# Patient Record
Sex: MALE | Race: WHITE | NOT HISPANIC OR LATINO | Employment: FULL TIME | URBAN - METROPOLITAN AREA
[De-identification: names, ages, dates, MRNs, and addresses within clinical notes are randomized per-mention and may not be internally consistent; named-entity substitution may affect disease eponyms.]

---

## 2017-01-16 ENCOUNTER — TELEPHONE (OUTPATIENT)
Dept: FAMILY MEDICINE | Facility: OTHER | Age: 52
End: 2017-01-16

## 2017-01-16 NOTE — TELEPHONE ENCOUNTER
Informed pharmacy. They will change the Rx. To proair and notify patient when it is ready for .    Jovita Caceres MA

## 2017-01-16 NOTE — TELEPHONE ENCOUNTER
Reason for call:  Medication  Reason for Call:  Medication or medication refill:    Do you use a [facility]?  Name of the pharmacy and phone number for the current request:  [facility] - [phone]    Name of the medication requested: ventolin    Other request: insurance saying they wont cover this and that he needs to change to pro air. Is this ok?     Can we leave a detailed message on this number? YES    Phone number patient can be reached at: [phone]    Best Time: any    Call taken on 1/16/2017 at 9:43 AM by Gisele Brown

## 2017-01-16 NOTE — TELEPHONE ENCOUNTER
Yes, Ok  Proair is another brand of the albuterol . Please call pharmacy , ok to dispense brand covered by [employer]

## 2017-01-30 ENCOUNTER — TELEPHONE (OUTPATIENT)
Dept: FAMILY MEDICINE | Facility: OTHER | Age: 52
End: 2017-01-30

## 2017-01-30 DIAGNOSIS — J01.90 ACUTE SINUSITIS WITH SYMPTOMS > 10 DAYS: Primary | ICD-10-CM

## 2017-01-30 RX ORDER — AMOXICILLIN AND CLAVULANATE POTASSIUM 500; 125 MG/1; MG/1
1 TABLET, FILM COATED ORAL 3 TIMES DAILY
Qty: 21 TABLET | Refills: 0 | Status: ON HOLD | OUTPATIENT
Start: 2017-01-30 | End: 2017-02-07

## 2017-01-30 NOTE — TELEPHONE ENCOUNTER
RN Sinusitis Treatment Protocol: ages 18 and up  Parker Hunt, a 51 year old male, is having symptoms reviewed for possible sinus infection.  Amoxicillin-clavulanate 875mg/125mg orally twice daily or 500mg/125mg three times daily for 5 to 7 days.ASSESSMENT/PLAN:  1.  Antibiotic treatment is indicated as onset of symptoms have been more than 10 days.  Amoxicillin-clavulanate 875mg/125mg orally twice daily or 500mg/125mg three times daily for 5 to 7 days.  2.  Education: ibuprofen or acetaminophen as directed on the bottle, over the counter decongestant, Nasal saline rinse, (Neti-pot or a nasal saline spray is preferred to Afrin nasal spray), Afrin nasal spray no longer than 3 days.  3.  Follow-up: Contact provider's triage RN if symptoms are not improving after 10 days of home treatment or if symptoms are worsening.   4.  Patient verbalized understanding of this plan and is agreeable.    SUBJECTIVE:  Symptoms: Facial pain or pressure over the sinus areas, especially if worse with position change or cough, Nasal discharge/purulent, Nasal congestion, Change in sense of smell or lack of smell and Post nasal drip     In addition notes: None   Shortness of breath: NO  Onset of symptoms was 3 week(s) ago.    Treatment measures tried include Fluids, OTC meds and Rest.   Course of illness is worsening.  Predisposing conditions include: History of asthma    Complicating Factors and Serious Symptoms:   Patient reports: NONE   Patient denies: Fever > 102.5  Orbital pain  Periorbital swelling or erythema  Severe facial swelling or erythema  Severe neck pain  This being the worse headache the patient has ever experienced  Vision changes  COPD (chronic obstructive pulmonary disease)    ALLERGIES:   Allergies   Allergen Reactions     No Known Drug Allergies        OBJECTIVE:    Weight: 0 lbs 0 oz    NURSING PLAN: Nursing advice to patient wash hands well, cover cough, follow up in clinic if not improving    RECOMMENDED  DISPOSITION:  Home care advice -   Will comply with recommendation:  Yes    Encounter handled by: Nurse Triage.     Jimbo Erazo RN

## 2017-02-01 ENCOUNTER — APPOINTMENT (OUTPATIENT)
Dept: GENERAL RADIOLOGY | Facility: CLINIC | Age: 52
DRG: 176 | End: 2017-02-01
Attending: FAMILY MEDICINE
Payer: COMMERCIAL

## 2017-02-01 ENCOUNTER — TELEPHONE (OUTPATIENT)
Dept: FAMILY MEDICINE | Facility: OTHER | Age: 52
End: 2017-02-01

## 2017-02-01 ENCOUNTER — APPOINTMENT (OUTPATIENT)
Dept: ULTRASOUND IMAGING | Facility: CLINIC | Age: 52
DRG: 176 | End: 2017-02-01
Attending: FAMILY MEDICINE
Payer: COMMERCIAL

## 2017-02-01 ENCOUNTER — HOSPITAL ENCOUNTER (INPATIENT)
Facility: CLINIC | Age: 52
LOS: 5 days | Discharge: HOME HEALTH | DRG: 176 | End: 2017-02-07
Attending: FAMILY MEDICINE | Admitting: PEDIATRICS
Payer: COMMERCIAL

## 2017-02-01 ENCOUNTER — APPOINTMENT (OUTPATIENT)
Dept: CT IMAGING | Facility: CLINIC | Age: 52
DRG: 176 | End: 2017-02-01
Attending: FAMILY MEDICINE
Payer: COMMERCIAL

## 2017-02-01 DIAGNOSIS — I10 ESSENTIAL HYPERTENSION: ICD-10-CM

## 2017-02-01 DIAGNOSIS — I26.99 OTHER ACUTE PULMONARY EMBOLISM WITHOUT ACUTE COR PULMONALE (H): ICD-10-CM

## 2017-02-01 DIAGNOSIS — R09.02 HYPOXIA: ICD-10-CM

## 2017-02-01 DIAGNOSIS — J01.90 ACUTE NON-RECURRENT SINUSITIS, UNSPECIFIED LOCATION: ICD-10-CM

## 2017-02-01 DIAGNOSIS — J45.41 MODERATE PERSISTENT ASTHMA WITH ACUTE EXACERBATION: Primary | ICD-10-CM

## 2017-02-01 LAB
ANION GAP SERPL CALCULATED.3IONS-SCNC: 8 MMOL/L (ref 3–14)
BASE EXCESS BLDV CALC-SCNC: 1.1 MMOL/L
BASOPHILS # BLD AUTO: 0 10E9/L (ref 0–0.2)
BASOPHILS NFR BLD AUTO: 0.5 %
BUN SERPL-MCNC: 14 MG/DL (ref 7–30)
CALCIUM SERPL-MCNC: 8.9 MG/DL (ref 8.5–10.1)
CHLORIDE SERPL-SCNC: 107 MMOL/L (ref 94–109)
CO2 SERPL-SCNC: 25 MMOL/L (ref 20–32)
CREAT SERPL-MCNC: 1.06 MG/DL (ref 0.66–1.25)
D DIMER PPP FEU-MCNC: 0.3 UG/ML FEU (ref 0–0.5)
DIFFERENTIAL METHOD BLD: NORMAL
EOSINOPHIL # BLD AUTO: 0.6 10E9/L (ref 0–0.7)
EOSINOPHIL NFR BLD AUTO: 10.7 %
ERYTHROCYTE [DISTWIDTH] IN BLOOD BY AUTOMATED COUNT: 13.1 % (ref 10–15)
FLUAV+FLUBV AG SPEC QL: NEGATIVE
FLUAV+FLUBV AG SPEC QL: NORMAL
GFR SERPL CREATININE-BSD FRML MDRD: 73 ML/MIN/1.7M2
GLUCOSE SERPL-MCNC: 99 MG/DL (ref 70–99)
HCO3 BLDV-SCNC: 25 MMOL/L (ref 21–28)
HCT VFR BLD AUTO: 46.1 % (ref 40–53)
HGB BLD-MCNC: 15.8 G/DL (ref 13.3–17.7)
IMM GRANULOCYTES # BLD: 0.1 10E9/L (ref 0–0.4)
IMM GRANULOCYTES NFR BLD: 0.9 %
INR PPP: 0.99 (ref 0.86–1.14)
LYMPHOCYTES # BLD AUTO: 1.2 10E9/L (ref 0.8–5.3)
LYMPHOCYTES NFR BLD AUTO: 20.5 %
MCH RBC QN AUTO: 30.5 PG (ref 26.5–33)
MCHC RBC AUTO-ENTMCNC: 34.3 G/DL (ref 31.5–36.5)
MCV RBC AUTO: 89 FL (ref 78–100)
MONOCYTES # BLD AUTO: 0.3 10E9/L (ref 0–1.3)
MONOCYTES NFR BLD AUTO: 5.7 %
NEUTROPHILS # BLD AUTO: 3.6 10E9/L (ref 1.6–8.3)
NEUTROPHILS NFR BLD AUTO: 61.7 %
NT-PROBNP SERPL-MCNC: 41 PG/ML (ref 0–900)
O2/TOTAL GAS SETTING VFR VENT: 21 %
PCO2 BLDV: 38 MM HG (ref 40–50)
PH BLDV: 7.43 PH (ref 7.32–7.43)
PLATELET # BLD AUTO: 235 10E9/L (ref 150–450)
PO2 BLDV: 50 MM HG (ref 25–47)
POTASSIUM SERPL-SCNC: 4 MMOL/L (ref 3.4–5.3)
RBC # BLD AUTO: 5.18 10E12/L (ref 4.4–5.9)
SODIUM SERPL-SCNC: 140 MMOL/L (ref 133–144)
SPECIMEN SOURCE: NORMAL
TROPONIN I SERPL-MCNC: NORMAL UG/L (ref 0–0.04)
WBC # BLD AUTO: 5.8 10E9/L (ref 4–11)

## 2017-02-01 PROCEDURE — 96360 HYDRATION IV INFUSION INIT: CPT

## 2017-02-01 PROCEDURE — 71260 CT THORAX DX C+: CPT

## 2017-02-01 PROCEDURE — 84484 ASSAY OF TROPONIN QUANT: CPT | Performed by: FAMILY MEDICINE

## 2017-02-01 PROCEDURE — 25000125 ZZHC RX 250: Performed by: FAMILY MEDICINE

## 2017-02-01 PROCEDURE — 93970 EXTREMITY STUDY: CPT

## 2017-02-01 PROCEDURE — 80048 BASIC METABOLIC PNL TOTAL CA: CPT | Performed by: FAMILY MEDICINE

## 2017-02-01 PROCEDURE — 96361 HYDRATE IV INFUSION ADD-ON: CPT

## 2017-02-01 PROCEDURE — 25000132 ZZH RX MED GY IP 250 OP 250 PS 637: Performed by: FAMILY MEDICINE

## 2017-02-01 PROCEDURE — 82803 BLOOD GASES ANY COMBINATION: CPT | Performed by: FAMILY MEDICINE

## 2017-02-01 PROCEDURE — 71010 XR CHEST PORT 1 VW: CPT | Mod: TC

## 2017-02-01 PROCEDURE — 87804 INFLUENZA ASSAY W/OPTIC: CPT | Performed by: FAMILY MEDICINE

## 2017-02-01 PROCEDURE — 99285 EMERGENCY DEPT VISIT HI MDM: CPT | Mod: 25 | Performed by: FAMILY MEDICINE

## 2017-02-01 PROCEDURE — 83880 ASSAY OF NATRIURETIC PEPTIDE: CPT | Performed by: FAMILY MEDICINE

## 2017-02-01 PROCEDURE — G0378 HOSPITAL OBSERVATION PER HR: HCPCS

## 2017-02-01 PROCEDURE — 85379 FIBRIN DEGRADATION QUANT: CPT | Performed by: FAMILY MEDICINE

## 2017-02-01 PROCEDURE — 96372 THER/PROPH/DIAG INJ SC/IM: CPT

## 2017-02-01 PROCEDURE — 25500064 ZZH RX 255 OP 636: Performed by: FAMILY MEDICINE

## 2017-02-01 PROCEDURE — 99285 EMERGENCY DEPT VISIT HI MDM: CPT | Mod: 25

## 2017-02-01 PROCEDURE — 85610 PROTHROMBIN TIME: CPT | Performed by: FAMILY MEDICINE

## 2017-02-01 PROCEDURE — 25000128 H RX IP 250 OP 636: Performed by: FAMILY MEDICINE

## 2017-02-01 PROCEDURE — 85025 COMPLETE CBC W/AUTO DIFF WBC: CPT | Performed by: FAMILY MEDICINE

## 2017-02-01 PROCEDURE — 93010 ELECTROCARDIOGRAM REPORT: CPT | Performed by: FAMILY MEDICINE

## 2017-02-01 PROCEDURE — 25000132 ZZH RX MED GY IP 250 OP 250 PS 637: Performed by: PEDIATRICS

## 2017-02-01 PROCEDURE — 99220 ZZC INITIAL OBSERVATION CARE,LEVL III: CPT | Performed by: PHYSICIAN ASSISTANT

## 2017-02-01 RX ORDER — LORATADINE 10 MG/1
10 TABLET ORAL DAILY
COMMUNITY
End: 2017-03-27

## 2017-02-01 RX ORDER — GUAIFENESIN 600 MG/1
1200 TABLET, EXTENDED RELEASE ORAL 2 TIMES DAILY
COMMUNITY
End: 2017-03-27

## 2017-02-01 RX ORDER — ALBUTEROL SULFATE 0.83 MG/ML
2.5 SOLUTION RESPIRATORY (INHALATION)
Status: DISCONTINUED | OUTPATIENT
Start: 2017-02-01 | End: 2017-02-01

## 2017-02-01 RX ORDER — ALBUTEROL SULFATE 0.83 MG/ML
2.5 SOLUTION RESPIRATORY (INHALATION) EVERY 4 HOURS PRN
Status: DISCONTINUED | OUTPATIENT
Start: 2017-02-01 | End: 2017-02-02

## 2017-02-01 RX ORDER — IOPAMIDOL 755 MG/ML
100 INJECTION, SOLUTION INTRAVASCULAR ONCE
Status: COMPLETED | OUTPATIENT
Start: 2017-02-01 | End: 2017-02-01

## 2017-02-01 RX ORDER — LIDOCAINE 40 MG/G
CREAM TOPICAL
Status: DISCONTINUED | OUTPATIENT
Start: 2017-02-01 | End: 2017-02-07 | Stop reason: HOSPADM

## 2017-02-01 RX ORDER — LISINOPRIL 2.5 MG/1
5 TABLET ORAL DAILY
Status: DISCONTINUED | OUTPATIENT
Start: 2017-02-01 | End: 2017-02-04

## 2017-02-01 RX ORDER — NALOXONE HYDROCHLORIDE 0.4 MG/ML
.1-.4 INJECTION, SOLUTION INTRAMUSCULAR; INTRAVENOUS; SUBCUTANEOUS
Status: DISCONTINUED | OUTPATIENT
Start: 2017-02-01 | End: 2017-02-01

## 2017-02-01 RX ORDER — AMOXICILLIN AND CLAVULANATE POTASSIUM 500; 125 MG/1; MG/1
1 TABLET, FILM COATED ORAL EVERY 8 HOURS SCHEDULED
Status: DISCONTINUED | OUTPATIENT
Start: 2017-02-01 | End: 2017-02-07 | Stop reason: HOSPADM

## 2017-02-01 RX ORDER — ASPIRIN 81 MG/1
324 TABLET, CHEWABLE ORAL ONCE
Status: COMPLETED | OUTPATIENT
Start: 2017-02-01 | End: 2017-02-01

## 2017-02-01 RX ORDER — IPRATROPIUM BROMIDE AND ALBUTEROL SULFATE 2.5; .5 MG/3ML; MG/3ML
3 SOLUTION RESPIRATORY (INHALATION) ONCE
Status: COMPLETED | OUTPATIENT
Start: 2017-02-01 | End: 2017-02-01

## 2017-02-01 RX ORDER — LIDOCAINE 40 MG/G
CREAM TOPICAL
Status: DISCONTINUED | OUTPATIENT
Start: 2017-02-01 | End: 2017-02-01

## 2017-02-01 RX ORDER — DEXAMETHASONE SODIUM PHOSPHATE 10 MG/ML
10 INJECTION, SOLUTION INTRAMUSCULAR; INTRAVENOUS ONCE
Status: DISCONTINUED | OUTPATIENT
Start: 2017-02-01 | End: 2017-02-01

## 2017-02-01 RX ADMIN — IOPAMIDOL 77 ML: 755 INJECTION, SOLUTION INTRAVENOUS at 11:40

## 2017-02-01 RX ADMIN — LISINOPRIL 5 MG: 2.5 TABLET ORAL at 21:03

## 2017-02-01 RX ADMIN — WARFARIN SODIUM 7.5 MG: 5 TABLET ORAL at 18:55

## 2017-02-01 RX ADMIN — IPRATROPIUM BROMIDE AND ALBUTEROL SULFATE 3 ML: .5; 3 SOLUTION RESPIRATORY (INHALATION) at 10:20

## 2017-02-01 RX ADMIN — SODIUM CHLORIDE 1000 ML: 9 INJECTION, SOLUTION INTRAVENOUS at 10:36

## 2017-02-01 RX ADMIN — ENOXAPARIN SODIUM 120 MG: 120 INJECTION SUBCUTANEOUS at 13:06

## 2017-02-01 RX ADMIN — ASPIRIN 81 MG CHEWABLE TABLET 324 MG: 81 TABLET CHEWABLE at 09:19

## 2017-02-01 RX ADMIN — AMOXICILLIN AND CLAVULANATE POTASSIUM 1 TABLET: 500; 125 TABLET, FILM COATED ORAL at 21:04

## 2017-02-01 RX ADMIN — SODIUM CHLORIDE 71 ML: 9 INJECTION, SOLUTION INTRAVENOUS at 11:40

## 2017-02-01 ASSESSMENT — ENCOUNTER SYMPTOMS
FEVER: 0
SHORTNESS OF BREATH: 1
CHILLS: 0
ACTIVITY CHANGE: 1
DIFFICULTY URINATING: 0
HEADACHES: 0
DIAPHORESIS: 0
CHOKING: 0
ARTHRALGIAS: 0
WHEEZING: 0
SORE THROAT: 0
APNEA: 0
DIZZINESS: 0
DIARRHEA: 0
ABDOMINAL DISTENTION: 0
NAUSEA: 0
VOMITING: 0
ABDOMINAL PAIN: 0
PALPITATIONS: 0
COUGH: 0
MYALGIAS: 0
CHEST TIGHTNESS: 0
TROUBLE SWALLOWING: 0

## 2017-02-01 NOTE — PROGRESS NOTES
S-(situation): Patient registered to Observation. Patient arrived to room 269 via cart from ED.    B-(background): PE    A-(assessment): SOB with activity, sats 95% on 5L.    R-(recommendations): Orders and observation goals reviewed with patient and wife.    Nursing Observation criteria listed below was met:    Skin issues/needs documented:NA  Isolation needs addressed, if appropriate: NA  Fall Prevention: Education given and documented and signage used: Yes  Education Assessment documented:Yes  Education Documented (Pre-existing chronic infection such as, MRSA/VRE need education on admission): Yes  New medication patient education completed and documented (Possible Side Effects of Common Medications handout): Yes  Home medications if not able to send immediately home with family stored here: Yes  Reminder note placed in discharge instructions: Yes  Patient has discharge needs (If yes, please explain): No

## 2017-02-01 NOTE — TELEPHONE ENCOUNTER
Parker Hunt is a 51 year old male who calls with report of difficulty breathing.  Patient is currently on ABX for sinus infection.  Started ABX 2 days ago.  Feels medication is helping symptoms but over the last few days has been getting progressively worse.  Not talking in completed sentences.  Hx of asthma.  Was using Pro Air x 10 per day.  He has not taken that in a few days and has only been taking his flovent.  No chest pain.  It is worsening over the past couple of days.  Patient has been taking mucinex.  It doesn't seem to be helping.     NURSING ASSESSMENT:  Description:  Difficulty breathing  Onset/duration:  2 days  Precip. factors:  Hx of asthma  Associated symptoms:  See above  Last exam/Treatment:  11/28/2016  Allergies:   Allergies   Allergen Reactions     No Known Drug Allergies        NURSING PLAN: Recommend patient be seen in ED for report of difficulty breathing, despite using inhalers and unable to speak in complete sentences.  Patient agrees to be seen in ED.    RECOMMENDED DISPOSITION:  To ED  Will comply with recommendation: Yes  If further questions/concerns or if symptoms do not improve, worsen or new symptoms develop, call your PCP or [facility] as soon as possible.      Guideline used: breathing problems  Telephone Triage Protocols for Nurses, Fourth Edition, Stacie Segal RN

## 2017-02-01 NOTE — ED NOTES
Pt continues to be short of breath while moving or eating. Pt applying 02 oximizer intermittently. Pt has had nasal drainage and stuffiness.

## 2017-02-01 NOTE — IP AVS SNAPSHOT
MRN:[id number]                      After Visit Summary   2/1/2017    Parker Hunt    MRN: [id number]           Thank you!     Thank you for choosing [facility] for your care. Our goal is always to provide you with excellent care. Hearing back from our patients is one way we can continue to improve our services. Please take a few minutes to complete the written survey that you may receive in the mail after you visit with us. Thank you!        Patient Information     Date Of Birth          1965        About your hospital stay     You were admitted on:  February 1, 2017 You last received care in the:  [address]    You were discharged on:  February 7, 2017        Reason for your hospital stay       1.  Pulmonary embolism (clot in your lungs)  2.  Asthma attack  3.  Sinus infection    You have improved and stabilized while you are in the hospital and are able to go home with ongoing oxygen, coumadin, steroids, and nebs.  Please continue to use 1-2 L oxygen when at rest, 2L with activity and 3L while sleeping.  Enjoy going home!!                  Who to Call     For medical emergencies, please call 911.  For non-urgent questions about your medical care, please call your primary care provider or clinic, [phone]          Attending Provider     Provider    Rio Caceres MD Katter, James T, MD Peterson, Elizabeth Joanne, MD       Primary Care Provider Office Phone # Fax #    Monica Odell [phone]       [facility] [address] [facility] [address]    [facility] [address]        After Care Instructions     Activity       Your activity upon discharge: activity as tolerated            Diet       Follow this diet upon discharge: Regular Diet Adult                  Follow-up Appointments     Follow-up and recommended labs and tests        Follow up with primary care provider, Monica Odell, within 7 days for hospital follow- up.   You should have a pulmonary function test performed in the future once you are at back to your baseline breathing to assess your asthma further and see if there has been any longer term damage done by this event.                  Your next 10 appointments already scheduled     Feb 09, 2017  9:00 AM   LAB with [facility]   [facility]    [address] [address]   [phone]           Patient must bring picture ID.  Patient should be prepared to give a urine specimen  Please do not eat 10-12 hours before your appointment if you are coming in fasting for labs on lipids, cholesterol, or glucose (sugar).  Pregnant women should follow their Care Team instructions. Water with medications is okay. Do not drink coffee or other fluids.   If you have concerns about taking  your medications, please ask at office or if scheduling via F[employer], send a message by clicking on Secure Messaging, Message Your Care Team.            Feb 13, 2017  1:40 PM   Office Visit with Monica Odell MD   [facility]    [address] [address]   [phone]           Bring a current list of meds and any records pertaining to this visit.  For Physicals, please bring immunization records and any forms needing to be filled out.  Please arrive 10 minutes early to complete paperwork.              Additional Services     HOME CARE NURSING REFERRAL       **Order classes of: [facility],  Homecare and [facility] will route to the Home Care and Hospice Referral Pool.  Home Care or Hospice will then contact the patient to schedule their appointment.**    If you do not hear from Home Care and Hospice, or you would like to call to schedule, please call the referring place of service that your provider has listed below.  ______________________________________________________________________    Your provider has referred you to:  [facility]: [facility] [phone]   [email or web address]    Extended Emergency Contact Information  Primary Emergency Contact: Manuela Hunt  Address: [address] [place] [address] [facility]  Home Phone: [phone]  Relation: Spouse  Secondary Emergency Contact: no one   [place]  Relation: None    Patient Anticipated Discharge Date: 2/7/17   RN, PT, HHA to begin 24 - 48 hours after discharge.  PLEASE EVALUATE AND TREAT (Evaluation timeline is 24 - 48 hrs. Please call if there is need for a variance to this timeline).    REASON FOR REFERRAL: Assessment & Treatment: RN    ADDITIONAL SERVICES NEEDED: non identified    OTHER PERTINENT INFORMATION: Patient was last seen by provider on 2/7/17 for pulmonary embolism, asthma exacerbation.    No current outpatient prescriptions on file.    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     Atypical chest pain     Positive D dimer     Thyroid nodules     Hyperlipidemia LDL goal <160     Hypertension     History of thyroid cancer     Moderate intermittent asthma     Acute pulmonary embolism (H)     Hypoxia     Moderate persistent asthma with acute exacerbation     Acute respiratory distress (H)     Acute sinusitis     Obesity      Documentation of Face to Face and Certification for Home Health Services    I certify that patient, Parker Hunt is under my care and that I, or a Nurse Practitioner or Physician's Assistant working with me, had a face-to-face encounter that meets the physician face-to-face encounter requirements with this patient on: 2/7/2017.    This encounter with the patient was in whole, or in part, for the following medical condition, which is the primary reason for Home Health Care: respiratory failure secondary to pulmonary embolism and asthma exacerbation.    I  certify that, based on my findings, the following services are medically necessary Home Health Services: Nursing    My clinical findings support the need for the above services because: Nurse is needed: To assess medical stability and medication understanding after changes in medications or other medical regimen..    Further, I certify that my clinical findings support that this patient is homebound (i.e. absences from home require considerable and taxing effort and are for medical reasons or [employer] services or infrequently or of short duration when for other reasons) because: Requires assistance of another person or specialized equipment to access medical services because patient: Is unable to walk greater than 50 feet without rest..    Based on the above findings, I certify that this patient is confined to the home and needs intermittent skilled nursing care, physical therapy and/or speech therapy.  The patient is under my care, and I have initiated the establishment of the plan of care.  This patient will be followed by a physician who will periodically review the plan of care.    Physician/Provider to provide follow up care: Monica Odell certified Physician at time of discharge: Vivian Cohn MD    Please be aware that coverage of these services is subject to the terms and limitations of your health insurance plan.  Call member services at your health plan with any benefit or coverage questions.            INR CLINIC REFERRAL       Your provider has referred you to INR Services.    Please be aware that coverage of these services is subject to the terms and limitations of your health insurance plan.  Call member services at your health plan with any benefit or coverage questions.    Indication for Anticoagulation: Pulmonary Embolism  If nonstandard INR is desired, indicate goal range and explanation: N/A  Expected Duration of Therapy: 6 Months                  Warfarin  "Instruction     You have started taking a medicine called warfarin. This is a blood-thinning medicine (anticoagulant). It helps prevent and treat blood clots.      Before leaving the hospital, make sure you know how much to take and how long to take it.      You will need regular blood tests to make sure your blood is clotting safely. It is very important to see your doctor for regular blood tests.    Talk to your doctor before taking any new medicine (this includes over-the-counter drugs and herbal products). Many medicines can interact with warfarin. This may cause more bleeding or too much clotting.     Eating a lot of vitamin K--found in green, leafy vegetables--can change the way warfarin works in your body. Do NOT avoid these foods. Instead, try to eat the same amount each day.     Bleeding is the most common side-effect of warfarin. You may notice bleeding gums, a bloody nose, bruises and bleeding longer when you cut yourself. See a doctor at once if:   o You cough up blood  o You find blood in your stool (poop)  o You have a deep cut, or a cut that bleeds longer than 10 minutes   o You have a bad cut, hard fall, accident or hit your head (go to urgent care or the emergency room).    For women who can get pregnant: This medicine can harm an unborn baby. Be very careful not to get pregnant while taking this medicine. If you think you might be pregnant, call your doctor right away.    For more information, read \"Guide to Warfarin Therapy,  the booklet you received in the hospital.        Pending Results     No orders found from 1/31/2017 to 2/2/2017.            Statement of Approval     Ordered          02/07/17 1301  I have reviewed and agree with all the recommendations and orders detailed in this document.   EFFECTIVE NOW     Approved and electronically signed by:  Vivian Cohn MD             Admission Information        Provider Department Dept Phone    2/1/2017 Vivian Cohn MD " "[facility] [phone]      Your Vitals Were     Blood Pressure Pulse Temperature    145/83 mmHg 108 97.5  F (36.4  C) (Oral)    Respirations Height Weight    20 1.93 m (6' 4\") 133.5 kg (294 lb 5 oz)    BMI (Body Mass Index) Pulse Oximetry       35.84 kg/m2 92%       MyChart Information     [employer] [employer] lets you send messages to your doctor, view your test results, renew your prescriptions, schedule appointments and more. To sign up, go to [email or web address] . Click on \"Log in\" on the left side of the screen, which will take you to the Welcome page. Then click on \"Sign up Now\" on the right side of the page.     You will be asked to enter the access code listed below, as well as some personal information. Please follow the directions to create your username and password.     Your access code is: [email or web address]  Expires: 2017  9:01 PM     Your access code will  in 90 days. If you need help or a new code, please call your [facility] clinic or [phone].        Care EveryWhere ID     This is your Care EveryWhere ID. This could be used by other organizations to access your [facility] medical records  [id number]           Review of your medicines      START taking        Dose / Directions    ipratropium - albuterol 0.5 mg/2.5 mg/3 mL 0.5-2.5 (3) MG/3ML neb solution   Commonly known as:  DUONEB   Used for:  Other acute pulmonary embolism without acute cor pulmonale (H), Moderate persistent asthma with acute exacerbation        Dose:  3 mL   Take 1 vial (3 mLs) by nebulization every 4 hours as needed for wheezing   Quantity:  360 mL   Refills:  0       lisinopril 10 MG tablet   Commonly known as:  PRINIVIL/ZESTRIL   Used for:  Essential hypertension        Dose:  10 mg   Take 1 tablet (10 mg) by mouth daily   Quantity:  30 tablet   Refills:  0       * order for DME   Used for:  Moderate persistent asthma with acute exacerbation, Other acute pulmonary embolism without acute cor pulmonale (H)        " Equipment being ordered: Nebulizer with attachment kit   Quantity:  1 Device   Refills:  0       * order for DME   Used for:  Moderate persistent asthma with acute exacerbation, Other acute pulmonary embolism without acute cor pulmonale (H)        Equipment being ordered: Oxygen Patient requires 2-3 L oxygen continuously.  Requires portable gas.   Quantity:  1 Device   Refills:  0       predniSONE 20 MG tablet   Commonly known as:  DELTASONE   Used for:  Moderate persistent asthma with acute exacerbation        Dose:  40 mg   Take 2 tablets (40 mg) by mouth 2 times daily x3 days, then 2 tabs in AM 1 tab in PM x3 days, then 2 tabs in AM x3 days, then 1 tab in AM x3 days, then stop.   Quantity:  30 tablet   Refills:  0       sodium chloride 0.65 % nasal spray   Commonly known as:  OCEAN        Dose:  1 spray   Spray 1 spray into both nostrils every hour as needed for congestion   Refills:  0       warfarin 4 MG tablet   Commonly known as:  COUMADIN   Used for:  Other acute pulmonary embolism without acute cor pulmonale (H)        Dose:  4 mg   Take 1 tablet (4 mg) by mouth daily At 6 PM - may change as directed by Coumadin Clinic   Quantity:  30 tablet   Refills:  0       * Notice:  This list has 2 medication(s) that are the same as other medications prescribed for you. Read the directions carefully, and ask your doctor or other care provider to review them with you.      CONTINUE these medicines which may have CHANGED, or have new prescriptions. If we are uncertain of the size of tablets/capsules you have at home, strength may be listed as something that might have changed.        Dose / Directions    * albuterol 108 (90 BASE) MCG/ACT Inhaler   Commonly known as:  VENTOLIN HFA   This may have changed:  Another medication with the same name was added. Make sure you understand how and when to take each.   Used for:  Wheezing        USE 2 PUFFS EVERY SIX HOURS AS NEEDED FOR SHORTNESS OF BREATH AND DIFFICULTY BREATHING    Quantity:  18 g   Refills:  3       * albuterol (2.5 MG/3ML) 0.083% neb solution   This may have changed:  You were already taking a medication with the same name, and this prescription was added. Make sure you understand how and when to take each.   Used for:  Moderate persistent asthma with acute exacerbation, Other acute pulmonary embolism without acute cor pulmonale (H)        Dose:  3 mL   Take 1 vial (2.5 mg) by nebulization every 4 hours (while awake)   Quantity:  360 mL   Refills:  0       amoxicillin-clavulanate 500-125 MG per tablet   Commonly known as:  AUGMENTIN   Indication:  sinusitis   This may have changed:    - when to take this  - additional instructions   Used for:  Acute non-recurrent sinusitis, unspecified location        Dose:  1 tablet   Take 1 tablet by mouth every 8 hours for 11 doses - first dose the evening of discharge   Quantity:  11 tablet   Refills:  0       * Notice:  This list has 2 medication(s) that are the same as other medications prescribed for you. Read the directions carefully, and ask your doctor or other care provider to review them with you.      CONTINUE these medicines which have NOT CHANGED        Dose / Directions    diphenhydrAMINE-acetaminophen  MG tablet   Commonly known as:  TYLENOL PM        Dose:  1 tablet   Take 1 tablet by mouth nightly as needed for sleep   Refills:  0       fluticasone 110 MCG/ACT Inhaler   Commonly known as:  FLOVENT HFA   Used for:  Moderate intermittent asthma, uncomplicated        Dose:  2 puff   Inhale 2 puffs into the lungs 2 times daily Needs to be seen for follow up.   Quantity:  1 Inhaler   Refills:  5       guaiFENesin 600 MG 12 hr tablet   Commonly known as:  MUCINEX        Dose:  1200 mg   Take 1,200 mg by mouth 2 times daily   Refills:  0       loratadine 10 MG tablet   Commonly known as:  CLARITIN        Dose:  10 mg   Take 10 mg by mouth daily   Refills:  0       order for DME   Used for:  Plantar fasciitis         Equipment being ordered: super feet   Quantity:  1 each   Refills:  0            Where to get your medicines      These medications were sent to [facility] [place], [place] - [address]  [address], [facility] [address]     Phone:  [phone]    - albuterol (2.5 MG/3ML) 0.083% neb solution  - amoxicillin-clavulanate 500-125 MG per tablet  - ipratropium - albuterol 0.5 mg/2.5 mg/3 mL 0.5-2.5 (3) MG/3ML neb solution  - lisinopril 10 MG tablet  - predniSONE 20 MG tablet  - warfarin 4 MG tablet      Some of these will need a paper prescription and others can be bought over the counter. Ask your nurse if you have questions.     Bring a paper prescription for each of these medications    - order for DME  - order for DME             Protect others around you: Learn how to safely use, store and throw away your medicines at www.disposemymeds.org.             Medication List: This is a list of all your medications and when to take them. Check marks below indicate your daily home schedule. Keep this list as a reference.      Medications           Morning Afternoon Evening Bedtime As Needed    * albuterol 108 (90 BASE) MCG/ACT Inhaler   Commonly known as:  VENTOLIN HFA   USE 2 PUFFS EVERY SIX HOURS AS NEEDED FOR SHORTNESS OF BREATH AND DIFFICULTY BREATHING   Last time this was given:  2 puffs on 2/3/2017  1:35 PM                                   * albuterol (2.5 MG/3ML) 0.083% neb solution   Take 1 vial (2.5 mg) by nebulization every 4 hours (while awake)   Last time this was given:  2.5 mg on 2/5/2017  6:13 PM                                            amoxicillin-clavulanate 500-125 MG per tablet   Commonly known as:  AUGMENTIN   Take 1 tablet by mouth every 8 hours for 11 doses - first dose the evening of discharge   Last time this was given:  1 tablet on 2/7/2017  5:50 AM                                         diphenhydrAMINE-acetaminophen  MG tablet   Commonly known as:  TYLENOL PM   Take 1  tablet by mouth nightly as needed for sleep                                   fluticasone 110 MCG/ACT Inhaler   Commonly known as:  FLOVENT HFA   Inhale 2 puffs into the lungs 2 times daily Needs to be seen for follow up.                                   guaiFENesin 600 MG 12 hr tablet   Commonly known as:  MUCINEX   Take 1,200 mg by mouth 2 times daily                                      ipratropium - albuterol 0.5 mg/2.5 mg/3 mL 0.5-2.5 (3) MG/3ML neb solution   Commonly known as:  DUONEB   Take 1 vial (3 mLs) by nebulization every 4 hours as needed for wheezing   Last time this was given:  3 mLs on 2/6/2017  7:44 AM                                   lisinopril 10 MG tablet   Commonly known as:  PRINIVIL/ZESTRIL   Take 1 tablet (10 mg) by mouth daily   Last time this was given:  10 mg on 2/7/2017  9:05 AM                                   loratadine 10 MG tablet   Commonly known as:  CLARITIN   Take 10 mg by mouth daily                                   order for DME   Equipment being ordered: super feet                                * order for DME   Equipment being ordered: Nebulizer with attachment kit                                * order for DME   Equipment being ordered: Oxygen Patient requires 2-3 L oxygen continuously.  Requires portable gas.                                predniSONE 20 MG tablet   Commonly known as:  DELTASONE   Take 2 tablets (40 mg) by mouth 2 times daily x3 days, then 2 tabs in AM 1 tab in PM x3 days, then 2 tabs in AM x3 days, then 1 tab in AM x3 days, then stop.   Last time this was given:  40 mg on 2/7/2017  9:05 AM                                sodium chloride 0.65 % nasal spray   Commonly known as:  OCEAN   Spray 1 spray into both nostrils every hour as needed for congestion                                   warfarin 4 MG tablet   Commonly known as:  COUMADIN   Take 1 tablet (4 mg) by mouth daily At 6 PM - may change as directed by Coumadin Clinic   Last time this was given:   5 mg on 2/6/2017  5:55 PM                                   * Notice:  This list has 4 medication(s) that are the same as other medications prescribed for you. Read the directions carefully, and ask your doctor or other care provider to review them with you.              More Information        Lisinopril Oral tablet  What is this medicine?  LISINOPRIL (lyse IN oh pril) is an ACE inhibitor. This medicine is used to treat high blood pressure and heart failure. It is also used to protect the heart immediately after a heart attack.  This medicine may be used for other purposes; ask your health care provider or pharmacist if you have questions.  What should I tell my health care provider before I take this medicine?  They need to know if you have any of these conditions:    diabetes    heart or blood vessel disease    immune system disease like lupus or scleroderma    kidney disease    low blood pressure    previous swelling of the tongue, face, or lips with difficulty breathing, difficulty swallowing, hoarseness, or tightening of the throat    an unusual or allergic reaction to lisinopril, other ACE inhibitors, insect venom, foods, dyes, or preservatives    pregnant or trying to get pregnant    breast-feeding  How should I use this medicine?  Take this medicine by mouth with a glass of water. Follow the directions on your prescription label. You may take this medicine with or without food. Take your medicine at regular intervals. Do not stop taking this medicine except on the advice of your doctor or health care professional.  Talk to your pediatrician regarding the use of this medicine in children. Special care may be needed. While this drug may be prescribed for children as young as 6 years of age for selected conditions, precautions do apply.  Overdosage: If you think you have taken too much of this medicine contact a poison control center or emergency room at once.  NOTE: This medicine is only for you. Do not share  this medicine with others.  What if I miss a dose?  If you miss a dose, take it as soon as you can. If it is almost time for your next dose, take only that dose. Do not take double or extra doses.  What may interact with this medicine?    diuretics    lithium    NSAIDs, medicines for pain and inflammation, like ibuprofen or naproxen    over-the-counter herbal supplements like hawthorn    potassium salts or potassium supplements    salt substitutes  This list may not describe all possible interactions. Give your health care provider a list of all the medicines, herbs, non-prescription drugs, or dietary supplements you use. Also tell them if you smoke, drink alcohol, or use illegal drugs. Some items may interact with your medicine.  What should I watch for while using this medicine?  Visit your doctor or health care professional for regular check ups. Check your blood pressure as directed. Ask your doctor what your blood pressure should be, and when you should contact him or her. Call your doctor or health care professional if you notice an irregular or fast heart beat.  Women should inform their doctor if they wish to become pregnant or think they might be pregnant. There is a potential for serious side effects to an unborn child. Talk to your health care professional or pharmacist for more information.  Check with your doctor or health care professional if you get an attack of severe diarrhea, nausea and vomiting, or if you sweat a lot. The loss of too much body fluid can make it dangerous for you to take this medicine.  You may get drowsy or dizzy. Do not drive, use machinery, or do anything that needs mental alertness until you know how this drug affects you. Do not stand or sit up quickly, especially if you are an older patient. This reduces the risk of dizzy or fainting spells. Alcohol can make you more drowsy and dizzy. Avoid alcoholic drinks.  Avoid salt substitutes unless you are told otherwise by your doctor  or health care professional.  Do not treat yourself for coughs, colds, or pain while you are taking this medicine without asking your doctor or health care professional for advice. Some ingredients may increase your blood pressure.  What side effects may I notice from receiving this medicine?  Side effects that you should report to your doctor or health care professional as soon as possible:    abdominal pain with or without nausea or vomiting    allergic reactions like skin rash or hives, swelling of the hands, feet, face, lips, throat, or tongue    dark urine    difficulty breathing    dizzy, lightheaded or fainting spell    fever or sore throat    irregular heart beat, chest pain    pain or difficulty passing urine    redness, blistering, peeling or loosening of the skin, including inside the mouth    unusually weak    yellowing of the eyes or skin  Side effects that usually do not require medical attention (report to your doctor or health care professional if they continue or are bothersome):    change in taste    cough    decreased sexual function or desire    headache    sun sensitivity    tiredness  This list may not describe all possible side effects. Call your doctor for medical advice about side effects. You may report side effects to FDA at 9-284-FDA-2538.  Where should I keep my medicine?  Keep out of the reach of children.  Store at room temperature between 15 and 30 degrees C (59 and 86 degrees F). Protect from moisture. Keep container tightly closed. Throw away any unused medicine after the expiration date.  NOTE:This sheet is a summary. It may not cover all possible information. If you have questions about this medicine, talk to your doctor, pharmacist, or health care provider. Copyright  2016 Gold Standard                Sepsis  Sepsis can happen when your body responds to a severe infection or bacteremia - the presence of bacteria in your bloodstream. Sepsis can be deadly. Blood pressure may drop  and the lungs and kidneys may start to fail. Emergency care for sepsis is crucial.    Risk factors  Those most at risk for sepsis are:    Infants or older adults    People who have an illness that weakens their immune system, such as cancer, AIDS, or diabetes    People being treated with chemotherapy medications or radiation, which weakens the immune system    People who have had a transplant    People with an infection such as pneumonia, meningitis, or a urinary tract infection  When to go to the emergency department (ED)  Sepsis is a medical emergency. Go to the nearest emergency department if a fever is present with any of these symptoms:    Chills and shaking    Rapid heartbeat and breathing; shortness of breath    Severe nausea or uncontrolled vomiting    Confusion, dizziness    Decreased urination    Severe pain, including in the back or joints   What to expect in the ED    Blood and urine tests are done to look for the presence of bacteria and to check for the presence of organ failure.    A blood culture may be done. In this test, a blood sample is sent to a lab, where it s placed in a special container. Any bacteria in the blood should grow in 24 hours.    X-rays may be taken or other imaging tests may be done.  A person with sepsis will be admitted to the hospital and treated with antibiotics. Treatment may also include oxygen and intravenous fluids.    4606-6418 [employer]. [address] [place] [address], [place] [address]. All rights reserved. This information is not intended as a substitute for professional medical care. Always follow your healthcare professional's instructions.

## 2017-02-01 NOTE — ED NOTES
"Pt presents with severe shortness of breath. Pt denies chest pain. Pt diaphoretic. Pt states has had \"head cold\" and prescribed amoxicillin for possible sinus infection. Pt very short of breath with activity.   "

## 2017-02-01 NOTE — IP AVS SNAPSHOT
[address] [facility]    [address] [place] [address]    Phone:  [phone]                                       After Visit Summary   2/1/2017    Parker Hunt    MRN: [id number]           After Visit Summary Signature Page     I have received my discharge instructions, and my questions have been answered. I have discussed any challenges I see with this plan with the nurse or doctor.    ..........................................................................................................................................  Patient/Patient Representative Signature      ..........................................................................................................................................  Patient Representative Print Name and Relationship to Patient    ..................................................               ................................................  Date                                            Time    ..........................................................................................................................................  Reviewed by Signature/Title    ...................................................              ..............................................  Date                                                            Time

## 2017-02-01 NOTE — ED PROVIDER NOTES
"  History     Chief Complaint   Patient presents with     Shortness of Breath     The history is provided by the patient and the spouse.     Parker Hunt is a 51 year old male who is here with shortness of breath and decreased stamina since Friday, 5 days ago.  He denies pain. He is unable to complete a full sentence with one breath.  He has had a head cold and spoke with an RN from the clinic on Monday. He was given amoxicillin over the phone on Monday. He is not getting any better. He has no history of this. He does have asthma but says that this feels different. He has no history of blood clots. 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 He did not drive this past Friday, however. He is a never smoker. He has no known CAD, and had a normal stress test done about 6 or 7 years ago. Apparently the physician told him, \"I wish I had your heart.\"    Nurse Note:  Pt presents with severe shortness of breath. Pt denies chest pain. Pt diaphoretic. Pt states has had \"head cold\" and prescribed amoxicillin for possible sinus infection. Pt very short of breath with activity.    I have reviewed the Medications, Allergies, Past Medical and Surgical History, and Social History in the Epic system.    Review of Systems   Constitutional: Positive for activity change. Negative for fever, chills and diaphoresis.   HENT: Negative for sore throat and trouble swallowing.    Respiratory: Positive for shortness of breath. Negative for apnea, cough, choking, chest tightness and wheezing.    Cardiovascular: Negative for chest pain, palpitations and leg swelling.   Gastrointestinal: Negative for nausea, vomiting, abdominal pain, diarrhea and abdominal distention.   Genitourinary: Negative for decreased urine volume and difficulty urinating.   Musculoskeletal: Negative for myalgias and arthralgias.   Skin: Negative for pallor and rash.   Neurological: Negative for dizziness, syncope and " headaches.   All other systems reviewed and are negative.      Physical Exam      Physical Exam   Constitutional: He is oriented to person, place, and time. He appears well-developed and well-nourished. He appears distressed.   HENT:   Head: Normocephalic and atraumatic.   Right Ear: External ear normal.   Left Ear: External ear normal.   Nose: Nose normal.   Mouth/Throat: Oropharynx is clear and moist.   Eyes: Conjunctivae and EOM are normal.   Neck: Normal range of motion.   Cardiovascular: Regular rhythm, normal heart sounds and intact distal pulses.    No murmur heard.  Tachycardia noted   Pulmonary/Chest: He is in respiratory distress. He has no wheezes. He has no rales. He exhibits no tenderness.   Increased work of breathing with tachypnea    Abdominal: Soft. Bowel sounds are normal. There is no tenderness.   Musculoskeletal: He exhibits no edema or tenderness.   Neurological: He is alert and oriented to person, place, and time.   Skin: Skin is warm and dry. No rash noted.   Nursing note and vitals reviewed.      ED Course   Procedures             EKG Interpretation:      Interpreted by Rio Caceres  Time reviewed:  0900  Symptoms at time of EKG: shortness of breath   Rhythm: sinus tachycardia  Rate: Tachycardia (106 bpm)  Axis: Normal  Ectopy: ectopic ventricular contractions (frequent)  Conduction: normal  ST Segments/ T Waves: No ST-T wave changes and no acute ischemic changes  Q Waves: none  Comparison to prior: No old EKG available    Clinical Impression: sinus tachycardia      Results for orders placed or performed during the hospital encounter of 02/01/17 (from the past 24 hour(s))   Influenza A/B antigen   Result Value Ref Range    Influenza A/B Agn Specimen Nasal     Influenza A Negative NEG    Influenza B  NEG     Negative   Test results must be correlated with clinical data. If necessary, results   should be confirmed by a molecular assay or viral culture.     CBC with platelets  differential   Result Value Ref Range    WBC 5.8 4.0 - 11.0 10e9/L    RBC Count 5.18 4.4 - 5.9 10e12/L    Hemoglobin 15.8 13.3 - 17.7 g/dL    Hematocrit 46.1 40.0 - 53.0 %    MCV 89 78 - 100 fl    MCH 30.5 26.5 - 33.0 pg    MCHC 34.3 31.5 - 36.5 g/dL    RDW 13.1 10.0 - 15.0 %    Platelet Count 235 150 - 450 10e9/L    Diff Method Automated Method     % Neutrophils 61.7 %    % Lymphocytes 20.5 %    % Monocytes 5.7 %    % Eosinophils 10.7 %    % Basophils 0.5 %    % Immature Granulocytes 0.9 %    Absolute Neutrophil 3.6 1.6 - 8.3 10e9/L    Absolute Lymphocytes 1.2 0.8 - 5.3 10e9/L    Absolute Monocytes 0.3 0.0 - 1.3 10e9/L    Absolute Eosinophils 0.6 0.0 - 0.7 10e9/L    Absolute Basophils 0.0 0.0 - 0.2 10e9/L    Abs Immature Granulocytes 0.1 0 - 0.4 10e9/L   Basic metabolic panel   Result Value Ref Range    Sodium 140 133 - 144 mmol/L    Potassium 4.0 3.4 - 5.3 mmol/L    Chloride 107 94 - 109 mmol/L    Carbon Dioxide 25 20 - 32 mmol/L    Anion Gap 8 3 - 14 mmol/L    Glucose 99 70 - 99 mg/dL    Urea Nitrogen 14 7 - 30 mg/dL    Creatinine 1.06 0.66 - 1.25 mg/dL    GFR Estimate 73 >60 mL/min/1.7m2    GFR Estimate If Black 88 >60 mL/min/1.7m2    Calcium 8.9 8.5 - 10.1 mg/dL   Troponin I   Result Value Ref Range    Troponin I ES  0.000 - 0.045 ug/L     <0.015  The 99th percentile for upper reference range is 0.045 ug/L.  Troponin values in   the range of 0.045 - 0.120 ug/L may be associated with risks of adverse   clinical events.     D dimer quantitative   Result Value Ref Range    D Dimer 0.3 0.0 - 0.50 ug/ml FEU   Blood gas venous   Result Value Ref Range    Ph Venous 7.43 7.32 - 7.43 pH    PCO2 Venous 38 (L) 40 - 50 mm Hg    PO2 Venous 50 (H) 25 - 47 mm Hg    Bicarbonate Venous 25 21 - 28 mmol/L    Base Excess Venous 1.1 mmol/L    FIO2 21    Nt probnp inpatient (BNP)   Result Value Ref Range    N-Terminal Pro BNP Inpatient 41 0 - 900 pg/mL   XR Chest Port 1 View    Narrative    CHEST PORTABLE ONE VIEW February 1, 2017  10:01 AM     HISTORY: Chest pain.    COMPARISON: Chest x-rays dated 4/4/2014.    FINDINGS: Lungs are mildly hypoaerated. Minimal left basilar  atelectasis is noted. No fracture or malalignment. No pneumothorax or  significant pleural fluid collection. Heart size and pulmonary  vascularity are grossly within normal limits.      Impression    IMPRESSION: Hypoaeration and probable mild left basilar atelectasis.  No other evidence of acute cardiopulmonary disease is seen.   Chest CT - IV contrast only - PE protocoll    Narrative    CT CHEST PULMONARY EMBOLISM W CONTRAST  2/1/2017 11:52 AM     HISTORY:  Shortness of air, hypoxia. Patient has a history of thyroid  cancer and previous thyroidectomy.    COMPARISON: CT of the chest dated 2/28/2011.    TECHNIQUE: CT pulmonary angiogram was performed following the  administration of 77 cc Isovue-370 contrast. Radiation dose for this  scan was reduced using automated exposure control, adjustment of the  mA and/or kV according to patient size, or iterative reconstruction  technique.    FINDINGS:   There is a 7 mm embolus in a left upper lobe central pulmonary  arterial branch. There is a question of tiny emboli in segmental  branches of the left lower lobe.    There is linear atelectasis in the lingula.    There are small mediastinal and bilateral hilar lymph nodes.      Impression    IMPRESSION:  1. Small pulmonary emboli as above.  2. Nonspecific small mediastinal and hilar lymph nodes.    GARRETT RAJAN MD   US Lower Extremity Venous Duplex Bilateral    Narrative    US LOWER EXTREMITY VENOUS DUPLEX BILATERAL   2/1/2017 2:15 PM     HISTORY: Pulmonary Emboli found on CT- no history. Shortness of  breath.    COMPARISON: None.    FINDINGS: Gray-scale, color and Doppler spectral analysis ultrasound  was performed of the bilateral lower extremities. Compression and  augmentation imaging was performed.    The deep venous systems of the bilateral lower extremities are  fully  compressible.  Spectral Doppler demonstrates normal phasicity and  excellent augmentation with calf compression.  Visualized greater  saphenous and deep calf veins are patent.      Impression    IMPRESSION: No evidence for DVT within either lower extremity.    SHANE ALVARADO MD        Medications   lidocaine 1 % 1 mL (not administered)   lidocaine (LMX4) kit (not administered)   sodium chloride (PF) 0.9% PF flush 3 mL (not administered)   sodium chloride (PF) 0.9% PF flush 3 mL (not administered)   albuterol neb solution 2.5 mg (not administered)   enoxaparin (LOVENOX) injection 120 mg (120 mg Subcutaneous Given 2/1/17 1306)   aspirin chewable tablet 324 mg (324 mg Oral Given 2/1/17 0919)   ipratropium - albuterol 0.5 mg/2.5 mg/3 mL (DUONEB) neb solution 3 mL (3 mLs Nebulization Given 2/1/17 1020)   0.9% sodium chloride BOLUS (0 mLs Intravenous Stopped 2/1/17 1306)   sodium chloride (PF) 0.9% PF flush 3 mL (3 mLs Intravenous Given 2/1/17 1140)   iopamidol (ISOVUE-370) solution 100 mL (77 mLs Intravenous Given 2/1/17 1140)   sodium chloride 0.9 % for CT scan flush dose 500 mL (71 mLs Intravenous Given 2/1/17 1140)        Assessments & Plan (with Medical Decision Making)  Parker is a 50 yo male here with shortness of breath. He has been short of breath since Friday and on Monday was given a course of Amoxicillin by phone after he called the clinic. He is not better. Today he has fatigue and is unable to complete a sentence due to shortness of breath.  His exam shows tachypnea. His labs are normal with CBC, CMP, Troponin, BNP, d dimer and influenza all negative. His chest xray shows left basilar atelectasis.  Despite normal labs (including normal d-dimer) and a stable chest x-ray the patient was very, very short of breath with any exertion.  At rest in the bed he would desaturate to 88/89% on room air.  We did do a CT scan of his chest which shows pulmonary emboli in the left lobes of the lung.  The patient is  going to be brought into the hospital as an observation patient.  I did start him on Lovenox here in the ED.  I spoke with Dr. Quezada about this patient and I did write observation orders.       I have reviewed the nursing notes.    I have reviewed the findings, diagnosis, plan and need for follow up with the patient.    New Prescriptions    No medications on file       Final diagnoses:   Hypoxia (89% at rest on RA)   Other acute pulmonary embolism without acute cor pulmonale (H)       2/1/2017   [facility] EMERGENCY DEPARTMENT      Rio Caceres MD  02/01/17 3936

## 2017-02-01 NOTE — H&P
[facility] History and Physical  2/1/2017    Parker Hunt MRN# [id number]   Age: 51 year old YOB: 1965     Date of Admission:  2/1/2017    Home clinic: [facility]  Primary care provider: Monica Odell          Assessment and Plan:   Assessment:   Active Problems:    Pulmonary emboli (H)    Assessment: 7 mm pulmonary emboli left upper lung per CT scan. Pt complaining of SOB, mild hypoxia in ER (88-89% on room)    Plan: Lovenox has been started to bridge until Coumadin is therapeutic. Possible discharge tomorrow.     Hypoxia  Assessment:Spo2 88-89% on room air in the ER, SOB with activity  Plan:Oxygen PRN  Thyroid cancer  Assessment: History of with partial thyroidectomy.   Plan: No intervention needed.   Asthma  Assessment:current sinus infection, but lungs are clear  Plan:Inhalers at bedside          Admit Core Measures:  Acute MI, CHF, or stroke core measures do not apply for this admission        Chief Complaint:   History is obtained from the patient     Shortness of breath         History of Present Illness:   This patient is a 51 year old  Male who presented to the ER with complaints of shortness of breath. He has had a head cold for about three weeks, but started developing increasing shortness of breath yesterday, and worsening this morning prompting them to come into the ER. In the ER he had difficulty speaking full sentences due to SOB. He had Spo2 88-89%. Lab testing was normal including D Dimer. Lower extremity ultrasound was negative for blood clot. CT scan showed left sided pulmonary embolism. He is observation status for additional monitoring , oxygen therapy, and the initiation of anticoagulation.              Past Medical History:     Past Medical History   Diagnosis Date     NO ACTIVE PROBLEMS      Asthma      as a child              Past Surgical History:      Past Surgical History   Procedure Laterality Date     No history of surgery       Thyroidectomy   4/29/2011     Procedure:THYROIDECTOMY; Jordan Thyroidectomy, Possible Total Thyroidectomy; Surgeon:JOSHUA CHAPPELL; Location:[facility] OR     Colonoscopy  3/21/2014     Procedure: COLONOSCOPY;  colonoscopy;  Surgeon: Sarbjit Sy MD;  Location:  GI             Social History:     Social History   Substance Use Topics     Smoking status: Never Smoker      Smokeless tobacco: Never Used     Alcohol Use: Yes      Comment: 2-3 beers/month             Family History:     Family History   Problem Relation Age of Onset     Respiratory Brother      asthma     C.A.D. Maternal Grandfather      in 60s had heart issues             Immunizations:     Immunization History   Administered Date(s) Administered     Influenza (IIV3) 09/23/2013     TD (ADULT, 7+) 01/01/2001     TDAP (ADACEL AGES 11-64) 04/04/2014             Allergies:     Allergies   Allergen Reactions     No Known Drug Allergies              Medications:     Prescriptions prior to admission   Medication Sig Dispense Refill Last Dose     loratadine (CLARITIN) 10 MG tablet Take 10 mg by mouth daily   2/1/2017 at 0800     guaiFENesin (MUCINEX) 600 MG 12 hr tablet Take 1,200 mg by mouth 2 times daily   2/1/2017 at 0800     diphenhydrAMINE-acetaminophen (TYLENOL PM)  MG tablet Take 1 tablet by mouth nightly as needed for sleep   Past Week at two nites ago.      amoxicillin-clavulanate (AUGMENTIN) 500-125 MG per tablet Take 1 tablet by mouth 3 times daily for 7 days 21 tablet 0 2/1/2017 at 0800     fluticasone (FLOVENT HFA) 110 MCG/ACT inhaler Inhale 2 puffs into the lungs 2 times daily Needs to be seen for follow up. 1 Inhaler 5 2/1/2017 at 0800     albuterol (VENTOLIN HFA) 108 (90 BASE) MCG/ACT inhaler USE 2 PUFFS EVERY SIX HOURS AS NEEDED FOR SHORTNESS OF BREATH AND DIFFICULTY BREATHING 18 g 3      ORDER FOR DME Equipment being ordered: super feet 1 each 0 Not Taking     Current Facility-Administered Medications   Medication     sodium chloride (PF) 0.9% PF flush  3 mL     albuterol neb solution 2.5 mg     enoxaparin (LOVENOX) injection 120 mg     naloxone (NARCAN) injection 0.1-0.4 mg     lidocaine 1 % 1 mL     lidocaine (LMX4) kit     sodium chloride (PF) 0.9% PF flush 3 mL     sodium chloride (PF) 0.9% PF flush 3 mL              Review of Systems:   C: NEGATIVE for fever, chills, change in weight  I: NEGATIVE for worrisome rashes, moles or lesions  E: NEGATIVE for vision changes or irritation  ENT/MOUTH: POSITIVE for nasal congestion, postnasal drainage, rhinorrhea-clear and sinus pressure  RESP:POSITIVE for cough-productive, dyspnea on exertion, Hx asthma and SOB/dyspnea  CV: POSITIVE chest pain/pressure and dyspnea on exertion  GI: NEGATIVE for nausea, abdominal pain, heartburn, or change in bowel habits  : NEGATIVE for frequency, dysuria, or hematuria  M: NEGATIVE for significant arthralgias or myalgia  N: NEGATIVE for weakness, dizziness or paresthesias  H: NEGATIVE for bleeding problems  P: NEGATIVE for changes in mood or affect          Physical Exam:   Vitals were reviewed  Temp:  [95.7  F (35.4  C)-97  F (36.1  C)] 95.7  F (35.4  C)  Pulse:  [] 80  Heart Rate:  [] 107  Resp:  [15-34] 20  BP: (142-203)/() 153/97 mmHg  SpO2:  [90 %-97 %] 97 %  No intake or output data in the 24 hours ending 02/01/17 4237    Constitutional:   awake, alert, cooperative, mild respiratory distress     Eyes:   Lids and lashes normal, pupils equal, round and reactive to light,sclera clear, conjunctiva normal     ENT:   normocepalic, without obvious abnormality, atramatic, maxillary sinuses tender bilaterally, tympanic membranes intact bilaterally, clear discharge, oral pharynx with moist mucus membranes     Neck:   Supple, symmetrical, trachea midline, no adenopathy     Lungs:   tachypneic, Mild respiratory distress, good air exchange and clear to auscultation     Cardiovascular:   regular rate and rhythm     Abdomen:   No scars, normal bowel sounds, soft,  non-distended, non-tender, no masses palpated, no hepatosplenomegally     Musculoskeletal:   no lower extremity pitting edema present               Data:     Results for orders placed or performed during the hospital encounter of 02/01/17   XR Chest Port 1 View    Narrative    CHEST PORTABLE ONE VIEW February 1, 2017 10:01 AM     HISTORY: Chest pain.    COMPARISON: Chest x-rays dated 4/4/2014.    FINDINGS: Lungs are mildly hypoaerated. Minimal left basilar  atelectasis is noted. No fracture or malalignment. No pneumothorax or  significant pleural fluid collection. Heart size and pulmonary  vascularity are grossly within normal limits.      Impression    IMPRESSION: Hypoaeration and probable mild left basilar atelectasis.  No other evidence of acute cardiopulmonary disease is seen.   Chest CT - IV contrast only - PE protocoll    Narrative    CT CHEST PULMONARY EMBOLISM W CONTRAST  2/1/2017 11:52 AM     HISTORY:  Shortness of air, hypoxia. Patient has a history of thyroid  cancer and previous thyroidectomy.    COMPARISON: CT of the chest dated 2/28/2011.    TECHNIQUE: CT pulmonary angiogram was performed following the  administration of 77 cc Isovue-370 contrast. Radiation dose for this  scan was reduced using automated exposure control, adjustment of the  mA and/or kV according to patient size, or iterative reconstruction  technique.    FINDINGS:   There is a 7 mm embolus in a left upper lobe central pulmonary  arterial branch. There is a question of tiny emboli in segmental  branches of the left lower lobe.    There is linear atelectasis in the lingula.    There are small mediastinal and bilateral hilar lymph nodes.      Impression    IMPRESSION:  1. Small pulmonary emboli as above.  2. Nonspecific small mediastinal and hilar lymph nodes.    GARRETT RAJAN MD   US Lower Extremity Venous Duplex Bilateral    Narrative    US LOWER EXTREMITY VENOUS DUPLEX BILATERAL   2/1/2017 2:15 PM     HISTORY: Pulmonary Emboli  found on CT- no history. Shortness of  breath.    COMPARISON: None.    FINDINGS: Gray-scale, color and Doppler spectral analysis ultrasound  was performed of the bilateral lower extremities. Compression and  augmentation imaging was performed.    The deep venous systems of the bilateral lower extremities are fully  compressible.  Spectral Doppler demonstrates normal phasicity and  excellent augmentation with calf compression.  Visualized greater  saphenous and deep calf veins are patent.      Impression    IMPRESSION: No evidence for DVT within either lower extremity.    SHANE ALVARADO MD   CBC with platelets differential   Result Value Ref Range    WBC 5.8 4.0 - 11.0 10e9/L    RBC Count 5.18 4.4 - 5.9 10e12/L    Hemoglobin 15.8 13.3 - 17.7 g/dL    Hematocrit 46.1 40.0 - 53.0 %    MCV 89 78 - 100 fl    MCH 30.5 26.5 - 33.0 pg    MCHC 34.3 31.5 - 36.5 g/dL    RDW 13.1 10.0 - 15.0 %    Platelet Count 235 150 - 450 10e9/L    Diff Method Automated Method     % Neutrophils 61.7 %    % Lymphocytes 20.5 %    % Monocytes 5.7 %    % Eosinophils 10.7 %    % Basophils 0.5 %    % Immature Granulocytes 0.9 %    Absolute Neutrophil 3.6 1.6 - 8.3 10e9/L    Absolute Lymphocytes 1.2 0.8 - 5.3 10e9/L    Absolute Monocytes 0.3 0.0 - 1.3 10e9/L    Absolute Eosinophils 0.6 0.0 - 0.7 10e9/L    Absolute Basophils 0.0 0.0 - 0.2 10e9/L    Abs Immature Granulocytes 0.1 0 - 0.4 10e9/L   Basic metabolic panel   Result Value Ref Range    Sodium 140 133 - 144 mmol/L    Potassium 4.0 3.4 - 5.3 mmol/L    Chloride 107 94 - 109 mmol/L    Carbon Dioxide 25 20 - 32 mmol/L    Anion Gap 8 3 - 14 mmol/L    Glucose 99 70 - 99 mg/dL    Urea Nitrogen 14 7 - 30 mg/dL    Creatinine 1.06 0.66 - 1.25 mg/dL    GFR Estimate 73 >60 mL/min/1.7m2    GFR Estimate If Black 88 >60 mL/min/1.7m2    Calcium 8.9 8.5 - 10.1 mg/dL   Troponin I   Result Value Ref Range    Troponin I ES  0.000 - 0.045 ug/L     <0.015  The 99th percentile for upper reference range is 0.045 ug/L.   Troponin values in   the range of 0.045 - 0.120 ug/L may be associated with risks of adverse   clinical events.     D dimer quantitative   Result Value Ref Range    D Dimer 0.3 0.0 - 0.50 ug/ml FEU   Blood gas venous   Result Value Ref Range    Ph Venous 7.43 7.32 - 7.43 pH    PCO2 Venous 38 (L) 40 - 50 mm Hg    PO2 Venous 50 (H) 25 - 47 mm Hg    Bicarbonate Venous 25 21 - 28 mmol/L    Base Excess Venous 1.1 mmol/L    FIO2 21    Nt probnp inpatient (BNP)   Result Value Ref Range    N-Terminal Pro BNP Inpatient 41 0 - 900 pg/mL   Influenza A/B antigen   Result Value Ref Range    Influenza A/B Agn Specimen Nasal     Influenza A Negative NEG    Influenza B  NEG     Negative   Test results must be correlated with clinical data. If necessary, results   should be confirmed by a molecular assay or viral culture.        All cardiac studies reviewed by me.   All imaging studies reviewed by me.      Attestation:  I have reviewed today's vital signs, notes, medications, labs and imaging.     Tj Mendez PA-C

## 2017-02-01 NOTE — PHARMACY-ANTICOAGULATION SERVICE
Clinical Pharmacy - Warfarin Dosing Consult     Pharmacy has been consulted to manage this patient s warfarin therapy.  Indication: DVT/ PE Treatment  Therapy Goal: INR 2-3  Warfarin Prior to Admission: No  Recent documented change in oral intake/nutrition: Unknown    No results found for: INR, [id number], F2    Recommend warfarin 7.5 mg today.  Pharmacy will monitor Parker Hunt daily and order warfarin doses to achieve specified goal.      Please contact pharmacy as soon as possible if the warfarin needs to be held for a procedure or if the warfarin goals change.        Sofia Powers, PharmD Student

## 2017-02-02 PROBLEM — J01.90 ACUTE SINUSITIS: Status: ACTIVE | Noted: 2017-02-02

## 2017-02-02 PROBLEM — R06.03 ACUTE RESPIRATORY DISTRESS: Status: ACTIVE | Noted: 2017-02-02

## 2017-02-02 PROBLEM — J45.41 MODERATE PERSISTENT ASTHMA WITH ACUTE EXACERBATION: Status: ACTIVE | Noted: 2017-02-02

## 2017-02-02 PROBLEM — E66.9 OBESITY: Status: ACTIVE | Noted: 2017-02-02

## 2017-02-02 PROBLEM — J45.901 ASTHMA EXACERBATION: Status: ACTIVE | Noted: 2017-02-02

## 2017-02-02 LAB — INR PPP: 1.04 (ref 0.86–1.14)

## 2017-02-02 PROCEDURE — 25000308 HC RX OP HPI UCR WEL MED 250 IP 250: Performed by: PEDIATRICS

## 2017-02-02 PROCEDURE — 25000132 ZZH RX MED GY IP 250 OP 250 PS 637: Performed by: PEDIATRICS

## 2017-02-02 PROCEDURE — 94640 AIRWAY INHALATION TREATMENT: CPT

## 2017-02-02 PROCEDURE — 85610 PROTHROMBIN TIME: CPT | Performed by: PHYSICIAN ASSISTANT

## 2017-02-02 PROCEDURE — 99233 SBSQ HOSP IP/OBS HIGH 50: CPT | Performed by: PEDIATRICS

## 2017-02-02 PROCEDURE — 25000128 H RX IP 250 OP 636: Performed by: FAMILY MEDICINE

## 2017-02-02 PROCEDURE — 25000125 ZZHC RX 250: Performed by: PEDIATRICS

## 2017-02-02 PROCEDURE — G0378 HOSPITAL OBSERVATION PER HR: HCPCS

## 2017-02-02 PROCEDURE — 25000132 ZZH RX MED GY IP 250 OP 250 PS 637: Performed by: FAMILY MEDICINE

## 2017-02-02 PROCEDURE — 12000000 ZZH R&B MED SURG/OB

## 2017-02-02 PROCEDURE — 36415 COLL VENOUS BLD VENIPUNCTURE: CPT | Performed by: PHYSICIAN ASSISTANT

## 2017-02-02 PROCEDURE — 25000308 HC RX OP HPI UCR WEL MED 250 IP 250: Performed by: PHYSICIAN ASSISTANT

## 2017-02-02 RX ORDER — OXYMETAZOLINE HYDROCHLORIDE 0.05 G/100ML
2 SPRAY NASAL 2 TIMES DAILY
Status: DISPENSED | OUTPATIENT
Start: 2017-02-02 | End: 2017-02-05

## 2017-02-02 RX ORDER — ALBUTEROL SULFATE 90 UG/1
2 AEROSOL, METERED RESPIRATORY (INHALATION)
Status: DISCONTINUED | OUTPATIENT
Start: 2017-02-02 | End: 2017-02-03

## 2017-02-02 RX ORDER — ALBUTEROL SULFATE 0.83 MG/ML
3 SOLUTION RESPIRATORY (INHALATION)
Status: DISCONTINUED | OUTPATIENT
Start: 2017-02-02 | End: 2017-02-07 | Stop reason: HOSPADM

## 2017-02-02 RX ORDER — PREDNISONE 20 MG/1
40 TABLET ORAL DAILY
Status: DISCONTINUED | OUTPATIENT
Start: 2017-02-02 | End: 2017-02-04

## 2017-02-02 RX ADMIN — AMOXICILLIN AND CLAVULANATE POTASSIUM 1 TABLET: 500; 125 TABLET, FILM COATED ORAL at 06:06

## 2017-02-02 RX ADMIN — LISINOPRIL 5 MG: 2.5 TABLET ORAL at 08:01

## 2017-02-02 RX ADMIN — ALBUTEROL SULFATE 2 PUFF: 90 AEROSOL, METERED RESPIRATORY (INHALATION) at 21:32

## 2017-02-02 RX ADMIN — ALBUTEROL SULFATE 2.5 MG: 2.5 SOLUTION RESPIRATORY (INHALATION) at 15:17

## 2017-02-02 RX ADMIN — ENOXAPARIN SODIUM 120 MG: 120 INJECTION SUBCUTANEOUS at 12:55

## 2017-02-02 RX ADMIN — ENOXAPARIN SODIUM 120 MG: 120 INJECTION SUBCUTANEOUS at 23:58

## 2017-02-02 RX ADMIN — WARFARIN SODIUM 7.5 MG: 5 TABLET ORAL at 18:11

## 2017-02-02 RX ADMIN — AMOXICILLIN AND CLAVULANATE POTASSIUM 1 TABLET: 500; 125 TABLET, FILM COATED ORAL at 21:32

## 2017-02-02 RX ADMIN — OXYMETAZOLINE HYDROCHLORIDE 2 SPRAY: 5 SPRAY NASAL at 12:55

## 2017-02-02 RX ADMIN — ALBUTEROL SULFATE 2.5 MG: 2.5 SOLUTION RESPIRATORY (INHALATION) at 18:52

## 2017-02-02 RX ADMIN — ALBUTEROL SULFATE 2.5 MG: 2.5 SOLUTION RESPIRATORY (INHALATION) at 13:02

## 2017-02-02 RX ADMIN — ALBUTEROL SULFATE 2 PUFF: 90 AEROSOL, METERED RESPIRATORY (INHALATION) at 14:37

## 2017-02-02 RX ADMIN — ALBUTEROL SULFATE 2.5 MG: 2.5 SOLUTION RESPIRATORY (INHALATION) at 08:04

## 2017-02-02 RX ADMIN — ALBUTEROL SULFATE 2 PUFF: 90 AEROSOL, METERED RESPIRATORY (INHALATION) at 18:10

## 2017-02-02 RX ADMIN — PREDNISONE 40 MG: 20 TABLET ORAL at 12:55

## 2017-02-02 RX ADMIN — AMOXICILLIN AND CLAVULANATE POTASSIUM 1 TABLET: 500; 125 TABLET, FILM COATED ORAL at 14:22

## 2017-02-02 RX ADMIN — OXYMETAZOLINE HYDROCHLORIDE 2 SPRAY: 5 SPRAY NASAL at 21:32

## 2017-02-02 RX ADMIN — ENOXAPARIN SODIUM 120 MG: 120 INJECTION SUBCUTANEOUS at 00:11

## 2017-02-02 ASSESSMENT — ACTIVITIES OF DAILY LIVING (ADL)
DRESS: 0-->INDEPENDENT
BATHING: 0-->INDEPENDENT
RETIRED_COMMUNICATION: 0-->UNDERSTANDS/COMMUNICATES WITHOUT DIFFICULTY
RETIRED_EATING: 0-->INDEPENDENT
TOILETING: 0-->INDEPENDENT
COGNITION: 0 - NO COGNITION ISSUES REPORTED
FALL_HISTORY_WITHIN_LAST_SIX_MONTHS: NO
AMBULATION: 0-->INDEPENDENT
TRANSFERRING: 0-->INDEPENDENT
SWALLOWING: 0-->SWALLOWS FOODS/LIQUIDS WITHOUT DIFFICULTY

## 2017-02-02 NOTE — PROGRESS NOTES
MDI instruct with spacer done. Pt shows good understanding and return demonstration. Peak flows done before and after MDI. PF before is 200 and after is 250. Pt shows good effort.

## 2017-02-02 NOTE — PROGRESS NOTES
Patient alert and oriented, 02 sats 92% on RA, /104 provider notified, started on lisinopril 5mg, denies any discomfort, patient congested and wheezy offered neb and patient refused d/t cost.

## 2017-02-02 NOTE — H&P
[facility]    History and Physical  Hospitalist       Date of Admission:  2/1/2017  Date of Service (when I saw the patient): 02/02/2017    Assessment and Plan  Parker Hunt is a 51 year old male who presented with dyspnea and was hospitalized yesterday with left pulmonary embolism.  Although he has been treated with anticoagulation, he has continued to have intermittent hypoxia with persistent dyspnea and today is now demonstrating worsening signs of asthma exacerbation.  He is worsening clinically despite an initial period of observation and treatment in the hospital and appears to be at high-risk for an adverse outcome including worsening respiratory distress and respiratory failure, so ongoing hospitalization is considered medically necessary.    Active Problems:    Acute pulmonary embolism (H)    Moderate persistent asthma with acute exacerbation    Hypoxia    Acute respiratory distress (H)    Acute sinusitis    Hypertension    History of thyroid cancer    Obesity    Admit to inpatient  Continue Lovenox with warfarin until INR is therapeutic  Start systemic corticosteroids and continue inhaled corticosteroid  Increased frequency of bronchodilator administration  Monitor oxygenation continuously  Titrate oxygen to maintain adequate oxygenation, add assisted ventilation if necessary  Continue Augmentin treatment for recently diagnosed sinus infection  Add Nasal Decongestant to treat severe nasal congestion and optimize oxygen delivery  Continue lisinopril for treatment of hypertension    DVT Prophylaxis: Enoxaprain (Lovenox) SQ and Warfarin  Code Status: Full Code    Disposition: Expected discharge in 2-3 days.    Alexy Quezada MD    Primary Care Physician  Monica Odell    Chief Complaint  Shortness of breath    History is obtained from the patient and electronic health record    History of Present Illness  Parker Hunt is a 51 year old male who presents with shortness  of breath.  He was diagnosed with sinus infection about a week ago and has been taking  Augmentin.  Over the past few days, he has had dry cough and has noted worsening dyspnea with sense of some heaviness in his left chest.  He tried using  His rescue inhaler which did not seem to help.  He presented to the ER yesterday and was found to have left-sided pulmonary emboli.  He was hospitalized and started on Lovenox and warfarin.  In the hospital, his oxygenation has fluctuated with saturations as low as the mid 80s, so he has been using oxygen on and off.  Today he continues to feel moderately short of breath with much more exertional dyspnea than his normal baseline.  Oxygen seems to help, but he did not notice much improvement from using his albuterol inhaler.  He is now seen in his hospital room for reevaluation.    Past Medical History   I have reviewed this patient's medical history and updated it with pertinent information if needed.     He has moderate persistent asthma  Normally well controlled with Flovent.  He has not been hospitalized for in recent years.  He has never had blood clots previously.  He has undergone treatment of thyroid cancer with surgery.    Past Surgical History  I have reviewed this patient's surgical history and updated it with pertinent information if needed.  Past Surgical History   Procedure Laterality Date     No history of surgery       Thyroidectomy  4/29/2011     Procedure:THYROIDECTOMY; Jordan Thyroidectomy, Possible Total Thyroidectomy; Surgeon:JOSHUA CHAPPELL; Location:[facility]     Colonoscopy  3/21/2014     Procedure: COLONOSCOPY;  colonoscopy;  Surgeon: Sarbjit Sy MD;  Location:  GI       Prior to Admission Medications  Prior to Admission Medications   Prescriptions Last Dose Informant Patient Reported? Taking?   ORDER FOR DME   No No   Sig: Equipment being ordered: super feet   albuterol (VENTOLIN HFA) 108 (90 BASE) MCG/ACT inhaler   No Yes   Sig: USE 2 PUFFS EVERY  SIX HOURS AS NEEDED FOR SHORTNESS OF BREATH AND DIFFICULTY BREATHING   amoxicillin-clavulanate (AUGMENTIN) 500-125 MG per tablet 2/1/2017 at 0800  No Yes   Sig: Take 1 tablet by mouth 3 times daily for 7 days   diphenhydrAMINE-acetaminophen (TYLENOL PM)  MG tablet Past Week at two nites ago.   Yes Yes   Sig: Take 1 tablet by mouth nightly as needed for sleep   fluticasone (FLOVENT HFA) 110 MCG/ACT inhaler 2/1/2017 at 0800  No Yes   Sig: Inhale 2 puffs into the lungs 2 times daily Needs to be seen for follow up.   guaiFENesin (MUCINEX) 600 MG 12 hr tablet 2/1/2017 at 0800  Yes Yes   Sig: Take 1,200 mg by mouth 2 times daily   loratadine (CLARITIN) 10 MG tablet 2/1/2017 at 0800  Yes Yes   Sig: Take 10 mg by mouth daily      Facility-Administered Medications: None     Allergies  Allergies   Allergen Reactions     No Known Drug Allergies        Social History  I have reviewed this patient's social history and updated it with pertinent information if needed. Parker Hunt  reports that he has never smoked. He has never used smokeless tobacco. He reports that he drinks alcohol. He reports that he does not use illicit drugs.    Family History  I have reviewed this patient's family history and updated it with pertinent information if needed.   Family History   Problem Relation Age of Onset     Respiratory Brother      asthma     C.A.D. Maternal Grandfather      in 60s had heart issues       Review of Systems  CONSTITUTIONAL:  negative for  fevers and chills  EYES:  negative for  eye discharge and visual disturbance  HEENT:  positive for  nasal congestion  negative for  sore throat  RESPIRATORY:  positive for  cough with sputum  negative for  hemoptysis and pleuritic pain  CARDIOVASCULAR:  Positive for palpitations and dizziness, negative for  syncope  GASTROINTESTINAL:  negative for nausea, vomiting, change in bowel habits and abdominal pain  GENITOURINARY:  negative for dysuria  INTEGUMENT/BREAST:  negative for  rash  HEMATOLOGIC/LYMPHATIC:  negative for bleeding  ALLERGIC/IMMUNOLOGIC:  negative for drug reactions  MUSCULOSKELETAL:  negative for  myalgias, arthralgias and joint swelling  NEUROLOGICAL:  Positive for headaches, negative for weakness    Physical Exam    Wt Readings from Last 1 Encounters:   02/01/17 133.5 kg (294 lb 5 oz)     Wt Readings from Last 3 Encounters:   02/01/17 133.5 kg (294 lb 5 oz)   03/01/16 132.859 kg (292 lb 14.4 oz)   06/04/15 134.265 kg (296 lb)     Current weight is remained the same compared with previously.    Vitals were reviewed  Patient Vitals for the past 24 hrs:   BP Temp Temp src Pulse Heart Rate Resp SpO2 Weight   02/02/17 1300 - - - - - - 94 % -   02/02/17 0800 - - - - - - 92 % -   02/02/17 0750 - - - - - - (!) 87 % -   02/02/17 0712 (!) 163/102 mmHg 97.3  F (36.3  C) Oral 94 - 20 93 % -   02/01/17 2307 164/89 mmHg 96.7  F (35.9  C) Oral 97 97 16 92 % -   02/01/17 1900 - - - - - - 94 % -   02/01/17 1856 (!) 166/104 mmHg 98  F (36.7  C) Oral 101 101 20 94 % -   02/01/17 1500 (!) 153/97 mmHg 95.7  F (35.4  C) Oral - 107 20 97 % 133.5 kg (294 lb 5 oz)       Constitutional: ill-appearing with signs of mild respiratory distress while sitting up in bed  Eyes: anicteric sclerae, clear conjunctivae  HEENT: no ear drainage, severely congested nares bilaterally, no oral lesions  Neck: trachea midline, nontender, no stridor, somewhat hoarse voice  Lymph/Hematologic: no cervical or supraclavicular lymphadenopathy  Chest: no gross anomalies, symmetric excursion  Respiratory: mildly tachypneic, obviously dyspneic with speaking but able to speak full sentences,, mildly to moderately diminished breath sounds throughout  With diffuse high-pitched expiratory wheezes and prolonged expiratory phase  Cardiovascular: tachycardic with regular rhythm, no gallop or murmur, good radial pulse, normal capillary refill  GI: obese, soft abdomen, nontender  Skin: diaphoretic, normal color, no  rash  Musculoskeletal: no edema in the limbs, joints appear normal  Neurologic: normal mental status, no overt focal deficits    Data  Data reviewed today:  I personally reviewed telemetry which has not demonstrated any dysrhythmias over the last 24 hours although he has had persistent  Sinus tachycardia.    Recent Labs  Lab 02/02/17  0518 02/01/17  0930   WBC  --  5.8   HGB  --  15.8   MCV  --  89   PLT  --  235   INR 1.04 0.99   NA  --  140   POTASSIUM  --  4.0   CHLORIDE  --  107   CO2  --  25   BUN  --  14   CR  --  1.06   ANIONGAP  --  8   BHARATH  --  8.9   GLC  --  99   TROPI  --  <0.015The 99th percentile for upper reference range is 0.045 ug/L.  Troponin values in the range of 0.045 - 0.120 ug/L may be associated with risks of adverse clinical events.       Results for orders placed or performed during the hospital encounter of 02/01/17 (from the past 24 hour(s))   US Lower Extremity Venous Duplex Bilateral    Narrative    US LOWER EXTREMITY VENOUS DUPLEX BILATERAL   2/1/2017 2:15 PM     HISTORY: Pulmonary Emboli found on CT- no history. Shortness of  breath.    COMPARISON: None.    FINDINGS: Gray-scale, color and Doppler spectral analysis ultrasound  was performed of the bilateral lower extremities. Compression and  augmentation imaging was performed.    The deep venous systems of the bilateral lower extremities are fully  compressible.  Spectral Doppler demonstrates normal phasicity and  excellent augmentation with calf compression.  Visualized greater  saphenous and deep calf veins are patent.      Impression    IMPRESSION: No evidence for DVT within either lower extremity.    SHANE ALVARADO MD   Pharmacy to dose warfarin    Narrative    Cam Harris MICHELLE     2/2/2017 11:46 AM  See Pharmacy note from 02/01/17 note   INR   Result Value Ref Range    INR 1.04 0.86 - 1.14        Recent Results (from the past 24 hour(s))   US Lower Extremity Venous Duplex Bilateral    Narrative    US LOWER EXTREMITY VENOUS  DUPLEX BILATERAL   2/1/2017 2:15 PM     HISTORY: Pulmonary Emboli found on CT- no history. Shortness of  breath.    COMPARISON: None.    FINDINGS: Gray-scale, color and Doppler spectral analysis ultrasound  was performed of the bilateral lower extremities. Compression and  augmentation imaging was performed.    The deep venous systems of the bilateral lower extremities are fully  compressible.  Spectral Doppler demonstrates normal phasicity and  excellent augmentation with calf compression.  Visualized greater  saphenous and deep calf veins are patent.      Impression    IMPRESSION: No evidence for DVT within either lower extremity.    SHANE ALVARADO MD

## 2017-02-02 NOTE — PHARMACY-ANTICOAGULATION SERVICE
Clinical Pharmacy - Warfarin Dosing Consult     Pharmacy has been consulted to manage this patient s warfarin therapy.  Indication: DVT/ PE Treatment  Therapy Goal: INR 2-3  Warfarin Prior to Admission: No  Interacting medications: Augmentin, lovenoz  Recent documented change in oral intake/nutrition: Unknown    INR   Date Value Ref Range Status   02/02/2017 1.04 0.86 - 1.14 Final   02/01/2017 0.99 0.86 - 1.14 Final       Recommend warfarin 7.5 mg today.  Pharmacy will monitor Parker Hunt daily and order warfarin doses to achieve specified goal.      Please contact pharmacy as soon as possible if the warfarin needs to be held for a procedure or if the warfarin goals change.      Sofia Powers, PharmD Student

## 2017-02-03 LAB
INR PPP: 1.67 (ref 0.86–1.14)
LACTATE BLD-SCNC: 2.5 MMOL/L (ref 0.7–2.1)

## 2017-02-03 PROCEDURE — 99232 SBSQ HOSP IP/OBS MODERATE 35: CPT | Performed by: FAMILY MEDICINE

## 2017-02-03 PROCEDURE — 25000125 ZZHC RX 250: Performed by: PEDIATRICS

## 2017-02-03 PROCEDURE — 12000000 ZZH R&B MED SURG/OB

## 2017-02-03 PROCEDURE — 25000128 H RX IP 250 OP 636: Performed by: FAMILY MEDICINE

## 2017-02-03 PROCEDURE — 94640 AIRWAY INHALATION TREATMENT: CPT

## 2017-02-03 PROCEDURE — 25000132 ZZH RX MED GY IP 250 OP 250 PS 637: Performed by: FAMILY MEDICINE

## 2017-02-03 PROCEDURE — 36415 COLL VENOUS BLD VENIPUNCTURE: CPT | Performed by: PEDIATRICS

## 2017-02-03 PROCEDURE — 94640 AIRWAY INHALATION TREATMENT: CPT | Mod: 76

## 2017-02-03 PROCEDURE — 25000125 ZZHC RX 250: Performed by: FAMILY MEDICINE

## 2017-02-03 PROCEDURE — 85610 PROTHROMBIN TIME: CPT | Performed by: PEDIATRICS

## 2017-02-03 PROCEDURE — 83605 ASSAY OF LACTIC ACID: CPT | Performed by: PEDIATRICS

## 2017-02-03 PROCEDURE — 25000132 ZZH RX MED GY IP 250 OP 250 PS 637: Performed by: PEDIATRICS

## 2017-02-03 PROCEDURE — 25000308 HC RX OP HPI UCR WEL MED 250 IP 250: Performed by: PEDIATRICS

## 2017-02-03 RX ORDER — IPRATROPIUM BROMIDE AND ALBUTEROL SULFATE 2.5; .5 MG/3ML; MG/3ML
3 SOLUTION RESPIRATORY (INHALATION) EVERY 4 HOURS
Status: DISCONTINUED | OUTPATIENT
Start: 2017-02-03 | End: 2017-02-06

## 2017-02-03 RX ORDER — WARFARIN SODIUM 5 MG/1
5 TABLET ORAL
Status: COMPLETED | OUTPATIENT
Start: 2017-02-03 | End: 2017-02-03

## 2017-02-03 RX ADMIN — ALBUTEROL SULFATE 2.5 MG: 2.5 SOLUTION RESPIRATORY (INHALATION) at 08:12

## 2017-02-03 RX ADMIN — WARFARIN SODIUM 5 MG: 5 TABLET ORAL at 17:59

## 2017-02-03 RX ADMIN — ENOXAPARIN SODIUM 120 MG: 120 INJECTION SUBCUTANEOUS at 14:37

## 2017-02-03 RX ADMIN — ALBUTEROL SULFATE 2 PUFF: 90 AEROSOL, METERED RESPIRATORY (INHALATION) at 10:37

## 2017-02-03 RX ADMIN — IPRATROPIUM BROMIDE AND ALBUTEROL SULFATE 3 ML: .5; 3 SOLUTION RESPIRATORY (INHALATION) at 21:30

## 2017-02-03 RX ADMIN — AMOXICILLIN AND CLAVULANATE POTASSIUM 1 TABLET: 500; 125 TABLET, FILM COATED ORAL at 05:41

## 2017-02-03 RX ADMIN — IPRATROPIUM BROMIDE AND ALBUTEROL SULFATE 3 ML: .5; 3 SOLUTION RESPIRATORY (INHALATION) at 17:59

## 2017-02-03 RX ADMIN — OXYMETAZOLINE HYDROCHLORIDE 2 SPRAY: 5 SPRAY NASAL at 21:30

## 2017-02-03 RX ADMIN — AMOXICILLIN AND CLAVULANATE POTASSIUM 1 TABLET: 500; 125 TABLET, FILM COATED ORAL at 21:30

## 2017-02-03 RX ADMIN — AMOXICILLIN AND CLAVULANATE POTASSIUM 1 TABLET: 500; 125 TABLET, FILM COATED ORAL at 13:35

## 2017-02-03 RX ADMIN — LISINOPRIL 5 MG: 2.5 TABLET ORAL at 10:38

## 2017-02-03 RX ADMIN — ALBUTEROL SULFATE 2 PUFF: 90 AEROSOL, METERED RESPIRATORY (INHALATION) at 13:35

## 2017-02-03 RX ADMIN — ALBUTEROL SULFATE 2.5 MG: 2.5 SOLUTION RESPIRATORY (INHALATION) at 13:59

## 2017-02-03 RX ADMIN — OXYMETAZOLINE HYDROCHLORIDE 2 SPRAY: 5 SPRAY NASAL at 10:39

## 2017-02-03 RX ADMIN — ALBUTEROL SULFATE 2.5 MG: 2.5 SOLUTION RESPIRATORY (INHALATION) at 01:38

## 2017-02-03 RX ADMIN — ALBUTEROL SULFATE 2 PUFF: 90 AEROSOL, METERED RESPIRATORY (INHALATION) at 05:41

## 2017-02-03 RX ADMIN — PREDNISONE 40 MG: 20 TABLET ORAL at 10:38

## 2017-02-03 NOTE — PROGRESS NOTES
[facility] Progress Note          Assessment and Plan:   Assessment:   Principal Problem:    Acute pulmonary embolism (H)    Assessment:  Patient continues to have O2 supplementation needs with worsening wheezing and concern for asthma exacerbation as below. Is currently on Coumadin and Lovenox with INR starting to increase.    Plan:  Continue with Lovenox and Coumadin. Patient is aware that he will need to continue on the Lovenox until his INR is therapeutic ×2 days. We'll continue to wean O2 supplementation as tolerated and proceed with treatment of underlying asthma exacerbation as well with steroids, scheduled DuoNeb's, and p.r.n. albuterol nebs. Anticipate discharge in the next 1-3 days as patient's respiratory symptoms stabilize and appropriate disposition planning is identified.    Active Problems:    Hypoxia; Acute respiratory distress (H)    Assessment: Thought to be multifactorial and a combination of patient's acute pulmonary embolism, acute asthma exacerbation as well as significant nasal congestion and inability to breathe through his nose    Plan:   Continue with treatment of the pulmonary emboli, asthma exacerbation, and acute sinusitis and wean O2 supplementation as tolerated      Moderate persistent asthma with acute exacerbation    Assessment:  Patient continues to have diffuse tightness and wheezing    Plan:  Continue with prednisone but will also start scheduled DuoNeb's and continue with p.r.n. albuterol nebs. Wean O2 supplementation as tolerated.      Hypertension    Assessment:  Blood pressures continued to be persistently elevated lisinopril was just started,     Plan:  Continue to monitor blood pressure closely and titrate up on lisinopril tomorrow if no significant change in blood pressure as noted.      Acute sinusitis    Assessment:  On Augmentin with symptoms stable    Plan:  Continue with Augmentin and monitor for progressive improvement.      Obesity    Assessment:  Chronic and  "stable    Plan:  No acute intervention is needed      History of thyroid cancer    Assessment:  No known recurrence    Plan:  Outpatient monitoring is needed         VTE:  Lovenox/Coumadin  Code Status:  Full Code        Interval History:   Appears worse today - having increased O2 needs, increased wheezing diffusely and worsening SOB ever since a coughing episode last night.  Blood pressure stable, HR in the  range at rest, does increase further with activity.  Not responding well to interventions and the treatment plan.  New concerns include worsening wheezing and increased O2 needs          Significant Problems:     Past Medical History   Diagnosis Date     NO ACTIVE PROBLEMS      Asthma      as a child             Physical Exam:   Blood pressure 159/91, pulse 101, temperature 98.4  F (36.9  C), temperature source Oral, resp. rate 20, height 1.93 m (6' 4\"), weight 133.5 kg (294 lb 5 oz), SpO2 96 %.  Constitutional:   awake, alert, cooperative, no apparent distress, and appears stated age     Lungs:   No increased work of breathing, decreased air movement diffusely with significant expiratory wheezing diffusely, no crackles.     Cardiovascular:   Sinus tachycardia with HR in the 100 range during this visit.     Abdomen:   Bowel sounds present, abdomen soft and non-tender     Musculoskeletal:   no lower extremity pitting edema present     Neurologic:   Awake, alert, oriented to name, place and time.       Skin:   normal skin color, texture, turgor             Data:   All laboratory data reviewed    Attestation:  I have reviewed today's vital signs, notes, medications, labs and imaging.     Electronically Signed:  Vivian Cohn MD    Note: Chart documentation done in part with Dragon Voice Recognition software. Although reviewed after completion, some word and grammatical errors may remain.     "

## 2017-02-03 NOTE — PLAN OF CARE
Problem: Goal Outcome Summary  Goal: Goal Outcome Summary  Outcome: Improving  Patient continues to be congested with expiratory wheezes throughout, albuterol neb given, patient reports feeling better, O2 sats 96% on 3L oxymask, denies any discomfort, SOB with exertion.

## 2017-02-03 NOTE — PHARMACY-ANTICOAGULATION SERVICE
Clinical Pharmacy - Warfarin Dosing Consult     Pharmacy has been consulted to manage this patient s warfarin therapy.  Indication: DVT/ PE Treatment  Therapy Goal: INR 2-3  Warfarin Prior to Admission: No  Recent documented change in oral intake/nutrition: Unknown    INR   Date Value Ref Range Status   02/03/2017 1.67* 0.86 - 1.14 Final   02/02/2017 1.04 0.86 - 1.14 Final       Recommend warfarin 5 mg today.  Pharmacy will monitor Parker JAMES Gilbert daily and order warfarin doses to achieve specified goal.      Please contact pharmacy as soon as possible if the warfarin needs to be held for a procedure or if the warfarin goals change.  Chintan Ross, PharmD

## 2017-02-03 NOTE — PROGRESS NOTES
S  Respiratory Care B  PE A  Breath sounds diminished with expiratory wheezes throughout. Has requested two PRN Albuterol nebs this shift in addition to MDI. Oxygen increased to 4 lpm for patient comfort. He reports being more short of breath today than yesterday. Occasional loose cough. R  Continue to follow until discharge; monitor oxygen saturation and provide nebulizer treatments as needed.

## 2017-02-03 NOTE — PLAN OF CARE
Problem: Goal Outcome Summary  Goal: Goal Outcome Summary  Pt states that he feels more SOA today, with audible wheezing and congestion, even without much activity. He requested the O2 to be increased to 4 L NC, and states that he does feel somewhat better after that.  Sat level is 94%, , BP remains about the same as previously-159/88 and 152/88. He denies pain, is up indep, adequate I and O.

## 2017-02-03 NOTE — PLAN OF CARE
Problem: Goal Outcome Summary  Goal: Goal Outcome Summary  Outcome: Improving  Patient reports he is feeling better today. He does have a expiratory wheeze and gets SOB sometimes with activity. He ambulated halls today on RA and O2 sats where 91-93%. Patient still very congested and has a lot of nasal drainage. VSS, except /97 and 163/102, patient denies pain. Patient up independently, eating and drinking well.

## 2017-02-04 ENCOUNTER — APPOINTMENT (OUTPATIENT)
Dept: GENERAL RADIOLOGY | Facility: CLINIC | Age: 52
DRG: 176 | End: 2017-02-04
Attending: FAMILY MEDICINE
Payer: COMMERCIAL

## 2017-02-04 LAB — INR PPP: 2.46 (ref 0.86–1.14)

## 2017-02-04 PROCEDURE — 25000132 ZZH RX MED GY IP 250 OP 250 PS 637: Performed by: PEDIATRICS

## 2017-02-04 PROCEDURE — 25000132 ZZH RX MED GY IP 250 OP 250 PS 637: Performed by: FAMILY MEDICINE

## 2017-02-04 PROCEDURE — 99232 SBSQ HOSP IP/OBS MODERATE 35: CPT | Performed by: FAMILY MEDICINE

## 2017-02-04 PROCEDURE — 94640 AIRWAY INHALATION TREATMENT: CPT

## 2017-02-04 PROCEDURE — 25000128 H RX IP 250 OP 636: Performed by: FAMILY MEDICINE

## 2017-02-04 PROCEDURE — 40000917 ZZH STATISTIC PEAK FLOW MEASUREMENT

## 2017-02-04 PROCEDURE — 25000125 ZZHC RX 250: Performed by: FAMILY MEDICINE

## 2017-02-04 PROCEDURE — 40000270 ZZH STATISTIC OXYGEN  O2DAILY TECH TIME

## 2017-02-04 PROCEDURE — 85610 PROTHROMBIN TIME: CPT | Performed by: PEDIATRICS

## 2017-02-04 PROCEDURE — 71010 XR CHEST PORT 1 VW: CPT | Mod: TC

## 2017-02-04 PROCEDURE — 12000000 ZZH R&B MED SURG/OB

## 2017-02-04 PROCEDURE — 36415 COLL VENOUS BLD VENIPUNCTURE: CPT | Performed by: PEDIATRICS

## 2017-02-04 PROCEDURE — 25000125 ZZHC RX 250: Performed by: PEDIATRICS

## 2017-02-04 PROCEDURE — 94640 AIRWAY INHALATION TREATMENT: CPT | Mod: 76

## 2017-02-04 RX ORDER — WARFARIN SODIUM 2.5 MG/1
2.5 TABLET ORAL
Status: COMPLETED | OUTPATIENT
Start: 2017-02-04 | End: 2017-02-04

## 2017-02-04 RX ORDER — GUAIFENESIN/DEXTROMETHORPHAN 100-10MG/5
5-10 SYRUP ORAL EVERY 4 HOURS PRN
Status: DISCONTINUED | OUTPATIENT
Start: 2017-02-04 | End: 2017-02-07 | Stop reason: HOSPADM

## 2017-02-04 RX ORDER — LISINOPRIL 10 MG/1
10 TABLET ORAL DAILY
Status: DISCONTINUED | OUTPATIENT
Start: 2017-02-04 | End: 2017-02-07 | Stop reason: HOSPADM

## 2017-02-04 RX ORDER — METHYLPREDNISOLONE SODIUM SUCCINATE 125 MG/2ML
60 INJECTION, POWDER, LYOPHILIZED, FOR SOLUTION INTRAMUSCULAR; INTRAVENOUS EVERY 8 HOURS
Status: DISCONTINUED | OUTPATIENT
Start: 2017-02-04 | End: 2017-02-06

## 2017-02-04 RX ADMIN — METHYLPREDNISOLONE SODIUM SUCCINATE 62.5 MG: 125 INJECTION, POWDER, FOR SOLUTION INTRAMUSCULAR; INTRAVENOUS at 16:32

## 2017-02-04 RX ADMIN — WARFARIN SODIUM 2.5 MG: 2.5 TABLET ORAL at 17:39

## 2017-02-04 RX ADMIN — IPRATROPIUM BROMIDE AND ALBUTEROL SULFATE 3 ML: .5; 3 SOLUTION RESPIRATORY (INHALATION) at 23:45

## 2017-02-04 RX ADMIN — IPRATROPIUM BROMIDE AND ALBUTEROL SULFATE 3 ML: .5; 3 SOLUTION RESPIRATORY (INHALATION) at 16:21

## 2017-02-04 RX ADMIN — AMOXICILLIN AND CLAVULANATE POTASSIUM 1 TABLET: 500; 125 TABLET, FILM COATED ORAL at 21:04

## 2017-02-04 RX ADMIN — AMOXICILLIN AND CLAVULANATE POTASSIUM 1 TABLET: 500; 125 TABLET, FILM COATED ORAL at 04:48

## 2017-02-04 RX ADMIN — OXYMETAZOLINE HYDROCHLORIDE 2 SPRAY: 5 SPRAY NASAL at 10:56

## 2017-02-04 RX ADMIN — METHYLPREDNISOLONE SODIUM SUCCINATE 62.5 MG: 125 INJECTION, POWDER, FOR SOLUTION INTRAMUSCULAR; INTRAVENOUS at 23:45

## 2017-02-04 RX ADMIN — IPRATROPIUM BROMIDE AND ALBUTEROL SULFATE 3 ML: .5; 3 SOLUTION RESPIRATORY (INHALATION) at 12:11

## 2017-02-04 RX ADMIN — PREDNISONE 40 MG: 20 TABLET ORAL at 10:56

## 2017-02-04 RX ADMIN — IPRATROPIUM BROMIDE AND ALBUTEROL SULFATE 3 ML: .5; 3 SOLUTION RESPIRATORY (INHALATION) at 04:47

## 2017-02-04 RX ADMIN — LISINOPRIL 10 MG: 10 TABLET ORAL at 10:57

## 2017-02-04 RX ADMIN — ENOXAPARIN SODIUM 120 MG: 120 INJECTION SUBCUTANEOUS at 14:25

## 2017-02-04 RX ADMIN — GUAIFENESIN AND DEXTROMETHORPHAN 10 ML: 100; 10 SYRUP ORAL at 23:52

## 2017-02-04 RX ADMIN — IPRATROPIUM BROMIDE AND ALBUTEROL SULFATE 3 ML: .5; 3 SOLUTION RESPIRATORY (INHALATION) at 19:44

## 2017-02-04 RX ADMIN — ENOXAPARIN SODIUM 120 MG: 120 INJECTION SUBCUTANEOUS at 23:52

## 2017-02-04 RX ADMIN — IPRATROPIUM BROMIDE AND ALBUTEROL SULFATE 3 ML: .5; 3 SOLUTION RESPIRATORY (INHALATION) at 08:26

## 2017-02-04 RX ADMIN — AMOXICILLIN AND CLAVULANATE POTASSIUM 1 TABLET: 500; 125 TABLET, FILM COATED ORAL at 14:25

## 2017-02-04 RX ADMIN — GUAIFENESIN AND DEXTROMETHORPHAN 10 ML: 100; 10 SYRUP ORAL at 19:43

## 2017-02-04 RX ADMIN — ENOXAPARIN SODIUM 120 MG: 120 INJECTION SUBCUTANEOUS at 01:23

## 2017-02-04 RX ADMIN — IPRATROPIUM BROMIDE AND ALBUTEROL SULFATE 3 ML: .5; 3 SOLUTION RESPIRATORY (INHALATION) at 01:23

## 2017-02-04 NOTE — PLAN OF CARE
Problem: Goal Outcome Summary  Goal: Goal Outcome Summary  Outcome: No Change  Patients sats remain above 93% on room air. Patient states he is feeling much better since the Duonebs have been ordered and administered. Patients lungs exp wheezes and coarse. Patient Denies pain. BP have been elevated tonight. Patient is up independent in the room. Steady gait; alert and oriented x4. Ambulating in the room.

## 2017-02-04 NOTE — PLAN OF CARE
Problem: Goal Outcome Summary  Goal: Goal Outcome Summary  Outcome: No Change  Up indep, very SOA at rest and increased greatly with activity-Duonebs and Afrin are helpful, denies pain, Sats remain at 94-98% on RA, adequate I and O.

## 2017-02-04 NOTE — PROGRESS NOTES
[facility] Progress Note          Assessment and Plan:   Assessment:   Principal Problem:    Acute respiratory distress with hypoxia without respiratory failure    Assessment:  Thought to be secondary to pulmonary emboli, asthma, and underlying acute sinusitis. Patient did have improvement of his respiratory status throughout yesterday afternoon and overnight and even until this morning, however did have acute worsening of respiratory status following a shower with O2 saturations decreasing to 81% on room air and patient having significant shortness of breath. Patient is now requiring 3-4 L oxygen mask for comfort, although does maintain oxygen saturations at 88% to 92% on room air does have increasing tachycardia and tachypnea    Plan:  We'll continue with Lovenox and Coumadin, transition from p.o. steroids IV Solu-Medrol and continue with DuoNeb scheduled as well as p.r.n. albuterol nebs. We'll perform chest x-ray to ensure no developing pneumonia. Continue to supplement with O2 as needed. Anticipate ongoing hospitalization for 1-3 days as patient clinically stabilizes and is safe to discharge home either with or without oxygen.      Acute pulmonary embolism (H)    Assessment:   Present at time of admission, patient has been on Lovenox and Coumadin, with INR therapeutic for the first day this morning.    Plan:  Continue with Lovenox and Coumadin, recheck INR tomorrow and would consider discontinuing Lovenox tomorrow if INR remains therapeutic. Continue with supplementation and plan as above.    Active Problems:    Moderate persistent asthma with acute exacerbation    Assessment:  Ongoing and significant with patient having decreased air movement this afternoon    Plan:  Transition to IV Solu-Medrol, continue scheduled DuoNeb and p.r.n. albuterol nebs. Proceed with chest x-ray as above and supplement with oxygen as needed.      Hypertension    Assessment:  Blood pressures continue to be elevated in the  "140-160 range despite lisinopril 5 mg daily initiated 2 days ago    Plan:  We'll increase lisinopril to 10 mg daily and monitor blood pressures closely for ongoing slow improvement.      Acute sinusitis    Assessment:  Starting to improve on Augmentin b.i.d. dosing. Today is day 6 of therapy. Continue with Augmentin until course completion as well as nasal spray    Plan:        Obesity    Assessment:  chronic and stable    Plan:  no acute intervention needed      History of thyroid cancer    Assessment:  previous history without known recurrence    Plan:  ongoing outpatient monitoring as needed         VTE:  Lovenox, Coumadin  Code Status:  Full Code        Interval History:   Continues to improve overall compared to yesterday however after a shower today desating to 81% and took a long time to recover.  Continues to need 3-4L oxygen support to keep sats above 90% and patient feeling more winded.  Blood pressure continues to be elevated in the 140-150 range.  Eating and voiding well.  Tolerating medications without significant side effects.            Significant Problems:     Past Medical History   Diagnosis Date     NO ACTIVE PROBLEMS      Asthma      as a child             Physical Exam:   Blood pressure 142/88, pulse 87, temperature 96.4  F (35.8  C), temperature source Oral, resp. rate 20, height 1.93 m (6' 4\"), weight 133.5 kg (294 lb 5 oz), SpO2 92 %.  Constitutional:   awake, alert, cooperative, no apparent distress at rest and with oxygen, and appears stated age     Lungs:   No increased work of breathing, decreased air exchange - worse than yesterday, mild end expiratory wheezing today - improved from yesterday     Cardiovascular:   Normal apical impulse, regular rate and rhythm in the upper 90s, normal S1 and S2, no S3 or S4, and no murmur noted     Abdomen:   Bowel sounds present, abdomen soft and non-tender     Musculoskeletal:   no lower extremity pitting edema present     Neurologic:   Awake, alert, " oriented to name, place and time.       Skin:   normal skin color, texture, turgor             Data:   All laboratory data reviewed    Attestation:  I have reviewed today's vital signs, notes, medications, labs and imaging.     Electronically Signed:  Vivian Cohn MD    Note: Chart documentation done in part with Dragon Voice Recognition software. Although reviewed after completion, some word and grammatical errors may remain.

## 2017-02-04 NOTE — PHARMACY-ANTICOAGULATION SERVICE
Clinical Pharmacy - Warfarin Dosing Consult     Pharmacy has been consulted to manage this patient s warfarin therapy.  Indication: DVT/ PE Treatment  Therapy Goal: INR 2-3  Warfarin Prior to Admission: No  Recent documented change in oral intake/nutrition: Unknown    INR   Date Value Ref Range Status   02/04/2017 2.46* 0.86 - 1.14 Final   02/03/2017 1.67* 0.86 - 1.14 Final     Note rapid increase in INR after 2 doses of 7.5 mg and one dose of 5 mg of warfarin respectively.  INR is in therapeutic target, but will reduce dose today to avoid overshooting target range.  Estimating patient may need doses in range of 2.5-5 mg daily.  Will continue to assess.    Recommend warfarin 2.5 mg today.  Pharmacy will monitor Parker Hunt daily and order warfarin doses to achieve specified goal.      Please contact pharmacy as soon as possible if the warfarin needs to be held for a procedure or if the warfarin goals change.    Noel Kidd, PharmD

## 2017-02-04 NOTE — PROGRESS NOTES
[facility]    Sepsis Evaluation Progress Note    Date of Service: 02/03/2017    I was called to see Parker Hunt due to abnormal vital signs triggering the Sepsis SIRS screening alert. He is not known to have an infection.     Physical Exam    Vital Signs:  Temp: 97.2  F (36.2  C) Temp src: Oral BP: (!) 157/96 mmHg Pulse: 108 Heart Rate: 101 Resp: 23 SpO2: 94 % O2 Device: None (Room air) Oxygen Delivery: 4 LPM    Lab:  LACTIC ACID   Date Value Ref Range Status   02/03/2017 2.5* 0.7 - 2.1 mmol/L Final     Comment:     Significant value called to and read back by  ROSALINA NOEL AT 1956 SC         The patient is at baseline mental status.    The rest of their physical exam is significant for asthma with recent hx of PE.    Assessment and Plan    The SIRS and exam findings are likely due to asthma with wheezing, taking albuterol nebs, there is no sign of sepsis at this time.    Disposition: The patient has an underlying condition of asthma that will continue to affect their vital signs and should not have further labs drawn to assess sepsis unless their clinical appearance changes.    Hayden Bland MD, MD

## 2017-02-04 NOTE — PROGRESS NOTES
DATE:  2/3/2017   TIME OF RECEIPT FROM LAB:  1957  LAB TEST:  Lactic Acid  LAB VALUE:  2.5  RESULTS GIVEN WITH READ-BACK TO (PROVIDER):  Dr. Bland  TIME LAB VALUE REPORTED TO PROVIDER:   Betzy

## 2017-02-04 NOTE — PLAN OF CARE
Problem: Goal Outcome Summary  Goal: Goal Outcome Summary  Outcome: Improving  VSS,afebrile, pulse not tachy tonight-76.  Has a frequent cough- productive.  Lung sounds have expiratory wheezes.  Denies pain at this time.  On R/A sats 93-94%.   Possible discharge today.

## 2017-02-05 LAB
ANION GAP SERPL CALCULATED.3IONS-SCNC: 12 MMOL/L (ref 3–14)
BACTERIA SPEC CULT: ABNORMAL
BUN SERPL-MCNC: 17 MG/DL (ref 7–30)
CALCIUM SERPL-MCNC: 8.8 MG/DL (ref 8.5–10.1)
CHLORIDE SERPL-SCNC: 104 MMOL/L (ref 94–109)
CO2 SERPL-SCNC: 22 MMOL/L (ref 20–32)
CREAT SERPL-MCNC: 1.02 MG/DL (ref 0.66–1.25)
GFR SERPL CREATININE-BSD FRML MDRD: 76 ML/MIN/1.7M2
GLUCOSE SERPL-MCNC: 155 MG/DL (ref 70–99)
INR PPP: 2.69 (ref 0.86–1.14)
LACTATE BLD-SCNC: 4.7 MMOL/L (ref 0.7–2.1)
MICRO REPORT STATUS: ABNORMAL
POTASSIUM SERPL-SCNC: 4.4 MMOL/L (ref 3.4–5.3)
SODIUM SERPL-SCNC: 138 MMOL/L (ref 133–144)
SPECIMEN SOURCE: ABNORMAL

## 2017-02-05 PROCEDURE — 12000007 ZZH R&B INTERMEDIATE

## 2017-02-05 PROCEDURE — 36415 COLL VENOUS BLD VENIPUNCTURE: CPT | Performed by: FAMILY MEDICINE

## 2017-02-05 PROCEDURE — 25000308 HC RX OP HPI UCR WEL MED 250 IP 250: Performed by: PEDIATRICS

## 2017-02-05 PROCEDURE — 94640 AIRWAY INHALATION TREATMENT: CPT

## 2017-02-05 PROCEDURE — 99232 SBSQ HOSP IP/OBS MODERATE 35: CPT | Performed by: FAMILY MEDICINE

## 2017-02-05 PROCEDURE — 25000132 ZZH RX MED GY IP 250 OP 250 PS 637: Performed by: FAMILY MEDICINE

## 2017-02-05 PROCEDURE — 83605 ASSAY OF LACTIC ACID: CPT | Performed by: FAMILY MEDICINE

## 2017-02-05 PROCEDURE — 94645 CONT INHLJ TX EACH ADDL HOUR: CPT

## 2017-02-05 PROCEDURE — 94644 CONT INHLJ TX 1ST HOUR: CPT

## 2017-02-05 PROCEDURE — 25000125 ZZHC RX 250: Performed by: FAMILY MEDICINE

## 2017-02-05 PROCEDURE — 87205 SMEAR GRAM STAIN: CPT | Performed by: PEDIATRICS

## 2017-02-05 PROCEDURE — 25000308 HC RX OP HPI UCR WEL MED 250 IP 250: Performed by: FAMILY MEDICINE

## 2017-02-05 PROCEDURE — 94640 AIRWAY INHALATION TREATMENT: CPT | Mod: 76

## 2017-02-05 PROCEDURE — 80048 BASIC METABOLIC PNL TOTAL CA: CPT | Performed by: PEDIATRICS

## 2017-02-05 PROCEDURE — 85610 PROTHROMBIN TIME: CPT | Performed by: PEDIATRICS

## 2017-02-05 PROCEDURE — 25000128 H RX IP 250 OP 636: Performed by: FAMILY MEDICINE

## 2017-02-05 PROCEDURE — 36415 COLL VENOUS BLD VENIPUNCTURE: CPT | Performed by: PEDIATRICS

## 2017-02-05 RX ORDER — ALBUTEROL SULFATE 5 MG/ML
15 SOLUTION, NON-ORAL INHALATION CONTINUOUS
Status: DISPENSED | OUTPATIENT
Start: 2017-02-05 | End: 2017-02-05

## 2017-02-05 RX ORDER — WARFARIN SODIUM 5 MG/1
5 TABLET ORAL
Status: COMPLETED | OUTPATIENT
Start: 2017-02-05 | End: 2017-02-05

## 2017-02-05 RX ORDER — ALBUTEROL SULFATE 0.83 MG/ML
2.5 SOLUTION RESPIRATORY (INHALATION) EVERY 4 HOURS
Status: DISCONTINUED | OUTPATIENT
Start: 2017-02-05 | End: 2017-02-05

## 2017-02-05 RX ORDER — LEVALBUTEROL INHALATION SOLUTION 1.25 MG/3ML
1.25 SOLUTION RESPIRATORY (INHALATION) EVERY 4 HOURS
Status: DISCONTINUED | OUTPATIENT
Start: 2017-02-05 | End: 2017-02-06

## 2017-02-05 RX ADMIN — AMOXICILLIN AND CLAVULANATE POTASSIUM 1 TABLET: 500; 125 TABLET, FILM COATED ORAL at 06:26

## 2017-02-05 RX ADMIN — METHYLPREDNISOLONE SODIUM SUCCINATE 62.5 MG: 125 INJECTION, POWDER, FOR SOLUTION INTRAMUSCULAR; INTRAVENOUS at 15:28

## 2017-02-05 RX ADMIN — ALBUTEROL SULFATE 15 MG/HR: 5 SOLUTION RESPIRATORY (INHALATION) at 13:28

## 2017-02-05 RX ADMIN — METHYLPREDNISOLONE SODIUM SUCCINATE 62.5 MG: 125 INJECTION, POWDER, FOR SOLUTION INTRAMUSCULAR; INTRAVENOUS at 23:40

## 2017-02-05 RX ADMIN — GUAIFENESIN AND DEXTROMETHORPHAN 10 ML: 100; 10 SYRUP ORAL at 04:05

## 2017-02-05 RX ADMIN — GUAIFENESIN AND DEXTROMETHORPHAN 10 ML: 100; 10 SYRUP ORAL at 07:59

## 2017-02-05 RX ADMIN — IPRATROPIUM BROMIDE AND ALBUTEROL SULFATE 3 ML: .5; 3 SOLUTION RESPIRATORY (INHALATION) at 23:41

## 2017-02-05 RX ADMIN — WARFARIN SODIUM 5 MG: 5 TABLET ORAL at 18:13

## 2017-02-05 RX ADMIN — IPRATROPIUM BROMIDE AND ALBUTEROL SULFATE 3 ML: .5; 3 SOLUTION RESPIRATORY (INHALATION) at 11:45

## 2017-02-05 RX ADMIN — LISINOPRIL 10 MG: 10 TABLET ORAL at 08:00

## 2017-02-05 RX ADMIN — AMOXICILLIN AND CLAVULANATE POTASSIUM 1 TABLET: 500; 125 TABLET, FILM COATED ORAL at 14:34

## 2017-02-05 RX ADMIN — IPRATROPIUM BROMIDE AND ALBUTEROL SULFATE 3 ML: .5; 3 SOLUTION RESPIRATORY (INHALATION) at 08:47

## 2017-02-05 RX ADMIN — ALBUTEROL SULFATE 2.5 MG: 2.5 SOLUTION RESPIRATORY (INHALATION) at 11:51

## 2017-02-05 RX ADMIN — ALBUTEROL SULFATE 2.5 MG: 2.5 SOLUTION RESPIRATORY (INHALATION) at 18:13

## 2017-02-05 RX ADMIN — IPRATROPIUM BROMIDE AND ALBUTEROL SULFATE 3 ML: .5; 3 SOLUTION RESPIRATORY (INHALATION) at 04:05

## 2017-02-05 RX ADMIN — LEVALBUTEROL INHALATION 1.25MG/3ML 1.25 MG: 1.25 SOLUTION RESPIRATORY (INHALATION) at 21:56

## 2017-02-05 RX ADMIN — METHYLPREDNISOLONE SODIUM SUCCINATE 62.5 MG: 125 INJECTION, POWDER, FOR SOLUTION INTRAMUSCULAR; INTRAVENOUS at 08:00

## 2017-02-05 RX ADMIN — AMOXICILLIN AND CLAVULANATE POTASSIUM 1 TABLET: 500; 125 TABLET, FILM COATED ORAL at 21:56

## 2017-02-05 RX ADMIN — IPRATROPIUM BROMIDE AND ALBUTEROL SULFATE 3 ML: .5; 3 SOLUTION RESPIRATORY (INHALATION) at 19:58

## 2017-02-05 NOTE — PLAN OF CARE
Problem: Goal Outcome Summary  Goal: Goal Outcome Summary  Outcome: Improving  VSS, up indep, on RA T/O shift, denies pain, states the congestion is much better, is currently receiving continuous neb treatment for 2 hours, PO ABX, plans to D/C to home tomorrow.

## 2017-02-05 NOTE — PLAN OF CARE
Problem: Goal Outcome Summary  Goal: Goal Outcome Summary  S-(situation): end of shift summary    B-(background): PE    A-(assessment): Pt has been on the oxymask all night on 2L, has done the neb treatments on room air and was able to maintain SATs in the low 90s.  Has had a few coughing spells during the night, robitussin given every 4 hours with neb treatments.  Lung sound diminished throughout.  Systolic blood pressure has gone down from the 160s to the 130s over night.      R-(recommendations): Continue to monitor respiratory status and wean O2 as able.

## 2017-02-05 NOTE — PHARMACY-ANTICOAGULATION SERVICE
Clinical Pharmacy - Warfarin Dosing Consult     Pharmacy has been consulted to manage this patient s warfarin therapy.  Indication: DVT/ PE Treatment  Therapy Goal: INR 2-3  Warfarin Prior to Admission: No  Recent documented change in oral intake/nutrition: Unknown    INR   Date Value Ref Range Status   02/05/2017 2.69* 0.86 - 1.14 Final   02/04/2017 2.46* 0.86 - 1.14 Final       Recommend warfarin 5 mg today.  Pharmacy will monitor Parker JAMES Conwayon daily and order warfarin doses to achieve specified goal.      Please contact pharmacy as soon as possible if the warfarin needs to be held for a procedure or if the warfarin goals change.      Noel Kidd, PharmD

## 2017-02-05 NOTE — PROGRESS NOTES
[facility] Progress Note          Assessment and Plan:   Assessment:   Principal Problem:    Acute pulmonary embolism (H)    Assessment: INR has been therapeutic ×2 days, patient having ongoing respiratory symptoms with O2 supplementation needs    Plan: We'll discontinue Lovenox as Coumadin is therapeutic for the second day in a row. Continue to monitor INR daily and provide O2 supplementation as needed. Discussed with patient that we could consider discharging him home with oxygen as soon as he remains stable for 24 hours from a respiratory standpoint.    Active Problems:    Moderate persistent asthma with acute exacerbation    Assessment: Patient continues to have very minimal air movement but does have decreased wheezing today compared to yesterday. Continues to need 2 L nasal cannula oxygen to maintain saturations    Plan:  Continue with IV steroids and proceed with continuous nebulization ×2 hours followed by alternating albuterol and DuoNeb nebs every 2 hours overnight. Continue to supplement with oxygen as needed.      Acute respiratory distress with hypoxia    Assessment: secondary to PE and asthma exacerbation as above, with patient continuing to have fluctuating oxygen needs as well as ongoing significant tightness and minimal air movement    Plan:  We'll proceed with continuous neb followed by do not's and albuterol nebs alternating every 2 hours. Continue with O2 supplementation as needed as well as IV steroids and ongoing treatment of PE with Coumadin. No antibiotics other than treatment for sinusitis. At this time      Hypertension    Assessment:  Patient's blood pressures are responding well to increase lisinopril dosing    Plan:  Continue lisinopril dosing without change      Acute sinusitis    Assessment:  Symptoms significantly improving with Augmentin course    Plan:  Continue Augmentin without change      Obesity    Assessment:  Chronic and stable    Plan:  No acute intervention needed      " History of thyroid cancer    Assessment:  No known recurrence    Plan:  Patient will need outpatient follow-up as previously recommended         VTE:  Coumadin  Code Status:  Full code        Interval History:   About the same today.  Vitals signs stable but patient continues to need fluctuating levels of oxygen.  Received continuous neb for 2 hours this afternoon and feels slightly improved, still on 2 L NC with oxygen in the low 90s.  Tolerating medications and treatment plan without significant side effects or problems.  Eating and voiding well.  No new concerns today.            Significant Problems:     Past Medical History   Diagnosis Date     NO ACTIVE PROBLEMS      Asthma      as a child             Physical Exam:   Blood pressure 128/78, pulse 111, temperature 96.8  F (36  C), temperature source Oral, resp. rate 20, height 1.93 m (6' 4\"), weight 133.5 kg (294 lb 5 oz), SpO2 92 %.  Constitutional:   awake, alert, cooperative, no apparent distress, and appears stated age     Lungs:   No increased work of breathing, ongoing decreased air exchange, clear to auscultation bilaterally, no crackles or wheezing which is improved from yesterday     Cardiovascular:   Sinus tachycardia in the 120s currently     Abdomen:   Bowel sounds present, abdomen soft and non-tender     Musculoskeletal:   no lower extremity pitting edema present     Neurologic:   Awake, alert, oriented to name, place and time.       Skin:   normal skin color, texture, turgor             Data:   All laboratory and imaging data in the past 24 hours reviewed    Attestation:  I have reviewed today's vital signs, notes, medications, labs and imaging.     Electronically Signed:  Vivian Cohn MD    Note: Chart documentation done in part with Dragon Voice Recognition software. Although reviewed after completion, some word and grammatical errors may remain.     "

## 2017-02-05 NOTE — PROVIDER NOTIFICATION
MD notified of /107 and then 142/98 after recheck. No new orders obtained at this time. Will continue to monitor.

## 2017-02-05 NOTE — PLAN OF CARE
Problem: Goal Outcome Summary  Goal: Goal Outcome Summary  Outcome: Improving  VSS, afebrile. Pt up independently in room and halls. Denies pain. Pt on 2L via oxymask with sats maintained above 92%. On exertion sats drop below 88%. Pt had high blood pressure (see flowsheet and previous note) MD notified. Pt flagged for lactic on this shift, lab work not ordered due to previous MD note stating only check lab work for sepsis if clinical symptoms are present, no symptoms present at this time. Pt received PRN robitussin x1 during this shift for persistent dry cough with some relief noted.

## 2017-02-06 LAB
GRAM STN SPEC: ABNORMAL
INR PPP: 2.58 (ref 0.86–1.14)
MICRO REPORT STATUS: ABNORMAL
SPECIMEN SOURCE: ABNORMAL

## 2017-02-06 PROCEDURE — 12000007 ZZH R&B INTERMEDIATE

## 2017-02-06 PROCEDURE — 25000128 H RX IP 250 OP 636: Performed by: FAMILY MEDICINE

## 2017-02-06 PROCEDURE — 94640 AIRWAY INHALATION TREATMENT: CPT | Mod: 76

## 2017-02-06 PROCEDURE — 40000270 ZZH STATISTIC OXYGEN  O2DAILY TECH TIME

## 2017-02-06 PROCEDURE — 25000132 ZZH RX MED GY IP 250 OP 250 PS 637: Performed by: FAMILY MEDICINE

## 2017-02-06 PROCEDURE — 85610 PROTHROMBIN TIME: CPT | Performed by: PEDIATRICS

## 2017-02-06 PROCEDURE — 36415 COLL VENOUS BLD VENIPUNCTURE: CPT | Performed by: PEDIATRICS

## 2017-02-06 PROCEDURE — 25000125 ZZHC RX 250: Performed by: FAMILY MEDICINE

## 2017-02-06 PROCEDURE — 94640 AIRWAY INHALATION TREATMENT: CPT

## 2017-02-06 PROCEDURE — 99232 SBSQ HOSP IP/OBS MODERATE 35: CPT | Performed by: FAMILY MEDICINE

## 2017-02-06 RX ORDER — WARFARIN SODIUM 5 MG/1
5 TABLET ORAL
Status: COMPLETED | OUTPATIENT
Start: 2017-02-06 | End: 2017-02-06

## 2017-02-06 RX ORDER — PREDNISONE 20 MG/1
40 TABLET ORAL 2 TIMES DAILY
Status: DISCONTINUED | OUTPATIENT
Start: 2017-02-06 | End: 2017-02-07 | Stop reason: HOSPADM

## 2017-02-06 RX ORDER — LEVALBUTEROL INHALATION SOLUTION 1.25 MG/3ML
1.25 SOLUTION RESPIRATORY (INHALATION) EVERY 4 HOURS PRN
Status: DISCONTINUED | OUTPATIENT
Start: 2017-02-06 | End: 2017-02-06

## 2017-02-06 RX ORDER — IPRATROPIUM BROMIDE AND ALBUTEROL SULFATE 2.5; .5 MG/3ML; MG/3ML
3 SOLUTION RESPIRATORY (INHALATION) EVERY 4 HOURS PRN
Status: DISCONTINUED | OUTPATIENT
Start: 2017-02-06 | End: 2017-02-07 | Stop reason: HOSPADM

## 2017-02-06 RX ORDER — LEVALBUTEROL INHALATION SOLUTION 1.25 MG/3ML
1.25 SOLUTION RESPIRATORY (INHALATION) EVERY 4 HOURS
Status: DISCONTINUED | OUTPATIENT
Start: 2017-02-06 | End: 2017-02-07 | Stop reason: HOSPADM

## 2017-02-06 RX ADMIN — LEVALBUTEROL INHALATION 1.25MG/3ML 1.25 MG: 1.25 SOLUTION RESPIRATORY (INHALATION) at 06:08

## 2017-02-06 RX ADMIN — WARFARIN SODIUM 5 MG: 5 TABLET ORAL at 17:55

## 2017-02-06 RX ADMIN — LEVALBUTEROL INHALATION 1.25MG/3ML 1.25 MG: 1.25 SOLUTION RESPIRATORY (INHALATION) at 22:11

## 2017-02-06 RX ADMIN — AMOXICILLIN AND CLAVULANATE POTASSIUM 1 TABLET: 500; 125 TABLET, FILM COATED ORAL at 22:11

## 2017-02-06 RX ADMIN — METHYLPREDNISOLONE SODIUM SUCCINATE 62.5 MG: 125 INJECTION, POWDER, FOR SOLUTION INTRAMUSCULAR; INTRAVENOUS at 08:29

## 2017-02-06 RX ADMIN — IPRATROPIUM BROMIDE AND ALBUTEROL SULFATE 3 ML: .5; 3 SOLUTION RESPIRATORY (INHALATION) at 07:44

## 2017-02-06 RX ADMIN — IPRATROPIUM BROMIDE AND ALBUTEROL SULFATE 3 ML: .5; 3 SOLUTION RESPIRATORY (INHALATION) at 03:07

## 2017-02-06 RX ADMIN — LEVALBUTEROL INHALATION 1.25MG/3ML 1.25 MG: 1.25 SOLUTION RESPIRATORY (INHALATION) at 10:19

## 2017-02-06 RX ADMIN — LEVALBUTEROL INHALATION 1.25MG/3ML 1.25 MG: 1.25 SOLUTION RESPIRATORY (INHALATION) at 17:55

## 2017-02-06 RX ADMIN — LEVALBUTEROL INHALATION 1.25MG/3ML 1.25 MG: 1.25 SOLUTION RESPIRATORY (INHALATION) at 02:48

## 2017-02-06 RX ADMIN — PREDNISONE 40 MG: 20 TABLET ORAL at 20:16

## 2017-02-06 RX ADMIN — AMOXICILLIN AND CLAVULANATE POTASSIUM 1 TABLET: 500; 125 TABLET, FILM COATED ORAL at 06:07

## 2017-02-06 RX ADMIN — LISINOPRIL 10 MG: 10 TABLET ORAL at 08:29

## 2017-02-06 RX ADMIN — LEVALBUTEROL INHALATION 1.25MG/3ML 1.25 MG: 1.25 SOLUTION RESPIRATORY (INHALATION) at 13:57

## 2017-02-06 RX ADMIN — AMOXICILLIN AND CLAVULANATE POTASSIUM 1 TABLET: 500; 125 TABLET, FILM COATED ORAL at 13:37

## 2017-02-06 NOTE — PROGRESS NOTES
[facility] Progress Note          Assessment and Plan:   Assessment:   Principal Problem:    Acute pulmonary embolism (H)    Assessment: present at time of presentation, Coumadin therapeutic, oxygen needs stabilizing over the past 24 hours.    Plan: Continue with Coumadin and O2 supplementation.  Possible discharge home tomorrow with ongoing O2 supplementation and close clinical follow up.    Active Problems:    Moderate persistent asthma with acute exacerbation    Assessment: patient has ongoing diminished air movement however is improved slightly from yesterday and no wheezing, stable on 2L NC for the past 24 hours.      Plan: Transition to PO steroids - prednisone 40 mg BID, and change DuoNebs to prn and continue with scheduled Xopenex nebs.  Possible discharge home with ongoing home oxygen tomorrow if patient continues to remain stable.       Acute respiratory distress with hypoxia     Assessment: secondary to PE and asthma as above, stabilizing in the past 24 hours as above    Plan: continue with treatment as above      Hypertension    Assessment: blood pressure much improved with increased lisinopril dosing    Plan: continue lisinopril 10 mg daily and monitor      Acute sinusitis    Assessment: continues to improve - patient on day 8 of antibiotic therapy    Plan: continue for 10 day course completion      Obesity    Assessment: chronic and stable    Plan: no acute intervention needed      History of thyroid cancer    Assessment: no known reoccurrence     Plan: outpatient follow up as previously recommended.          VTE:  Coumadin   Code Status:  Full Code        Interval History:   Continues to improve slowly and patient has been respiratory stable without need for O2 supplementation alteration for the past 24 hours for the first time in his hospitalization.  Vital signs generally better - heart rate significantly reduced after transition to Xoponex nebs every 4 hours alone.  Patient did have several  "second vasovagal episode following prolonged coughing spell that was not associated with hypoxia.  Eating and voiding well.  Tolerating medications without significant side effects.  No new concerns today.            Significant Problems:     Past Medical History   Diagnosis Date     NO ACTIVE PROBLEMS      Asthma      as a child             Physical Exam:   Blood pressure 141/80, pulse 122, temperature 98.2  F (36.8  C), temperature source Oral, resp. rate 20, height 1.93 m (6' 4\"), weight 133.5 kg (294 lb 5 oz), SpO2 92 %.  Constitutional:   awake, alert, cooperative, no apparent distress, and appears stated age     Lungs:   No increased work of breathing, decreased air exchange but has increased slightly from previous, clear to auscultation bilaterally, no crackles or wheezing     Cardiovascular:   Sinus tachycardia ongoing however currently is only in the 100 to low 110s which is much improved from previous     Abdomen:   Bowel sounds present, abdomen soft and non-tender     Musculoskeletal:   no lower extremity pitting edema present     Neurologic:   Awake, alert, oriented to name, place and time.       Skin:   normal skin color, texture, turgor             Data:   All laboratory data reviewed    Attestation:  I have reviewed today's vital signs, notes, medications, labs and imaging.     Electronically Signed:  Vivian Cohn MD    Note: Chart documentation done in part with Dragon Voice Recognition software. Although reviewed after completion, some word and grammatical errors may remain.       "

## 2017-02-06 NOTE — PROGRESS NOTES
[facility]    Sepsis Evaluation Progress Note    Date of Service: 02/05/2017    I was called to see Parker Hunt due to abnormal vital signs triggering the Sepsis SIRS screening alert. He is not known to have an infection.     Physical Exam    Vital Signs:  Temp: 96.8  F (36  C) Temp src: Oral BP: 106/74 mmHg Pulse: 111 Heart Rate: 138 Resp: 20 SpO2: 93 % O2 Device: None (Room air) Oxygen Delivery: 10 LPM    Lab:  LACTIC ACID   Date Value Ref Range Status   02/05/2017 4.7* 0.7 - 2.1 mmol/L Final     Comment:     Critical Value called to and read back by  APRIL MALLORY MP,2015         The patient is at baseline mental status.    The rest of their physical exam is significant for asthma, requiring nebs and oxygen, .    Assessment and Plan    The SIRS and exam findings are likely due to asthma exacerbation, receiving albuterol nebs and steroids, there is no sign of sepsis at this time.    Disposition: The patient has an underlying condition of asthma that will continue to affect their vital signs and should not have further labs drawn to assess sepsis unless their clinical appearance changes.    Hayden Bland MD, MD

## 2017-02-06 NOTE — PHARMACY-ANTICOAGULATION SERVICE
Clinical Pharmacy - Warfarin Dosing Consult     Pharmacy has been consulted to manage this patient s warfarin therapy.  Indication: DVT/ PE Treatment  Therapy Goal: INR 2-3  Warfarin Prior to Admission: No  Recent documented change in oral intake/nutrition: Unknown    INR   Date Value Ref Range Status   02/06/2017 2.58* 0.86 - 1.14 Final   02/05/2017 2.69* 0.86 - 1.14 Final       Recommend warfarin 5 mg today.  Patient has averaged 5 mg/day over the last 5 days and has been therapeutic the past 3 days. Pharmacy will monitor Parker Hunt daily and order warfarin doses to achieve specified goal.      Please contact pharmacy as soon as possible if the warfarin needs to be held for a procedure or if the warfarin goals change.        Sofia Powers, PharmD Student

## 2017-02-06 NOTE — PLAN OF CARE
Problem: Goal Outcome Summary  Goal: Goal Outcome Summary  Outcome: No Change  Afebrile. Pt pulse 120s-140s. Respirations 16-24. Denies any pain/discomfort. Pt pulled O2 off while sleeping. Pt 88% on RA. Pt placed back on 2L, O2 back up to 91%. Pt c/o SOB at rest and upon exertion. No other complaints.

## 2017-02-06 NOTE — PLAN OF CARE
Problem: Goal Outcome Summary  Goal: Goal Outcome Summary  Outcome: Improving  Sats remained above 92% on 2L via NC. Pt's HR is higher this evening in the 140s, most likely due to continuous albuterol neb from this afternoon. Pt triggered for sepsis, lactic came back at 4.7 MD notified, no new orders obtained. Pt up in halls x1 during this shift.

## 2017-02-06 NOTE — PLAN OF CARE
Problem: Goal Outcome Summary  Goal: Goal Outcome Summary  Outcome: No Change  's, afebrile. Patient is dyspneic with activity and sitting. Good appetite and well hydrated. LUngs are tight and diminished with exp wheezes. Nebs continue every 4 hours.  At end of shift when patient was drinkning his water he began to cough when coughing hard is had a period of seconds that he blacked out and then came back AOX4, with PERRLA. Charge nurse also notified and on-coming nurse made aware.

## 2017-02-07 VITALS
HEIGHT: 76 IN | HEART RATE: 108 BPM | BODY MASS INDEX: 35.84 KG/M2 | RESPIRATION RATE: 20 BRPM | DIASTOLIC BLOOD PRESSURE: 83 MMHG | WEIGHT: 294.31 LBS | TEMPERATURE: 97.5 F | OXYGEN SATURATION: 92 % | SYSTOLIC BLOOD PRESSURE: 145 MMHG

## 2017-02-07 LAB — INR PPP: 3.04 (ref 0.86–1.14)

## 2017-02-07 PROCEDURE — 94640 AIRWAY INHALATION TREATMENT: CPT | Mod: 76

## 2017-02-07 PROCEDURE — 25000132 ZZH RX MED GY IP 250 OP 250 PS 637: Performed by: FAMILY MEDICINE

## 2017-02-07 PROCEDURE — 40000270 ZZH STATISTIC OXYGEN  O2DAILY TECH TIME

## 2017-02-07 PROCEDURE — 25000128 H RX IP 250 OP 636: Performed by: PEDIATRICS

## 2017-02-07 PROCEDURE — 25000125 ZZHC RX 250: Performed by: FAMILY MEDICINE

## 2017-02-07 PROCEDURE — 85610 PROTHROMBIN TIME: CPT | Performed by: PEDIATRICS

## 2017-02-07 PROCEDURE — 99239 HOSP IP/OBS DSCHRG MGMT >30: CPT | Performed by: FAMILY MEDICINE

## 2017-02-07 PROCEDURE — 90732 PPSV23 VACC 2 YRS+ SUBQ/IM: CPT | Performed by: PEDIATRICS

## 2017-02-07 PROCEDURE — 94640 AIRWAY INHALATION TREATMENT: CPT

## 2017-02-07 PROCEDURE — 40000274 ZZH STATISTIC RCP CONSULT EA 30 MIN

## 2017-02-07 PROCEDURE — 36415 COLL VENOUS BLD VENIPUNCTURE: CPT | Performed by: PEDIATRICS

## 2017-02-07 PROCEDURE — 40000275 ZZH STATISTIC RCP TIME EA 10 MIN

## 2017-02-07 RX ORDER — IPRATROPIUM BROMIDE AND ALBUTEROL SULFATE 2.5; .5 MG/3ML; MG/3ML
3 SOLUTION RESPIRATORY (INHALATION) EVERY 4 HOURS PRN
Qty: 360 ML | Refills: 0 | Status: SHIPPED | OUTPATIENT
Start: 2017-02-07 | End: 2017-03-27

## 2017-02-07 RX ORDER — LISINOPRIL 10 MG/1
10 TABLET ORAL DAILY
Qty: 30 TABLET | Refills: 0 | Status: SHIPPED | OUTPATIENT
Start: 2017-02-07 | End: 2017-03-02

## 2017-02-07 RX ORDER — AMOXICILLIN AND CLAVULANATE POTASSIUM 500; 125 MG/1; MG/1
1 TABLET, FILM COATED ORAL EVERY 8 HOURS
Qty: 11 TABLET | Refills: 0 | Status: SHIPPED | OUTPATIENT
Start: 2017-02-07 | End: 2017-02-11

## 2017-02-07 RX ORDER — PREDNISONE 20 MG/1
40 TABLET ORAL 2 TIMES DAILY
Qty: 30 TABLET | Refills: 0 | Status: SHIPPED
Start: 2017-02-07 | End: 2017-02-21

## 2017-02-07 RX ORDER — WARFARIN SODIUM 2 MG/1
4 TABLET ORAL
Status: DISCONTINUED | OUTPATIENT
Start: 2017-02-07 | End: 2017-02-07 | Stop reason: HOSPADM

## 2017-02-07 RX ORDER — WARFARIN SODIUM 4 MG/1
4 TABLET ORAL DAILY
Qty: 30 TABLET | Refills: 0 | Status: SHIPPED | OUTPATIENT
Start: 2017-02-07 | End: 2017-02-21

## 2017-02-07 RX ORDER — ALBUTEROL SULFATE 0.83 MG/ML
3 SOLUTION RESPIRATORY (INHALATION)
Qty: 360 ML | Refills: 0 | Status: SHIPPED | OUTPATIENT
Start: 2017-02-07 | End: 2017-03-27

## 2017-02-07 RX ADMIN — PNEUMOCOCCAL VACCINE POLYVALENT 0.5 ML
25; 25; 25; 25; 25; 25; 25; 25; 25; 25; 25; 25; 25; 25; 25; 25; 25; 25; 25; 25; 25; 25; 25 INJECTION, SOLUTION INTRAMUSCULAR; SUBCUTANEOUS at 10:54

## 2017-02-07 RX ADMIN — LEVALBUTEROL INHALATION 1.25MG/3ML 1.25 MG: 1.25 SOLUTION RESPIRATORY (INHALATION) at 05:50

## 2017-02-07 RX ADMIN — AMOXICILLIN AND CLAVULANATE POTASSIUM 1 TABLET: 500; 125 TABLET, FILM COATED ORAL at 05:50

## 2017-02-07 RX ADMIN — LEVALBUTEROL INHALATION 1.25MG/3ML 1.25 MG: 1.25 SOLUTION RESPIRATORY (INHALATION) at 10:19

## 2017-02-07 RX ADMIN — LISINOPRIL 10 MG: 10 TABLET ORAL at 09:05

## 2017-02-07 RX ADMIN — AMOXICILLIN AND CLAVULANATE POTASSIUM 1 TABLET: 500; 125 TABLET, FILM COATED ORAL at 13:57

## 2017-02-07 RX ADMIN — PREDNISONE 40 MG: 20 TABLET ORAL at 09:05

## 2017-02-07 RX ADMIN — LEVALBUTEROL INHALATION 1.25MG/3ML 1.25 MG: 1.25 SOLUTION RESPIRATORY (INHALATION) at 13:59

## 2017-02-07 RX ADMIN — LEVALBUTEROL INHALATION 1.25MG/3ML 1.25 MG: 1.25 SOLUTION RESPIRATORY (INHALATION) at 01:40

## 2017-02-07 NOTE — PLAN OF CARE
Writer contacted patient to discuss whether he wanted to have Home Care draw his INRs and patient decided to decline Home Care at this time. [facility] notified that patient has declined services at this time and knows he can contact his PCP if he were to decide he needs home care.

## 2017-02-07 NOTE — DISCHARGE SUMMARY
[facility] Discharge Summary    Parker Hunt MRN# [id number]   Age: 51 year old YOB: 1965     Date of Admission:  2/1/2017  Date of Discharge::  2/7/2017  Admitting Physician:  Alexy Quezada MD  Discharge Physician:  Vivian Cohn MD    Home clinic: [facility]          Admission Diagnoses:   Hypoxia [R09.02]  Other acute pulmonary embolism without acute cor pulmonale (H) [I26.99]          Discharge Diagnosis:   Principal Problem:    Acute pulmonary embolism (H)    Assessment: Present hospitalization, mono causing hypoxia, tachycardia, and respiratory distress. Compensated by acute asthma exacerbation as above. At time of discharge patient is therapeutic on Coumadin alone but continues to need 2-3 L nasal cannula oxygen to maintain saturations.      Plan: Patient will continue with Coumadin and close INR follow-up. Continue with O2 supplementation and will work with PCP to slowly wean off this in weeks going forward.     Active Problems:    Moderate persistent asthma with acute exacerbation    Assessment:  Patient developed a severe asthma exacerbation less than 24 hours after presentation for pulmonary emboli with drastic reduction in air movement, severe wheezing. He was started on oral steroids however transitioned to IV steroids given ongoing worsening as well as scheduled nebulization. Patient did have progressive improvement of his symptoms and was able to transition to oral steroids as well as scheduled albuterol nebs. He continues to need O2 supplementation at time of discharge as above    Plan:  Discharge with prednisone burst and prolonged taper, ongoing scheduled albuterol nebulizations while awake, O2 supplementation as above, and close clinical follow-up for stabilization.      Acute respiratory distress with hypoxia     Assessment:  secondary to PE and asthma exacerbation as above    Plan:  discharge plan as above      Hypertension    Assessment:   patient's blood pressure during this hospitalization was significantly elevated in the 140-160 range systolically. He was started on lisinopril and this was increased to 10 mg daily with patient finally achieving more appropriate control of blood pressure    Plan:  patient will be discharged on lisinopril 10 mg daily and will continue to monitor his blood pressure closely, as he may be able to wean down or off this medication as respiratory status improves.      Acute sinusitis    Assessment:  patient presented on Augmentin and this antibiotic was continued during his hospitalization the patient eventually having improvement of his sinus infection symptoms    Plan:  discharge with coarse completion of Augmentin.      Obesity    Assessment:  chronic and stable    Plan:  no acute intervention needed at discharge, however patient would benefit from weight loss going forward      History of thyroid cancer    Assessment:  no known recurrence    Plan:  ongoing routine outpatient monitoring as previously recommended            Procedures:   No procedures performed during this admission          Medications Prior to Admission:     Prescriptions prior to admission   Medication Sig Dispense Refill Last Dose     loratadine (CLARITIN) 10 MG tablet Take 10 mg by mouth daily   2/1/2017 at 0800     guaiFENesin (MUCINEX) 600 MG 12 hr tablet Take 1,200 mg by mouth 2 times daily   2/1/2017 at 0800     diphenhydrAMINE-acetaminophen (TYLENOL PM)  MG tablet Take 1 tablet by mouth nightly as needed for sleep   Past Week at two nites ago.      albuterol (VENTOLIN HFA) 108 (90 BASE) MCG/ACT inhaler USE 2 PUFFS EVERY SIX HOURS AS NEEDED FOR SHORTNESS OF BREATH AND DIFFICULTY BREATHING 18 g 3      fluticasone (FLOVENT HFA) 110 MCG/ACT inhaler Inhale 2 puffs into the lungs 2 times daily Needs to be seen for follow up. 1 Inhaler 5 2/1/2017 at 0800     [DISCONTINUED] amoxicillin-clavulanate (AUGMENTIN) 500-125 MG per tablet Take 1 tablet  by mouth 3 times daily for 7 days 21 tablet 0 2/1/2017 at 0800     ORDER FOR DME Equipment being ordered: super feet 1 each 0 Not Taking             Discharge Medications:     Current Discharge Medication List      START taking these medications    Details   albuterol (2.5 MG/3ML) 0.083% neb solution Take 1 vial (2.5 mg) by nebulization every 4 hours (while awake)  Qty: 360 mL, Refills: 0    Associated Diagnoses: Moderate persistent asthma with acute exacerbation; Other acute pulmonary embolism without acute cor pulmonale (H)      ipratropium - albuterol 0.5 mg/2.5 mg/3 mL (DUONEB) 0.5-2.5 (3) MG/3ML neb solution Take 1 vial (3 mLs) by nebulization every 4 hours as needed for wheezing  Qty: 360 mL, Refills: 0    Associated Diagnoses: Other acute pulmonary embolism without acute cor pulmonale (H); Moderate persistent asthma with acute exacerbation      lisinopril (PRINIVIL/ZESTRIL) 10 MG tablet Take 1 tablet (10 mg) by mouth daily  Qty: 30 tablet, Refills: 0    Associated Diagnoses: Essential hypertension      predniSONE (DELTASONE) 20 MG tablet Take 2 tablets (40 mg) by mouth 2 times daily x3 days, then 2 tabs in AM 1 tab in PM x3 days, then 2 tabs in AM x3 days, then 1 tab in AM x3 days, then stop.  Qty: 30 tablet, Refills: 0    Associated Diagnoses: Moderate persistent asthma with acute exacerbation      sodium chloride (OCEAN) 0.65 % nasal spray Spray 1 spray into both nostrils every hour as needed for congestion      warfarin (COUMADIN) 4 MG tablet Take 1 tablet (4 mg) by mouth daily At 6 PM - may change as directed by Coumadin Clinic  Qty: 30 tablet, Refills: 0    Associated Diagnoses: Other acute pulmonary embolism without acute cor pulmonale (H)      !! order for DME Equipment being ordered: Nebulizer with attachment kit  Qty: 1 Device, Refills: 0    Associated Diagnoses: Moderate persistent asthma with acute exacerbation; Other acute pulmonary embolism without acute cor pulmonale (H)      !! order for DME  Equipment being ordered: Oxygen  Patient requires 2-3 L oxygen continuously.  Requires portable gas.  Qty: 1 Device, Refills: 0    Associated Diagnoses: Moderate persistent asthma with acute exacerbation; Other acute pulmonary embolism without acute cor pulmonale (H)       !! - Potential duplicate medications found. Please discuss with provider.      CONTINUE these medications which have CHANGED    Details   amoxicillin-clavulanate (AUGMENTIN) 500-125 MG per tablet Take 1 tablet by mouth every 8 hours for 11 doses - first dose the evening of discharge  Qty: 11 tablet, Refills: 0    Associated Diagnoses: Acute non-recurrent sinusitis, unspecified location         CONTINUE these medications which have NOT CHANGED    Details   loratadine (CLARITIN) 10 MG tablet Take 10 mg by mouth daily      guaiFENesin (MUCINEX) 600 MG 12 hr tablet Take 1,200 mg by mouth 2 times daily      diphenhydrAMINE-acetaminophen (TYLENOL PM)  MG tablet Take 1 tablet by mouth nightly as needed for sleep      albuterol (VENTOLIN HFA) 108 (90 BASE) MCG/ACT inhaler USE 2 PUFFS EVERY SIX HOURS AS NEEDED FOR SHORTNESS OF BREATH AND DIFFICULTY BREATHING  Qty: 18 g, Refills: 3    Associated Diagnoses: Wheezing      fluticasone (FLOVENT HFA) 110 MCG/ACT inhaler Inhale 2 puffs into the lungs 2 times daily Needs to be seen for follow up.  Qty: 1 Inhaler, Refills: 5    Associated Diagnoses: Moderate intermittent asthma, uncomplicated      !! ORDER FOR DME Equipment being ordered: super feet  Qty: 1 each, Refills: 0    Associated Diagnoses: Plantar fasciitis       !! - Potential duplicate medications found. Please discuss with provider.                Consultations:   No consultations were requested during this admission          Brief History of Illness:    Patient is a 51-year-old gentleman who presented to the emergency room with shortness of breath and was found to have a left pulmonary emboli with hypoxia and dyspnea. He was initially placed  under observation status and initiated on Lovenox and Coumadin and monitoring of respiratory status.         Hospital Course:    During observation stay patient continued to have intermittent hypoxia and developed worsening tightness as well as wheezing concerning for asthma exacerbation with increased O2 needs and worsening dyspnea. Patient was transitioned to inpatient and started on oral steroids as well as p.r.n. nebs, however he continued to worsen and was transitioned to IV steroids as well as scheduled nebulization. He was continued on Lovenox and Coumadin, and Lovenox was discontinued after Coumadin had been therapeutic for more than 2 days. Patient did have progressive stabilization and improvement of his respiratory status, however continues to need 2-3 L nasal cannula oxygen supplementation to maintain saturations at time of discharge, likely secondary to accommodation of both the PE and asthma exacerbation. He'll be discharged in stable condition with home care          Physical Exam:   Vitals were reviewed  Constitutional:   awake, alert, cooperative, no apparent distress, and appears stated age     Lungs:   No increased work of breathing, decreased air exchange but increased from yesterday, mild expiratory wheezes on exam     Cardiovascular:   Ongoing sinus tachycardia with HR currently in the 100-110 range     Abdomen:   Bowel sounds present, abdomen soft and non-tender without masses     Musculoskeletal:   no lower extremity pitting edema present     Neurologic:   Awake, alert, oriented to name, place and time.      Skin:   normal skin color, texture, turgor              Discharge Instructions and Follow-Up:   Discharge diet: Regular   Discharge activity: Activity as tolerated   Discharge follow-up: Follow up with primary care provider within 7 days  Follow up with INR clinic later this week           Discharge Disposition:   Discharged to home with [facility] services.       Attestation:  I  have reviewed today's vital signs, notes, medications, labs and imaging.    More than 30 minutes was spent on this discharge.      Vivian Cohn MD    Note: Chart documentation done in part with Dragon Voice Recognition software. Although reviewed after completion, some word and grammatical errors may remain.

## 2017-02-07 NOTE — PROGRESS NOTES
S: Patient discharged to home via w/c with wife    B: Acute pulmonary embolism     A: Denies pain, O2 1L NC, sats 92%, lungs diminished. Ambulating safely in room. Eating and voiding well.     R: Discharge instructions reviewed with patient and patient's wife. Listed belongings gathered and returned to patient.          Discharge Nursing Criteria:     Care Plan and Patient education resolved: Yes    New Medications- pt has been educated about purpose and side effects: Yes    Vaccines  Pneumonia Vaccine verified at discharge: Yes  Influenza status verified at discharge:  Yes    MISC  Prescriptions if needed, hard copies sent with patient  Yes  Home and hospital aquired medications returned to patient: Yes  Medication Bin checked and emptied on discharge Yes  Patient reports post-discharge pain management plan is effective: Yes

## 2017-02-07 NOTE — PLAN OF CARE
CTS met with patient to discuss dc needs yesterday. Both he and his wife declined Home Care, stating that wife will be with him all the time and he lives close to clinic, if he were to develop any new problems. Writer stressed the importance of calling 911 if he were to have any new chest pains and they confirmed they would follow those directions. RT set-up home oxygen for patient. Close follow-up with PCP.

## 2017-02-07 NOTE — PROGRESS NOTES
S- Respiratory Therapy  B- PE, Hypoxia  A- On room air at rest SpO2 = 88%.  1 liter oxygen via nasal cannula with rest gives SpO2 = 92% with activity patient requires 2 liters oxygen via nasal cannula to maintain SpO2 = 92%.    R- Patient requests [facility] for home oxygen.

## 2017-02-07 NOTE — PLAN OF CARE
Problem: Goal Outcome Summary  Goal: Goal Outcome Summary  Outcome: Improving  HR in 120's, LS expiratory wheezed throughout, denies any discomfort, loose productive cough, dyspneic with activity or exertion, offered patient prn albuterol jan and refused.

## 2017-02-08 ENCOUNTER — TELEPHONE (OUTPATIENT)
Dept: FAMILY MEDICINE | Facility: CLINIC | Age: 52
End: 2017-02-08

## 2017-02-08 ENCOUNTER — TELEPHONE (OUTPATIENT)
Dept: LAB | Facility: OTHER | Age: 52
End: 2017-02-08

## 2017-02-08 DIAGNOSIS — Z79.01 LONG TERM CURRENT USE OF ANTICOAGULANT THERAPY: ICD-10-CM

## 2017-02-08 DIAGNOSIS — I26.99 ACUTE PULMONARY EMBOLISM (H): Primary | ICD-10-CM

## 2017-02-08 NOTE — TELEPHONE ENCOUNTER
Patient discharged from [facility] IP  ( Inpatient or ER).    Discharge location: [facility]  Discharge date: 2/7/17  Diagnosis: Moderate persistent asthma with acute exacerbation, hyposia  Patient has been in the ER/IP 0/1 times.  Care Coord:  NA  Please follow up as appropriate. If no follow up required, please close encounter.

## 2017-02-08 NOTE — PROGRESS NOTES
SUBJECTIVE:                                                    Parker Hunt is a 51 year old male who presents to clinic today for the following health issues:    HPI        Hospital Follow-up Visit:    Hospital/Nursing Home/IP Rehab Facility: [facility]  Date of Admission: 2/1/2017  Date of Discharge: 2/7/2017  Reason(s) for Admission: Pulmonary function, asthma attack, and sinus infection            Problems taking medications regularly:  None       Medication changes since discharge: Warfarin and lisinopril        Problems adhering to non-medication therapy:  None    Summary of hospitalization:  [facility] discharge summary reviewed  Diagnostic Tests/Treatments reviewed.  Follow up needed: none  Other Healthcare Providers Involved in Patient s Care:         None  Update since discharge: improved.     Post Discharge Medication Reconciliation: discharge medications reconciled, continue medications without change.  Plan of care communicated with patient     Coding guidelines for this visit:  Type of Medical   Decision Making Face-to-Face Visit       within 7 Days of discharge Face-to-Face Visit        within 14 days of discharge   Moderate Complexity 10351 [id number]   High Complexity 21440 [id number]            Problem list and histories reviewed & adjusted, as indicated.  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     Atypical chest pain     Positive D dimer     Thyroid nodules     Hyperlipidemia LDL goal <160     Hypertension     History of thyroid cancer     Moderate intermittent asthma     Acute pulmonary embolism (H)     Hypoxia     Moderate persistent asthma with acute exacerbation     Acute respiratory distress (H)     Acute sinusitis     Obesity     Long-term (current) use of anticoagulants [Z79.01]     Past Surgical History   Procedure  Laterality Date     No history of surgery       Thyroidectomy  4/29/2011     Procedure:THYROIDECTOMY; Jordan Thyroidectomy, Possible Total Thyroidectomy; Surgeon:JOSHUA CHAPPELL; Location:[facility] OR     Colonoscopy  3/21/2014     Procedure: COLONOSCOPY;  colonoscopy;  Surgeon: Sarbjit Sy MD;  Location: [facility]       Social History   Substance Use Topics     Smoking status: Never Smoker     Smokeless tobacco: Never Used     Alcohol use Yes      Comment: 2-3 beers/month     Family History   Problem Relation Age of Onset     Respiratory Brother      asthma     C.A.D. Maternal Grandfather      in 60s had heart issues         Current Outpatient Prescriptions   Medication Sig Dispense Refill     amoxicillin-clavulanate (AUGMENTIN) 500-125 MG per tablet Take 1 tablet by mouth 3 times daily       albuterol (2.5 MG/3ML) 0.083% neb solution Take 1 vial (2.5 mg) by nebulization every 4 hours (while awake) 360 mL 0     lisinopril (PRINIVIL/ZESTRIL) 10 MG tablet Take 1 tablet (10 mg) by mouth daily 30 tablet 0     predniSONE (DELTASONE) 20 MG tablet Take 2 tablets (40 mg) by mouth 2 times daily x3 days, then 2 tabs in AM 1 tab in PM x3 days, then 2 tabs in AM x3 days, then 1 tab in AM x3 days, then stop. 30 tablet 0     warfarin (COUMADIN) 4 MG tablet Take 1 tablet (4 mg) by mouth daily At 6 PM - may change as directed by Coumadin Clinic 30 tablet 0     order for DME Equipment being ordered: Nebulizer with attachment kit 1 Device 0     ipratropium - albuterol 0.5 mg/2.5 mg/3 mL (DUONEB) 0.5-2.5 (3) MG/3ML neb solution Take 1 vial (3 mLs) by nebulization every 4 hours as needed for wheezing (Patient not taking: Reported on 2/14/2017) 360 mL 0     sodium chloride (OCEAN) 0.65 % nasal spray Spray 1 spray into both nostrils every hour as needed for congestion Reported on 2/14/2017       order for DME Equipment being ordered: Oxygen  Patient requires 2-3 L oxygen continuously.  Requires portable gas. (Patient not taking: Reported  "on 2/14/2017) 1 Device 0     loratadine (CLARITIN) 10 MG tablet Take 10 mg by mouth daily Reported on 2/14/2017       guaiFENesin (MUCINEX) 600 MG 12 hr tablet Take 1,200 mg by mouth 2 times daily Reported on 2/14/2017       diphenhydrAMINE-acetaminophen (TYLENOL PM)  MG tablet Take 1 tablet by mouth nightly as needed for sleep Reported on 2/14/2017       albuterol (VENTOLIN HFA) 108 (90 BASE) MCG/ACT inhaler USE 2 PUFFS EVERY SIX HOURS AS NEEDED FOR SHORTNESS OF BREATH AND DIFFICULTY BREATHING (Patient not taking: Reported on 2/14/2017) 18 g 3     fluticasone (FLOVENT HFA) 110 MCG/ACT inhaler Inhale 2 puffs into the lungs 2 times daily Needs to be seen for follow up. (Patient not taking: Reported on 2/14/2017) 1 Inhaler 5     ORDER FOR DME Equipment being ordered: super feet (Patient not taking: Reported on 2/14/2017) 1 each 0     Allergies   Allergen Reactions     No Known Drug Allergies      BP Readings from Last 3 Encounters:   02/14/17 130/82   02/07/17 145/83   03/01/16 140/86    Wt Readings from Last 3 Encounters:   02/14/17 298 lb (135.2 kg)   02/01/17 294 lb 5 oz (133.5 kg)   03/01/16 292 lb 14.4 oz (132.9 kg)                  Labs reviewed in EPIC  Problem list, Medication list, Allergies, and Medical/Social/Surgical histories reviewed in [facility] and updated as appropriate.    ROS:  Constitutional, HEENT, cardiovascular, pulmonary, gi and gu systems are negative, except as otherwise noted.    OBJECTIVE:                                                    /82  Pulse 100  Temp 96  F (35.6  C) (Temporal)  Ht 6' 2.8\" (1.9 m)  Wt 298 lb (135.2 kg)  SpO2 95%  BMI 37.44 kg/m2  Body mass index is 37.44 kg/(m^2).   Physical Exam   Constitutional: He is oriented to person, place, and time. He appears well-developed and well-nourished.   HENT:   Head: Normocephalic and atraumatic.   Cardiovascular: Normal rate and regular rhythm.    Pulmonary/Chest: Effort normal and breath sounds normal.   Neurological: He " is alert and oriented to person, place, and time.   Psychiatric: He has a normal mood and affect.         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 Will consider hematology evaluation if needed  Advised against use of NSAID  Referral to INR clinic  Recheck in 1 month         Hypoxia     Has been discharged on home oxygen . He has since improved and has not been using oxygen   His PE is complicated by asthma exacerbation aswell needing oxygen supplementation  Sats in the clinic today at 95%  Advise to keep doing peak flows  And he notices a drop in his peak flow to increase frequency of rescue inhalers and use oxygen if needed   Other wise he can discontinue oxygen  He is agreeable to the above plan           Other Visit Diagnoses     Need for hepatitis C screening test        Need for prophylactic vaccination and inoculation against influenza                 Monica Odell MD  [facility]

## 2017-02-08 NOTE — TELEPHONE ENCOUNTER
Parker needs an inr according to his discharge orders, please enter a future inr order for lab appt. Tomorrow 2/9/17    Stacie

## 2017-02-09 ENCOUNTER — ANTICOAGULATION THERAPY VISIT (OUTPATIENT)
Dept: ANTICOAGULATION | Facility: OTHER | Age: 52
End: 2017-02-09
Payer: COMMERCIAL

## 2017-02-09 DIAGNOSIS — Z79.01 LONG TERM CURRENT USE OF ANTICOAGULANT THERAPY: ICD-10-CM

## 2017-02-09 DIAGNOSIS — I26.99 ACUTE PULMONARY EMBOLISM (H): ICD-10-CM

## 2017-02-09 DIAGNOSIS — Z79.01 LONG-TERM (CURRENT) USE OF ANTICOAGULANTS: Primary | ICD-10-CM

## 2017-02-09 LAB — INR BLD: 3.4 (ref 0.86–1.14)

## 2017-02-09 PROCEDURE — 99207 ZZC NO CHARGE NURSE ONLY: CPT | Performed by: FAMILY MEDICINE

## 2017-02-09 PROCEDURE — 85610 PROTHROMBIN TIME: CPT | Mod: QW | Performed by: FAMILY MEDICINE

## 2017-02-09 PROCEDURE — 36416 COLLJ CAPILLARY BLOOD SPEC: CPT | Performed by: FAMILY MEDICINE

## 2017-02-09 NOTE — PROGRESS NOTES
ANTICOAGULATION FOLLOW-UP CLINIC VISIT    Patient Name:  Parker Hunt  Date:  2/9/2017  Contact Type:  Telephone    SUBJECTIVE:     Patient Findings     Positives Medication Changes, Initiation of therapy    Comments Pt d/c'd from Hosp on Tues 2/7 for PE. He had started augmentin the week prior to admission and given more as advised to continue this until gone (sometime next week should be done). Also, has a prednisone burst and taper due to his asthma. He was given some coumadin education in hosp and talking with him on the phone, seems to understand this information quite well. He does eat a lot of greens and started eating spinach daily since d/c and will continue this. He is unable to come in Monday for recheck, will come Tuesday when he is here to see his PCP.           OBJECTIVE    INR POINT OF CARE   Date Value Ref Range Status   02/09/2017 3.4* 0.86 - 1.14 Final     Comment:     This test is intended for monitoring Coumadin therapy.  Results are not   accurate   in patients with prolonged INR due to factor deficiency.         ASSESSMENT / PLAN  INR assessment SUPRA    Recheck INR In: 5 DAYS    INR Location Outside lab      Anticoagulation Summary as of 2/9/2017     INR goal 2.0-3.0   Selected INR 3.4! (2/9/2017)   Maintenance plan No maintenance plan   Full instructions 2/9: 2 mg; 2/10: 4 mg; 2/11: 4 mg; 2/12: 4 mg; 2/13: 4 mg; Otherwise No maintenance plan   Next INR check 2/14/2017   Target end date     Indications   Long-term (current) use of anticoagulants [Z79.01] [Z79.01]  Acute pulmonary embolism (H) [I26.99]         Anticoagulation Episode Summary     INR check location     Preferred lab     Send INR reminders to [facility] POOL    Comments 4 mg tabs, PM dose      Anticoagulation Care Providers     Provider Role Specialty Phone number    Monica Odell MD [facility] [phone]            See the Encounter Report to view Anticoagulation Flowsheet and Dosing Calendar (Go to  Encounters tab in chart review, and find the Anticoagulation Therapy Visit)    Dosage adjustment made based on physician directed care plan.    Anushka Ward RN

## 2017-02-09 NOTE — TELEPHONE ENCOUNTER
ED / Discharge Outreach Protocol    Patient Contact    Attempt # 1    Was call answered?  No.  Left message on voicemail with information to call me back.  Per review of chart, patient does have upcoming appointment scheduled with provider at [facility] on 02/14/2017.    Venus Landaverde RN

## 2017-02-10 NOTE — TELEPHONE ENCOUNTER
"Hospital/TCU/ED for chronic condition Discharge Protocol    \"Hi, my name is Rosalinda Kent, a registered nurse, and I am calling from [facility].  I am calling to follow up and see how things are going for you after your recent emergency visit/hospital/TCU stay.\"    Tell me how you are doing now that you are home?\" Patient state that he is doing okay.        Discharge Instructions    \"Let's review your discharge instructions.  What is/are the follow-up recommendations?  Pt. Response: On oxygen 1-2 Liter and 3 Liters at night. Follow up with specialist and INR team    \"Has an appointment with your primary care provider been scheduled?\"   Yes. (confirm)    \"When you see the provider, I would recommend that you bring your medications with you.\"    Medications    \"Tell me what changed about your medicines when you discharged?\"    Changes to chronic meds?    2 or more - Epic MTM referral needed    \"What questions do you have about your medications?\"    None     New diagnoses of heart failure, COPD, diabetes, or MI?    No              Medication reconciliation completed? Yes  Was MTM referral placed (*Make sure to put transitions as reason for referral)?   No    Call Summary    \"What questions or concerns do you have about your recent visit and your follow-up care?\"     none    \"If you have questions or things don't continue to improve, we encourage you contact us through the main clinic number (give number).  Even if the clinic is not open, triage nurses are available 24/7 to help you.     We would like you to know that our clinic has extended hours (provide information).  We also have urgent care (provide details on closest location and hours/contact info)\"      \"Thank you for your time and take care!\"    Rosalinda Kent RN         "

## 2017-02-11 ENCOUNTER — MYC MEDICAL ADVICE (OUTPATIENT)
Dept: FAMILY MEDICINE | Facility: OTHER | Age: 52
End: 2017-02-11

## 2017-02-13 ENCOUNTER — MYC MEDICAL ADVICE (OUTPATIENT)
Dept: FAMILY MEDICINE | Facility: OTHER | Age: 52
End: 2017-02-13

## 2017-02-13 NOTE — TELEPHONE ENCOUNTER
I have not seen him since his hopitalization. Please have him schedule a post hospital follow up to discuss concerns

## 2017-02-13 NOTE — TELEPHONE ENCOUNTER
Spoke with patient, discussed prednisone medication and side effects. Informed the patient has not been experiencing morning sweats since starting this medication. Stated he will address this more with RK at his visit 02/14/17 at 10:20am. No further questions at this time. Hermila Stahl RN

## 2017-02-13 NOTE — TELEPHONE ENCOUNTER
Coumadin question - will have coumadin clinic review and advise if they would like to address. Otherwise, please route back to ERC float pool.     Mary Shannon RN, BSN

## 2017-02-13 NOTE — TELEPHONE ENCOUNTER
Routed to provider to review and advise. Pt has an ER follow up appt. Tomorrow. Trisha Crocker, CMA

## 2017-02-14 ENCOUNTER — MYC MEDICAL ADVICE (OUTPATIENT)
Dept: FAMILY MEDICINE | Facility: OTHER | Age: 52
End: 2017-02-14

## 2017-02-14 ENCOUNTER — OFFICE VISIT (OUTPATIENT)
Dept: FAMILY MEDICINE | Facility: OTHER | Age: 52
End: 2017-02-14
Payer: COMMERCIAL

## 2017-02-14 ENCOUNTER — ANTICOAGULATION THERAPY VISIT (OUTPATIENT)
Dept: ANTICOAGULATION | Facility: OTHER | Age: 52
End: 2017-02-14
Payer: COMMERCIAL

## 2017-02-14 VITALS
TEMPERATURE: 96 F | WEIGHT: 298 LBS | SYSTOLIC BLOOD PRESSURE: 130 MMHG | HEIGHT: 75 IN | HEART RATE: 100 BPM | DIASTOLIC BLOOD PRESSURE: 82 MMHG | BODY MASS INDEX: 37.05 KG/M2 | OXYGEN SATURATION: 95 %

## 2017-02-14 DIAGNOSIS — Z79.01 LONG-TERM (CURRENT) USE OF ANTICOAGULANTS: ICD-10-CM

## 2017-02-14 DIAGNOSIS — I26.99 ACUTE PULMONARY EMBOLISM (H): ICD-10-CM

## 2017-02-14 DIAGNOSIS — Z85.850 HISTORY OF THYROID CANCER: ICD-10-CM

## 2017-02-14 DIAGNOSIS — Z79.01 LONG TERM CURRENT USE OF ANTICOAGULANT THERAPY: ICD-10-CM

## 2017-02-14 DIAGNOSIS — Z11.59 NEED FOR HEPATITIS C SCREENING TEST: ICD-10-CM

## 2017-02-14 DIAGNOSIS — R09.02 HYPOXIA: ICD-10-CM

## 2017-02-14 DIAGNOSIS — I26.99 OTHER ACUTE PULMONARY EMBOLISM WITHOUT ACUTE COR PULMONALE (H): Primary | ICD-10-CM

## 2017-02-14 DIAGNOSIS — Z23 NEED FOR PROPHYLACTIC VACCINATION AND INOCULATION AGAINST INFLUENZA: ICD-10-CM

## 2017-02-14 LAB — INR BLD: 3.2 (ref 0.86–1.14)

## 2017-02-14 PROCEDURE — 36416 COLLJ CAPILLARY BLOOD SPEC: CPT | Performed by: FAMILY MEDICINE

## 2017-02-14 PROCEDURE — 85610 PROTHROMBIN TIME: CPT | Mod: QW | Performed by: FAMILY MEDICINE

## 2017-02-14 PROCEDURE — 99214 OFFICE O/P EST MOD 30 MIN: CPT | Performed by: FAMILY MEDICINE

## 2017-02-14 PROCEDURE — 99207 ZZC NO CHARGE NURSE ONLY: CPT | Performed by: FAMILY MEDICINE

## 2017-02-14 RX ORDER — AMOXICILLIN AND CLAVULANATE POTASSIUM 500; 125 MG/1; MG/1
1 TABLET, FILM COATED ORAL 3 TIMES DAILY
COMMUNITY
End: 2017-02-21

## 2017-02-14 ASSESSMENT — PAIN SCALES - GENERAL: PAINLEVEL: NO PAIN (0)

## 2017-02-14 NOTE — PROGRESS NOTES
ANTICOAGULATION FOLLOW-UP CLINIC VISIT    Patient Name:  Parker Hunt  Date:  2/14/2017  Contact Type:  Face to Face    SUBJECTIVE:     Patient Findings     Positives No Problem Findings    Comments Spoke with pt face to face but he had his INR drawn in the lab and states that he will probably just go to the lab from now on. Ann Parkinson RN, BSN             OBJECTIVE    INR Point of Care   Date Value Ref Range Status   02/14/2017 3.2 (H) 0.86 - 1.14 Final     Comment:     This test is intended for monitoring Coumadin therapy.  Results are not   accurate   in patients with prolonged INR due to factor deficiency.         ASSESSMENT / PLAN  INR assessment THER    Recheck INR In: 1 WEEK    INR Location Outside lab      Anticoagulation Summary as of 2/14/2017     INR goal 2.0-3.0   Today's INR 3.2!   Maintenance plan 2 mg (4 mg x 0.5) on Thu; 4 mg (4 mg x 1) all other days   Full instructions 2 mg on Thu; 4 mg all other days   Weekly total 26 mg   Plan last modified Ann Parkinson RN (2/14/2017)   Next INR check 2/21/2017   Target end date     Indications   Long-term (current) use of anticoagulants [Z79.01] [Z79.01]  Acute pulmonary embolism (H) [I26.99]         Anticoagulation Episode Summary     INR check location     Preferred lab     Send INR reminders to [facility] POOL    Comments 4 mg tabs, PM dose      Anticoagulation Care Providers     Provider Role Specialty Phone number    Monica Odell MD [facility] [phone]            See the Encounter Report to view Anticoagulation Flowsheet and Dosing Calendar (Go to Encounters tab in chart review, and find the Anticoagulation Therapy Visit)    Dosage adjustment made based on physician directed care plan.    Ann Parkinson RN

## 2017-02-14 NOTE — MR AVS SNAPSHOT
After Visit Summary   2/14/2017    Parker Hunt    MRN: [id number]           Patient Information     Date Of Birth          1965        Visit Information        Provider Department      2/14/2017 10:20 AM Monica Odell MD [facility]        Today's Diagnoses     Need for hepatitis C screening test    -  1    Need for prophylactic vaccination and inoculation against influenza           Follow-ups after your visit        Follow-up notes from your care team     Return in about 1 month (around 3/14/2017).      Who to contact     If you have questions or need follow up information about today's clinic visit or your schedule please contact [facility] directly at [phone].  Normal or non-critical lab and imaging results will be communicated to you by MyChart, letter or phone within 4 business days after the clinic has received the results. If you do not hear from us within 7 days, please contact the clinic through [email or web address] or phone. If you have a critical or abnormal lab result, we will notify you by phone as soon as possible.  Submit refill requests through [employer] or call your pharmacy and they will forward the refill request to us. Please allow 3 business days for your refill to be completed.          Additional Information About Your Visit        MyChart Information     [employer] gives you secure access to your electronic health record. If you see a primary care provider, you can also send messages to your care team and make appointments. If you have questions, please call your primary care clinic.  If you do not have a primary care provider, please call [phone] and they will assist you.        Care EveryWhere ID     This is your Care EveryWhere ID. This could be used by other organizations to access your [facility] medical records  [id number]        Your Vitals Were     Pulse Temperature Height Pulse Oximetry BMI (Body Mass Index)       100 96  F (35.6  C)  "(Temporal) 6' 2.8\" (1.9 m) 95% 37.44 kg/m2        Blood Pressure from Last 3 Encounters:   02/14/17 130/82   02/07/17 145/83   03/01/16 140/86    Weight from Last 3 Encounters:   02/14/17 298 lb (135.2 kg)   02/01/17 294 lb 5 oz (133.5 kg)   03/01/16 292 lb 14.4 oz (132.9 kg)              Today, you had the following     No orders found for display       Primary Care Provider Office Phone # Fax #    Monica Odell [phone]       [facility] [address]    [facility] [address]        Thank you!     Thank you for choosing [facility]  for your care. Our goal is always to provide you with excellent care. Hearing back from our patients is one way we can continue to improve our services. Please take a few minutes to complete the written survey that you may receive in the mail after your visit with us. Thank you!             Your Updated Medication List - Protect others around you: Learn how to safely use, store and throw away your medicines at www.disposemymeds.org.          This list is accurate as of: 2/14/17 11:10 AM.  Always use your most recent med list.                   Brand Name Dispense Instructions for use    * albuterol 108 (90 BASE) MCG/ACT Inhaler    VENTOLIN HFA    18 g    USE 2 PUFFS EVERY SIX HOURS AS NEEDED FOR SHORTNESS OF BREATH AND DIFFICULTY BREATHING       * albuterol (2.5 MG/3ML) 0.083% neb solution     360 mL    Take 1 vial (2.5 mg) by nebulization every 4 hours (while awake)       amoxicillin-clavulanate 500-125 MG per tablet    AUGMENTIN     Take 1 tablet by mouth 3 times daily       diphenhydrAMINE-acetaminophen  MG tablet    TYLENOL PM     Take 1 tablet by mouth nightly as needed for sleep Reported on 2/14/2017       fluticasone 110 MCG/ACT Inhaler    FLOVENT HFA    1 Inhaler    Inhale 2 puffs into the lungs 2 times daily Needs to be seen for follow up.       guaiFENesin 600 MG 12 hr tablet    MUCINEX     Take 1,200 mg by " mouth 2 times daily Reported on 2/14/2017       ipratropium - albuterol 0.5 mg/2.5 mg/3 mL 0.5-2.5 (3) MG/3ML neb solution    DUONEB    360 mL    Take 1 vial (3 mLs) by nebulization every 4 hours as needed for wheezing       lisinopril 10 MG tablet    PRINIVIL/ZESTRIL    30 tablet    Take 1 tablet (10 mg) by mouth daily       loratadine 10 MG tablet    CLARITIN     Take 10 mg by mouth daily Reported on 2/14/2017       order for DME     1 each    Equipment being ordered: super feet       * order for DME     1 Device    Equipment being ordered: Nebulizer with attachment kit       * order for DME     1 Device    Equipment being ordered: Oxygen Patient requires 2-3 L oxygen continuously.  Requires portable gas.       predniSONE 20 MG tablet    DELTASONE    30 tablet    Take 2 tablets (40 mg) by mouth 2 times daily x3 days, then 2 tabs in AM 1 tab in PM x3 days, then 2 tabs in AM x3 days, then 1 tab in AM x3 days, then stop.       sodium chloride 0.65 % nasal spray    OCEAN     Spray 1 spray into both nostrils every hour as needed for congestion Reported on 2/14/2017       warfarin 4 MG tablet    COUMADIN    30 tablet    Take 1 tablet (4 mg) by mouth daily At 6 PM - may change as directed by Coumadin Clinic       * Notice:  This list has 4 medication(s) that are the same as other medications prescribed for you. Read the directions carefully, and ask your doctor or other care provider to review them with you.

## 2017-02-14 NOTE — MR AVS SNAPSHOT
Parker Hunt   2/14/2017   Anticoagulation Therapy Visit    Description:  51 year old male   Provider:  Monica Odell MD   Department:  Er Anticoag           INR as of 2/14/2017     Today's INR 3.2!      Anticoagulation Summary as of 2/14/2017     INR goal 2.0-3.0   Today's INR 3.2!   Full instructions 2 mg on Thu; 4 mg all other days   Next INR check 2/21/2017    Indications   Long-term (current) use of anticoagulants [Z79.01] [Z79.01]  Acute pulmonary embolism (H) [I26.99]         Contact Numbers     Clinic Number:         February 2017 Details    Sun Mon Tue Wed Thu Fri Sat        1               2               3               4                 5               6               7               8               9               10               11                 12               13               14      4 mg   See details      15      4 mg         16      2 mg         17      4 mg         18      4 mg           19      4 mg         20      4 mg         21            22               23               24               25                 26               27               28                    Date Details   02/14 This INR check       Date of next INR:  2/21/2017         How to take your warfarin dose     To take:  2 mg Take 0.5 of a 4 mg tablet.    To take:  4 mg Take 1 of the 4 mg tablets.

## 2017-02-14 NOTE — NURSING NOTE
"Chief Complaint   Patient presents with     Hospital F/U     Panel Management     flu, hep c, AAP, ACT       Initial /80 (BP Location: Right arm, Patient Position: Chair, Cuff Size: Adult Large)  Pulse 100  Temp 96  F (35.6  C) (Temporal)  Ht 6' 2.8\" (1.9 m)  Wt 298 lb (135.2 kg)  SpO2 95%  BMI 37.44 kg/m2 Estimated body mass index is 37.44 kg/(m^2) as calculated from the following:    Height as of this encounter: 6' 2.8\" (1.9 m).    Weight as of this encounter: 298 lb (135.2 kg).  Medication Reconciliation: complete    "

## 2017-02-15 PROBLEM — R06.03 ACUTE RESPIRATORY DISTRESS: Status: RESOLVED | Noted: 2017-02-02 | Resolved: 2017-02-15

## 2017-02-15 NOTE — ASSESSMENT & PLAN NOTE
Has been discharged on home oxygen . He has since improved and has not been using oxygen   His PE is complicated by asthma exacerbation aswell needing oxygen supplementation  Sats in the clinic today at 95%  Advise to keep doing peak flows  And he notices a drop in his peak flow to increase frequency of rescue inhalers and use oxygen if needed   Other wise he can discontinue oxygen  He is agreeable to the above plan

## 2017-02-15 NOTE — ASSESSMENT & PLAN NOTE
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

## 2017-02-15 NOTE — ASSESSMENT & PLAN NOTE
Etiology not identified  Reviewed recent imaging and labs  Continue anticoagulation with coumadin for atleast 6 months  Will consider eval for hypercoagulable conditions after he is taken of off coumadin. Will consider hematology evaluation if needed  Advised against use of NSAID  Referral to INR clinic  Recheck in 1 month

## 2017-02-18 ASSESSMENT — ASTHMA QUESTIONNAIRES: ACT_TOTALSCORE: 7

## 2017-02-21 ENCOUNTER — MYC MEDICAL ADVICE (OUTPATIENT)
Dept: FAMILY MEDICINE | Facility: OTHER | Age: 52
End: 2017-02-21

## 2017-02-21 ENCOUNTER — ANTICOAGULATION THERAPY VISIT (OUTPATIENT)
Dept: ANTICOAGULATION | Facility: OTHER | Age: 52
End: 2017-02-21
Payer: COMMERCIAL

## 2017-02-21 DIAGNOSIS — Z79.01 LONG TERM CURRENT USE OF ANTICOAGULANT THERAPY: ICD-10-CM

## 2017-02-21 DIAGNOSIS — I26.99 OTHER ACUTE PULMONARY EMBOLISM WITHOUT ACUTE COR PULMONALE (H): ICD-10-CM

## 2017-02-21 DIAGNOSIS — Z79.01 LONG-TERM (CURRENT) USE OF ANTICOAGULANTS: ICD-10-CM

## 2017-02-21 DIAGNOSIS — I26.99 ACUTE PULMONARY EMBOLISM (H): ICD-10-CM

## 2017-02-21 LAB — INR BLD: 3.6 (ref 0.86–1.14)

## 2017-02-21 PROCEDURE — 99207 ZZC NO CHARGE NURSE ONLY: CPT | Performed by: FAMILY MEDICINE

## 2017-02-21 PROCEDURE — 36416 COLLJ CAPILLARY BLOOD SPEC: CPT | Performed by: FAMILY MEDICINE

## 2017-02-21 PROCEDURE — 85610 PROTHROMBIN TIME: CPT | Mod: QW | Performed by: FAMILY MEDICINE

## 2017-02-21 RX ORDER — WARFARIN SODIUM 4 MG/1
TABLET ORAL
Qty: 30 TABLET | Refills: 0 | COMMUNITY
Start: 2017-02-21 | End: 2017-03-02

## 2017-02-21 NOTE — PROGRESS NOTES
ANTICOAGULATION FOLLOW-UP CLINIC VISIT    Patient Name:  Parker Hunt  Date:  2/21/2017  Contact Type:  Telephone    SUBJECTIVE:     Patient Findings     Positives Change in medications (Pt is finished with augmentin and prednisone), No Problem Findings           OBJECTIVE    INR Point of Care   Date Value Ref Range Status   02/21/2017 3.6 (H) 0.86 - 1.14 Final     Comment:     This test is intended for monitoring Coumadin therapy.  Results are not   accurate   in patients with prolonged INR due to factor deficiency.         ASSESSMENT / PLAN  INR assessment SUPRA    Recheck INR In: 1 WEEK    INR Location Outside lab      Anticoagulation Summary as of 2/21/2017     INR goal 2.0-3.0   Today's INR 3.6!   Maintenance plan 2 mg (4 mg x 0.5) on Tue, Thu; 4 mg (4 mg x 1) all other days   Full instructions 2 mg on Tue, Thu; 4 mg all other days   Weekly total 24 mg   Plan last modified Anushka Ward RN (2/21/2017)   Next INR check 2/28/2017   Target end date     Indications   Long-term (current) use of anticoagulants [Z79.01] [Z79.01]  Acute pulmonary embolism (H) [I26.99]         Anticoagulation Episode Summary     INR check location     Preferred lab     Send INR reminders to [facility] POOL    Comments 4 mg tabs, PM dose      Anticoagulation Care Providers     Provider Role Specialty Phone number    Monica Odell MD [facility] [phone]            See the Encounter Report to view Anticoagulation Flowsheet and Dosing Calendar (Go to Encounters tab in chart review, and find the Anticoagulation Therapy Visit)    Dosage adjustment made based on physician directed care plan.    Anushka Ward, RN

## 2017-02-21 NOTE — MR AVS SNAPSHOT
Parker Hunt   2/21/2017   Anticoagulation Therapy Visit    Description:  51 year old male   Provider:  Monica Odell MD   Department:  Er Anticoag           INR as of 2/21/2017     Today's INR 3.6!      Anticoagulation Summary as of 2/21/2017     INR goal 2.0-3.0   Today's INR 3.6!   Full instructions 2 mg on Tue, Thu; 4 mg all other days   Next INR check 2/28/2017    Indications   Long-term (current) use of anticoagulants [Z79.01] [Z79.01]  Acute pulmonary embolism (H) [I26.99]         Contact Numbers     Clinic Number:         February 2017 Details    Sun Mon Tue Wed Thu Fri Sat        1               2               3               4                 5               6               7               8               9               10               11                 12               13               14               15               16               17               18                 19               20               21      2 mg   See details      22      4 mg         23      2 mg         24      4 mg         25      4 mg           26      4 mg         27      4 mg         28                 Date Details   02/21 This INR check       Date of next INR:  2/28/2017         How to take your warfarin dose     To take:  2 mg Take 0.5 of a 4 mg tablet.    To take:  4 mg Take 1 of the 4 mg tablets.

## 2017-02-21 NOTE — LETTER
[address]  [facility] [address]  [phone]  Dept: [phone]      2/22/2017    Re: Parker Hunt      TO WHOM IT MAY CONCERN:    Parker Hunt can be discharged from home oxygen therapy. His oxygen supplies can be returned.    Please call me with questions or concerns    Cordially    Monica Odell MD  [facility]

## 2017-02-22 NOTE — TELEPHONE ENCOUNTER
Spoke with [employer] and they need a letter written up for discharge order to stop O2, can be faxed to [phone]. Trisha Crocker, CMA

## 2017-02-25 ENCOUNTER — MYC MEDICAL ADVICE (OUTPATIENT)
Dept: FAMILY MEDICINE | Facility: OTHER | Age: 52
End: 2017-02-25

## 2017-02-25 DIAGNOSIS — J45.909 UNCOMPLICATED ASTHMA, UNSPECIFIED ASTHMA SEVERITY: Primary | ICD-10-CM

## 2017-02-27 NOTE — TELEPHONE ENCOUNTER
Left message for pt to return call. [facility] needed letter from [employer] to stop O2. Letter was faxed to [facility]. Please see previous encounter.

## 2017-02-28 NOTE — TELEPHONE ENCOUNTER
Replied via [employer] in other encounter to maximize the information in one reply. See other mychart message about O2 tank (josee forms)

## 2017-02-28 NOTE — TELEPHONE ENCOUNTER
Patient returned call and received message below and updated contact information    Betty Prince  Reception/ Scheduling

## 2017-03-02 ENCOUNTER — ANTICOAGULATION THERAPY VISIT (OUTPATIENT)
Dept: ANTICOAGULATION | Facility: OTHER | Age: 52
End: 2017-03-02
Payer: COMMERCIAL

## 2017-03-02 DIAGNOSIS — Z79.01 LONG TERM CURRENT USE OF ANTICOAGULANT THERAPY: ICD-10-CM

## 2017-03-02 DIAGNOSIS — I10 ESSENTIAL HYPERTENSION: ICD-10-CM

## 2017-03-02 DIAGNOSIS — Z79.01 LONG-TERM (CURRENT) USE OF ANTICOAGULANTS: ICD-10-CM

## 2017-03-02 DIAGNOSIS — I26.99 OTHER ACUTE PULMONARY EMBOLISM WITHOUT ACUTE COR PULMONALE (H): ICD-10-CM

## 2017-03-02 DIAGNOSIS — I26.99 ACUTE PULMONARY EMBOLISM (H): ICD-10-CM

## 2017-03-02 LAB — INR BLD: 2.3 (ref 0.86–1.14)

## 2017-03-02 PROCEDURE — 85610 PROTHROMBIN TIME: CPT | Mod: QW | Performed by: FAMILY MEDICINE

## 2017-03-02 PROCEDURE — 99207 ZZC NO CHARGE NURSE ONLY: CPT | Performed by: FAMILY MEDICINE

## 2017-03-02 PROCEDURE — 36416 COLLJ CAPILLARY BLOOD SPEC: CPT | Performed by: FAMILY MEDICINE

## 2017-03-02 RX ORDER — WARFARIN SODIUM 4 MG/1
TABLET ORAL
Qty: 30 TABLET | Refills: 2 | Status: SHIPPED | OUTPATIENT
Start: 2017-03-02 | End: 2017-06-13

## 2017-03-02 NOTE — MR AVS SNAPSHOT
Parker Hunt   3/2/2017   Anticoagulation Therapy Visit    Description:  51 year old male   Provider:  Monica Odell MD   Department:  Er Anticoag           INR as of 3/2/2017     Today's INR 2.3      Anticoagulation Summary as of 3/2/2017     INR goal 2.0-3.0   Today's INR 2.3   Full instructions 2 mg on Tue, Thu; 4 mg all other days   Next INR check 3/9/2017    Indications   Long-term (current) use of anticoagulants [Z79.01] [Z79.01]  Acute pulmonary embolism (H) [I26.99]         Contact Numbers     Clinic Number:         March 2017 Details    Sun Mon Tue Wed Thu Fri Sat        1               2      2 mg   See details      3      4 mg         4      4 mg           5      4 mg         6      4 mg         7      2 mg         8      4 mg         9            10               11                 12               13               14               15               16               17               18                 19               20               21               22               23               24               25                 26               27               28               29               30               31                 Date Details   03/02 This INR check       Date of next INR:  3/9/2017         How to take your warfarin dose     To take:  2 mg Take 0.5 of a 4 mg tablet.    To take:  4 mg Take 1 of the 4 mg tablets.

## 2017-03-02 NOTE — PROGRESS NOTES
ANTICOAGULATION FOLLOW-UP CLINIC VISIT    Patient Name:  Parker Hunt  Date:  3/2/2017  Contact Type:  Telephone    SUBJECTIVE:     Patient Findings     Positives No Problem Findings           OBJECTIVE    INR Point of Care   Date Value Ref Range Status   03/02/2017 2.3 (H) 0.86 - 1.14 Final     Comment:     This test is intended for monitoring Coumadin therapy.  Results are not   accurate   in patients with prolonged INR due to factor deficiency.         ASSESSMENT / PLAN  INR assessment THER    Recheck INR In: 1 WEEK    INR Location Outside lab      Anticoagulation Summary as of 3/2/2017     INR goal 2.0-3.0   Today's INR 2.3   Maintenance plan 2 mg (4 mg x 0.5) on Tue, Thu; 4 mg (4 mg x 1) all other days   Full instructions 2 mg on Tue, Thu; 4 mg all other days   Weekly total 24 mg   No change documented Anushka Ward, DANTE   Plan last modified Anushka Ward RN (2/21/2017)   Next INR check 3/9/2017   Target end date     Indications   Long-term (current) use of anticoagulants [Z79.01] [Z79.01]  Acute pulmonary embolism (H) [I26.99]         Anticoagulation Episode Summary     INR check location     Preferred lab     Send INR reminders to [facility] POOL    Comments 4 mg tabs, PM dose      Anticoagulation Care Providers     Provider Role Specialty Phone number    Monica Odell MD [facility] [phone]            See the Encounter Report to view Anticoagulation Flowsheet and Dosing Calendar (Go to Encounters tab in chart review, and find the Anticoagulation Therapy Visit)    Dosage adjustment made based on physician directed care plan.    Anushka Ward RN

## 2017-03-03 RX ORDER — LISINOPRIL 10 MG/1
10 TABLET ORAL DAILY
Qty: 30 TABLET | Refills: 0 | Status: SHIPPED | OUTPATIENT
Start: 2017-03-03 | End: 2017-04-12

## 2017-03-03 NOTE — TELEPHONE ENCOUNTER
Lisinopril:  Medication is being filled for 1 time refill only due to:  Patient needs to be seen because due for follow up in clinci 3/14/17..   Next 5 appointments (look out 90 days)     Mar 14, 2017 11:15 AM CDT   [facility]   [facility]    [address] [facility] [address]   [phone]            Mar 14, 2017 11:40 AM CDT   Germant [facility] with Monica Odell MD   [facility]    [address]  [facility] [address]   [phone]                Potassium   Date Value Ref Range Status   02/05/2017 4.4 3.4 - 5.3 mmol/L Final     Creatinine   Date Value Ref Range Status   02/05/2017 1.02 0.66 - 1.25 mg/dL Final     BP Readings from Last 3 Encounters:   02/14/17 130/82   02/07/17 145/83   03/01/16 140/86     Mary Shannon, RN, BSN

## 2017-03-10 ENCOUNTER — ANTICOAGULATION THERAPY VISIT (OUTPATIENT)
Dept: ANTICOAGULATION | Facility: OTHER | Age: 52
End: 2017-03-10
Payer: COMMERCIAL

## 2017-03-10 DIAGNOSIS — I26.99 ACUTE PULMONARY EMBOLISM (H): ICD-10-CM

## 2017-03-10 DIAGNOSIS — Z79.01 LONG TERM CURRENT USE OF ANTICOAGULANT THERAPY: ICD-10-CM

## 2017-03-10 DIAGNOSIS — I26.99 OTHER ACUTE PULMONARY EMBOLISM WITHOUT ACUTE COR PULMONALE (H): ICD-10-CM

## 2017-03-10 DIAGNOSIS — Z79.01 LONG-TERM (CURRENT) USE OF ANTICOAGULANTS: ICD-10-CM

## 2017-03-10 LAB — INR BLD: 2.5 (ref 0.86–1.14)

## 2017-03-10 PROCEDURE — 99207 ZZC NO CHARGE NURSE ONLY: CPT | Performed by: FAMILY MEDICINE

## 2017-03-10 PROCEDURE — 36416 COLLJ CAPILLARY BLOOD SPEC: CPT | Performed by: FAMILY MEDICINE

## 2017-03-10 PROCEDURE — 85610 PROTHROMBIN TIME: CPT | Mod: QW | Performed by: FAMILY MEDICINE

## 2017-03-10 NOTE — MR AVS SNAPSHOT
Parker Hunt   3/10/2017   Anticoagulation Therapy Visit    Description:  51 year old male   Provider:  Monica Odell MD   Department:  [facility]           INR as of 3/10/2017     Today's INR 2.5      Anticoagulation Summary as of 3/10/2017     INR goal 2.0-3.0   Today's INR 2.5   Full instructions 2 mg on Tue, Thu; 4 mg all other days   Next INR check 3/24/2017    Indications   Long-term (current) use of anticoagulants [Z79.01] [Z79.01]  Acute pulmonary embolism (H) [I26.99]         Contact Numbers     Clinic Number:         March 2017 Details    Sun Mon Tue Wed Thu Fri Sat        1               2               3               4                 5               6               7               8               9               10      4 mg   See details      11      4 mg           12      4 mg         13      4 mg         14      2 mg         15      4 mg         16      2 mg         17      4 mg         18      4 mg           19      4 mg         20      4 mg         21      2 mg         22      4 mg         23      2 mg         24            25                 26               27               28               29               30               31                 Date Details   03/10 This INR check       Date of next INR:  3/24/2017         How to take your warfarin dose     To take:  2 mg Take 0.5 of a 4 mg tablet.    To take:  4 mg Take 1 of the 4 mg tablets.

## 2017-03-10 NOTE — PROGRESS NOTES
ANTICOAGULATION FOLLOW-UP CLINIC VISIT    Patient Name:  Parker Hunt  Date:  3/10/2017  Contact Type:  Telephone    SUBJECTIVE:     Patient Findings     Positives No Problem Findings           OBJECTIVE    INR Point of Care   Date Value Ref Range Status   03/10/2017 2.5 (H) 0.86 - 1.14 Final       ASSESSMENT / PLAN  INR assessment THER    Recheck INR In: 2 WEEKS    INR Location Outside lab      Anticoagulation Summary as of 3/10/2017     INR goal 2.0-3.0   Today's INR 2.5   Maintenance plan 2 mg (4 mg x 0.5) on Tue, Thu; 4 mg (4 mg x 1) all other days   Full instructions 2 mg on Tue, Thu; 4 mg all other days   Weekly total 24 mg   No change documented Charmaine Brennan RN   Plan last modified Anushka Ward RN (2/21/2017)   Next INR check 3/24/2017   Target end date     Indications   Long-term (current) use of anticoagulants [Z79.01] [Z79.01]  Acute pulmonary embolism (H) [I26.99]         Anticoagulation Episode Summary     INR check location     Preferred lab     Send INR reminders to [facility] POOL    Comments 4 mg tabs, PM dose      Anticoagulation Care Providers     Provider Role Specialty Phone number    Monica Odell MD [facility] [phone]            See the Encounter Report to view Anticoagulation Flowsheet and Dosing Calendar (Go to Encounters tab in chart review, and find the Anticoagulation Therapy Visit)    Dosage adjustment made based on physician directed care plan.        Charmaine Brennan, RN

## 2017-03-16 NOTE — PROGRESS NOTES
SUBJECTIVE:                                                    Parker Hunt is a 51 year old male who presents to clinic today for the following health issues:      HPI        Hospital Follow-up Visit:    Hospital/Nursing Home/IP Rehab Facility: [facility]  Date of Admission: 2/1/17  Date of Discharge: 2/7/17  Reason(s) for Admission: PE & Asthma            Problems taking medications regularly:  None       Medication changes since discharge: None       Problems adhering to non-medication therapy:  None    Summary of hospitalization:  [facility] discharge summary reviewed  Diagnostic Tests/Treatments reviewed.  Follow up needed: none  Other Healthcare Providers Involved in Patient s Care:         None  Update since discharge: improved.     Post Discharge Medication Reconciliation: discharge medications reconciled, continue medications without change.  Plan of care communicated with patient     Coding guidelines for this visit:  Type of Medical   Decision Making Face-to-Face Visit       within 7 Days of discharge Face-to-Face Visit        within 14 days of discharge   Moderate Complexity [id number]   High Complexity [id number]            Problem list and histories reviewed & adjusted, as indicated.  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     Atypical chest pain     Positive D dimer     Thyroid nodules     Hyperlipidemia LDL goal <160     Hypertension     History of thyroid cancer     Moderate intermittent asthma     Acute pulmonary embolism (H)     Hypoxia     Moderate persistent asthma with acute exacerbation     Acute sinusitis     Obesity     Long-term (current) use of anticoagulants [Z79.01]     Past Surgical History:   Procedure Laterality Date     COLONOSCOPY  3/21/2014    Procedure: COLONOSCOPY;  colonoscopy;  Surgeon:  Sarbjit Sy MD;  Location:  GI     NO HISTORY OF SURGERY       THYROIDECTOMY  4/29/2011    Procedure:THYROIDECTOMY; Jordan Thyroidectomy, Possible Total Thyroidectomy; Surgeon:JOSHUA CHAPPELL; Location: OR       Social History   Substance Use Topics     Smoking status: Never Smoker     Smokeless tobacco: Never Used     Alcohol use Yes      Comment: 2-3 beers/month     Family History   Problem Relation Age of Onset     Respiratory Brother      asthma     C.A.D. Maternal Grandfather      in 60s had heart issues         Current Outpatient Prescriptions   Medication Sig Dispense Refill     beclomethasone (QVAR) 80 MCG/ACT Inhaler Inhale 2 puffs into the lungs 2 times daily 1 Inhaler 1     albuterol (VENTOLIN HFA) 108 (90 BASE) MCG/ACT Inhaler USE 2 PUFFS EVERY SIX HOURS AS NEEDED FOR SHORTNESS OF BREATH AND DIFFICULTY BREATHING 18 g 3     lisinopril (PRINIVIL/ZESTRIL) 10 MG tablet Take 1 tablet (10 mg) by mouth daily 30 tablet 0     warfarin (COUMADIN) 4 MG tablet Take 2 mg TU, TH and 4 mg all other days or as directed by Coumadin Clinic 30 tablet 2     [DISCONTINUED] beclomethasone (QVAR) 80 MCG/ACT Inhaler Inhale 2 puffs into the lungs 2 times daily 1 Inhaler 1     [DISCONTINUED] albuterol (2.5 MG/3ML) 0.083% neb solution Take 1 vial (2.5 mg) by nebulization every 4 hours (while awake) (Patient not taking: Reported on 3/27/2017) 360 mL 0     [DISCONTINUED] albuterol (VENTOLIN HFA) 108 (90 BASE) MCG/ACT inhaler USE 2 PUFFS EVERY SIX HOURS AS NEEDED FOR SHORTNESS OF BREATH AND DIFFICULTY BREATHING 18 g 3     Allergies   Allergen Reactions     No Known Drug Allergies      BP Readings from Last 3 Encounters:   03/27/17 130/84   02/14/17 130/82   02/07/17 145/83    Wt Readings from Last 3 Encounters:   03/27/17 (!) 308 lb (139.7 kg)   02/14/17 298 lb (135.2 kg)   02/01/17 294 lb 5 oz (133.5 kg)                  Labs reviewed in EPIC    ROS:  Constitutional, HEENT, cardiovascular, pulmonary, gi and gu systems  are negative, except as otherwise noted.    OBJECTIVE:                                                    /84 (BP Location: Right arm, Patient Position: Chair, Cuff Size: Adult Large)  Pulse 75  Temp 98  F (36.7  C) (Oral)  Resp 16  Wt (!) 308 lb (139.7 kg)  SpO2 96%  BMI 38.7 kg/m2  Body mass index is 38.7 kg/(m^2).  Physical Exam   Constitutional: He is oriented to person, place, and time. He appears well-developed and well-nourished.   HENT:   Head: Normocephalic and atraumatic.   Cardiovascular: Normal rate and regular rhythm.    Pulmonary/Chest: Effort normal and breath sounds normal.   Neurological: He is alert and oriented to person, place, and time.   Psychiatric: He has a normal mood and affect.         Diagnostic Test Results:  none      ASSESSMENT/PLAN:                                                      Problem List Items Addressed This Visit     None      Visit Diagnoses     Need for hepatitis C screening test    -  Primary    Relevant Orders    Hepatitis C Screen Reflex to HCV RNA Quant and Genotype    Uncomplicated asthma, unspecified asthma severity        Relevant Medications    beclomethasone (QVAR) 80 MCG/ACT Inhaler    albuterol (VENTOLIN HFA) 108 (90 BASE) MCG/ACT Inhaler    Wheezing        Relevant Medications    beclomethasone (QVAR) 80 MCG/ACT Inhaler    albuterol (VENTOLIN HFA) 108 (90 BASE) MCG/ACT Inhaler         Please see last note for details  Well controlled wheezes. Is off oxygen  Coumadin INR is well controlled  Recheck in 6 months. Can place orders for hypercoagulation work up before next visit when he stops coumadin   He is agreeable to the above plan  Monica Odell MD  [facility]

## 2017-03-23 ENCOUNTER — ANTICOAGULATION THERAPY VISIT (OUTPATIENT)
Dept: ANTICOAGULATION | Facility: OTHER | Age: 52
End: 2017-03-23
Payer: COMMERCIAL

## 2017-03-23 DIAGNOSIS — Z79.01 LONG TERM CURRENT USE OF ANTICOAGULANT THERAPY: ICD-10-CM

## 2017-03-23 DIAGNOSIS — I26.99 ACUTE PULMONARY EMBOLISM (H): ICD-10-CM

## 2017-03-23 DIAGNOSIS — Z79.01 LONG-TERM (CURRENT) USE OF ANTICOAGULANTS: ICD-10-CM

## 2017-03-23 DIAGNOSIS — I26.99 OTHER ACUTE PULMONARY EMBOLISM WITHOUT ACUTE COR PULMONALE (H): ICD-10-CM

## 2017-03-23 LAB — INR BLD: 1.8 (ref 0.86–1.14)

## 2017-03-23 PROCEDURE — 36416 COLLJ CAPILLARY BLOOD SPEC: CPT | Performed by: FAMILY MEDICINE

## 2017-03-23 PROCEDURE — 85610 PROTHROMBIN TIME: CPT | Mod: QW | Performed by: FAMILY MEDICINE

## 2017-03-23 PROCEDURE — 99207 ZZC NO CHARGE NURSE ONLY: CPT | Performed by: FAMILY MEDICINE

## 2017-03-23 NOTE — PROGRESS NOTES
ANTICOAGULATION FOLLOW-UP CLINIC VISIT    Patient Name:  Parker Hunt  Date:  3/23/2017  Contact Type:  Telephone    SUBJECTIVE:     Patient Findings     Positives Unexplained INR or factor level change    Comments Pt denies any missed doses, increased green or change in meds/vitamins. If anything, he's eaten less greens which should have increased his INR.           OBJECTIVE    INR Point of Care   Date Value Ref Range Status   03/23/2017 1.8 (H) 0.86 - 1.14 Final     Comment:     This test is intended for monitoring Coumadin therapy.  Results are not   accurate   in patients with prolonged INR due to factor deficiency.         ASSESSMENT / PLAN  INR assessment SUB    Recheck INR In: 1 WEEK    INR Location Outside lab      Anticoagulation Summary as of 3/23/2017     INR goal 2.0-3.0   Today's INR 1.8!   Maintenance plan 2 mg (4 mg x 0.5) on Tue; 4 mg (4 mg x 1) all other days   Full instructions 2 mg on Tue; 4 mg all other days   Weekly total 26 mg   Plan last modified Anushka Ward RN (3/23/2017)   Next INR check 3/30/2017   Target end date     Indications   Long-term (current) use of anticoagulants [Z79.01] [Z79.01]  Acute pulmonary embolism (H) [I26.99]         Anticoagulation Episode Summary     INR check location     Preferred lab     Send INR reminders to [facility] POOL    Comments 4 mg tabs, PM dose      Anticoagulation Care Providers     Provider Role Specialty Phone number    Monica Odell MD [facility] [phone]            See the Encounter Report to view Anticoagulation Flowsheet and Dosing Calendar (Go to Encounters tab in chart review, and find the Anticoagulation Therapy Visit)    Dosage adjustment made based on physician directed care plan.    Anushka Ward RN

## 2017-03-23 NOTE — MR AVS SNAPSHOT
Parker Hunt   3/23/2017   Anticoagulation Therapy Visit    Description:  51 year old male   Provider:  Monica Odell MD   Department:  [facility]           INR as of 3/23/2017     Today's INR 1.8!      Anticoagulation Summary as of 3/23/2017     INR goal 2.0-3.0   Today's INR 1.8!   Full instructions 2 mg on Tue; 4 mg all other days   Next INR check 3/30/2017    Indications   Long-term (current) use of anticoagulants [Z79.01] [Z79.01]  Acute pulmonary embolism (H) [I26.99]         Contact Numbers     Clinic Number:         March 2017 Details    Sun Mon Tue Wed Thu Fri Sat        1               2               3               4                 5               6               7               8               9               10               11                 12               13               14               15               16               17               18                 19               20               21               22               23      4 mg   See details      24      4 mg         25      4 mg           26      4 mg         27      4 mg         28      2 mg         29      4 mg         30            31                 Date Details   03/23 This INR check       Date of next INR:  3/30/2017         How to take your warfarin dose     To take:  2 mg Take 0.5 of a 4 mg tablet.    To take:  4 mg Take 1 of the 4 mg tablets.

## 2017-03-27 ENCOUNTER — OFFICE VISIT (OUTPATIENT)
Dept: FAMILY MEDICINE | Facility: OTHER | Age: 52
End: 2017-03-27
Payer: COMMERCIAL

## 2017-03-27 VITALS
BODY MASS INDEX: 38.7 KG/M2 | WEIGHT: 308 LBS | HEART RATE: 75 BPM | TEMPERATURE: 98 F | DIASTOLIC BLOOD PRESSURE: 84 MMHG | OXYGEN SATURATION: 96 % | SYSTOLIC BLOOD PRESSURE: 130 MMHG | RESPIRATION RATE: 16 BRPM

## 2017-03-27 DIAGNOSIS — Z11.59 NEED FOR HEPATITIS C SCREENING TEST: Primary | ICD-10-CM

## 2017-03-27 DIAGNOSIS — J45.909 UNCOMPLICATED ASTHMA, UNSPECIFIED ASTHMA SEVERITY: ICD-10-CM

## 2017-03-27 DIAGNOSIS — R06.2 WHEEZING: ICD-10-CM

## 2017-03-27 PROCEDURE — 99213 OFFICE O/P EST LOW 20 MIN: CPT | Performed by: FAMILY MEDICINE

## 2017-03-27 RX ORDER — ALBUTEROL SULFATE 90 UG/1
AEROSOL, METERED RESPIRATORY (INHALATION)
Qty: 18 G | Refills: 3 | Status: SHIPPED | OUTPATIENT
Start: 2017-03-27 | End: 2018-03-08

## 2017-03-27 ASSESSMENT — PAIN SCALES - GENERAL: PAINLEVEL: NO PAIN (0)

## 2017-03-27 NOTE — MR AVS SNAPSHOT
After Visit Summary   3/27/2017    Parker Hunt    MRN: [id number]           Patient Information     Date Of Birth          1965        Visit Information        Provider Department      3/27/2017 4:00 PM Monica Odell MD [facility]        Today's Diagnoses     Need for hepatitis C screening test    -  1    Uncomplicated asthma, unspecified asthma severity        Wheezing           Follow-ups after your visit        Future tests that were ordered for you today     Open Future Orders        Priority Expected Expires Ordered    Hepatitis C Screen Reflex to HCV RNA Quant and Genotype Routine  3/27/2018 3/27/2017            Who to contact     If you have questions or need follow up information about today's clinic visit or your schedule please contact [facility] directly at [phone].  Normal or non-critical lab and imaging results will be communicated to you by [email or web address], letter or phone within 4 business days after the clinic has received the results. If you do not hear from us within 7 days, please contact the clinic through [email or web address] or phone. If you have a critical or abnormal lab result, we will notify you by phone as soon as possible.  Submit refill requests through [employer] or call your pharmacy and they will forward the refill request to us. Please allow 3 business days for your refill to be completed.          Additional Information About Your Visit        MyChar[employer] Zebra Information     [employer] gives you secure access to your electronic health record. If you see a primary care provider, you can also send messages to your care team and make appointments. If you have questions, please call your primary care clinic.  If you do not have a primary care provider, please call [phone] and they will assist you.        Care EveryWhere ID     This is your Care EveryWhere ID. This could be used by other organizations to access your [facility]  records  [id number]        Your Vitals Were     Pulse Temperature Respirations Pulse Oximetry BMI (Body Mass Index)       75 98  F (36.7  C) (Oral) 16 96% 38.7 kg/m2        Blood Pressure from Last 3 Encounters:   03/27/17 130/84   02/14/17 130/82   02/07/17 145/83    Weight from Last 3 Encounters:   03/27/17 (!) 308 lb (139.7 kg)   02/14/17 298 lb (135.2 kg)   02/01/17 294 lb 5 oz (133.5 kg)                 Today's Medication Changes          These changes are accurate as of: 3/27/17  4:43 PM.  If you have any questions, ask your nurse or doctor.               These medicines have changed or have updated prescriptions.        Dose/Directions    albuterol 108 (90 BASE) MCG/ACT Inhaler   Commonly known as:  VENTOLIN HFA   This may have changed:  Another medication with the same name was removed. Continue taking this medication, and follow the directions you see here.   Used for:  Wheezing   Changed by:  Monica Odell MD        USE 2 PUFFS EVERY SIX HOURS AS NEEDED FOR SHORTNESS OF BREATH AND DIFFICULTY BREATHING   Quantity:  18 g   Refills:  3         Stop taking these medicines if you haven't already. Please contact your care team if you have questions.     diphenhydrAMINE-acetaminophen  MG tablet   Commonly known as:  TYLENOL PM   Stopped by:  Monica Odell MD           guaiFENesin 600 MG 12 hr tablet   Commonly known as:  MUCINEX   Stopped by:  Monica Odell MD           ipratropium - albuterol 0.5 mg/2.5 mg/3 mL 0.5-2.5 (3) MG/3ML neb solution   Commonly known as:  DUONEB   Stopped by:  Monica Odell MD           loratadine 10 MG tablet   Commonly known as:  CLARITIN   Stopped by:  Monica Odell MD           order for DME   Stopped by:  Monica Odell MD           order for DME   Stopped by:  Monica Odell MD           sodium chloride 0.65 % nasal spray   Commonly known as:  OCEAN   Stopped by:  Monica Odell MD                Where to get your medicines      These  [place] [facility] [place] [address]  [address], [facility] [address]     Phone:  [phone]     albuterol 108 (90 BASE) MCG/ACT Inhaler    beclomethasone 80 MCG/ACT Inhaler                Primary Care Provider Office Phone # Fax #    Monica Odell [phone]       [facility] [address]    [facility] [address]        Thank you!     Thank you for choosing [facility]  for your care. Our goal is always to provide you with excellent care. Hearing back from our patients is one way we can continue to improve our services. Please take a few minutes to complete the written survey that you may receive in the mail after your visit with us. Thank you!             Your Updated Medication List - Protect others around you: Learn how to safely use, store and throw away your medicines at www.disposemymeds.org.          This list is accurate as of: 3/27/17  4:43 PM.  Always use your most recent med list.                   Brand Name Dispense Instructions for use    albuterol 108 (90 BASE) MCG/ACT Inhaler    VENTOLIN HFA    18 g    USE 2 PUFFS EVERY SIX HOURS AS NEEDED FOR SHORTNESS OF BREATH AND DIFFICULTY BREATHING       beclomethasone 80 MCG/ACT Inhaler    QVAR    1 Inhaler    Inhale 2 puffs into the lungs 2 times daily       lisinopril 10 MG tablet    PRINIVIL/ZESTRIL    30 tablet    Take 1 tablet (10 mg) by mouth daily       warfarin 4 MG tablet    COUMADIN    30 tablet    Take 2 mg TU, TH and 4 mg all other days or as directed by Coumadin Clinic

## 2017-03-27 NOTE — NURSING NOTE
"Chief Complaint   Patient presents with     Hospital F/U     Health Maintenance     hep c screening       Initial /84 (BP Location: Right arm, Patient Position: Chair, Cuff Size: Adult Large)  Pulse 75  Temp 98  F (36.7  C) (Oral)  Resp 16  Wt (!) 308 lb (139.7 kg)  SpO2 96%  BMI 38.7 kg/m2 Estimated body mass index is 38.7 kg/(m^2) as calculated from the following:    Height as of 2/14/17: 6' 2.8\" (1.9 m).    Weight as of this encounter: 308 lb (139.7 kg).  Medication Reconciliation: complete   Cadence Monterroso CMA (AAMA)      "

## 2017-03-31 ENCOUNTER — ANTICOAGULATION THERAPY VISIT (OUTPATIENT)
Dept: ANTICOAGULATION | Facility: OTHER | Age: 52
End: 2017-03-31
Payer: COMMERCIAL

## 2017-03-31 DIAGNOSIS — I26.99 OTHER ACUTE PULMONARY EMBOLISM WITHOUT ACUTE COR PULMONALE (H): ICD-10-CM

## 2017-03-31 DIAGNOSIS — Z11.59 NEED FOR HEPATITIS C SCREENING TEST: ICD-10-CM

## 2017-03-31 DIAGNOSIS — I26.99 ACUTE PULMONARY EMBOLISM (H): ICD-10-CM

## 2017-03-31 DIAGNOSIS — Z79.01 LONG-TERM (CURRENT) USE OF ANTICOAGULANTS: ICD-10-CM

## 2017-03-31 DIAGNOSIS — Z85.850 HISTORY OF THYROID CANCER: ICD-10-CM

## 2017-03-31 DIAGNOSIS — Z79.01 LONG TERM CURRENT USE OF ANTICOAGULANT THERAPY: ICD-10-CM

## 2017-03-31 LAB
HCV AB SERPL QL IA: NORMAL
INR BLD: 2.2 (ref 0.86–1.14)
TSH SERPL DL<=0.005 MIU/L-ACNC: 2.71 MU/L (ref 0.4–4)

## 2017-03-31 PROCEDURE — 85610 PROTHROMBIN TIME: CPT | Mod: QW | Performed by: FAMILY MEDICINE

## 2017-03-31 PROCEDURE — 36415 COLL VENOUS BLD VENIPUNCTURE: CPT | Performed by: FAMILY MEDICINE

## 2017-03-31 PROCEDURE — 86803 HEPATITIS C AB TEST: CPT | Performed by: FAMILY MEDICINE

## 2017-03-31 PROCEDURE — 84443 ASSAY THYROID STIM HORMONE: CPT | Performed by: FAMILY MEDICINE

## 2017-03-31 PROCEDURE — 99207 ZZC NO CHARGE NURSE ONLY: CPT | Performed by: FAMILY MEDICINE

## 2017-03-31 NOTE — MR AVS SNAPSHOT
Parker Hunt   3/31/2017   Anticoagulation Therapy Visit    Description:  51 year old male   Provider:  Monica Odell MD   Department:  [facility]           INR as of 3/31/2017     Today's INR 2.2      Anticoagulation Summary as of 3/31/2017     INR goal 2.0-3.0   Today's INR 2.2   Full instructions 2 mg on Tue; 4 mg all other days   Next INR check 4/14/2017    Indications   Long-term (current) use of anticoagulants [Z79.01] [Z79.01]  Acute pulmonary embolism (H) [I26.99]         Contact Numbers     Clinic Number:         March 2017 Details    Sun Mon Tue Wed Thu Fri Sat        1               2               3               4                 5               6               7               8               9               10               11                 12               13               14               15               16               17               18                 19               20               21               22               23               24               25                 26               27               28               29               30               31      4 mg   See details        Date Details   03/31 This INR check               How to take your warfarin dose     To take:  4 mg Take 1 of the 4 mg tablets.           April 2017 Details    Sun Mon Tue Wed Thu Fri Sat           1      4 mg           2      4 mg         3      4 mg         4      2 mg         5      4 mg         6      4 mg         7      4 mg         8      4 mg           9      4 mg         10      4 mg         11      2 mg         12      4 mg         13      4 mg         14            15                 16               17               18               19               20               21               22                 23               24               25               26               27               28               29                 30                      Date Details   No additional details     Date of next INR:  4/14/2017         How to take your warfarin dose     To take:  2 mg Take 0.5 of a 4 mg tablet.    To take:  4 mg Take 1 of the 4 mg tablets.

## 2017-03-31 NOTE — PROGRESS NOTES
ANTICOAGULATION FOLLOW-UP CLINIC VISIT    Patient Name:  Parker Hunt  Date:  3/31/2017  Contact Type:  Telephone    SUBJECTIVE:     Patient Findings     Positives No Problem Findings           OBJECTIVE    INR Point of Care   Date Value Ref Range Status   03/31/2017 2.2 (H) 0.86 - 1.14 Final     Comment:     This test is intended for monitoring Coumadin therapy.  Results are not   accurate   in patients with prolonged INR due to factor deficiency.         ASSESSMENT / PLAN  INR assessment THER    Recheck INR In: 2 WEEKS    INR Location Outside lab      Anticoagulation Summary as of 3/31/2017     INR goal 2.0-3.0   Today's INR 2.2   Maintenance plan 2 mg (4 mg x 0.5) on Tue; 4 mg (4 mg x 1) all other days   Full instructions 2 mg on Tue; 4 mg all other days   Weekly total 26 mg   No change documented Charmaine Brennan RN   Plan last modified Anushka Ward RN (3/23/2017)   Next INR check 4/14/2017   Target end date     Indications   Long-term (current) use of anticoagulants [Z79.01] [Z79.01]  Acute pulmonary embolism (H) [I26.99]         Anticoagulation Episode Summary     INR check location     Preferred lab     Send INR reminders to [facility] POOL    Comments 4 mg tabs, PM dose      Anticoagulation Care Providers     Provider Role Specialty Phone number    Monica Odell MD [facility] [phone]            See the Encounter Report to view Anticoagulation Flowsheet and Dosing Calendar (Go to Encounters tab in chart review, and find the Anticoagulation Therapy Visit)    Dosage adjustment made based on physician directed care plan.    Pt has to pay out of pocket for tests so he would like them to be spaced out as much as they can be. I informed him that as long as he is stable we can keep extending it. If INR is within range next time, OK to extend 3 weeks.     Charmaine Brennan, DANTE

## 2017-04-12 ENCOUNTER — MYC MEDICAL ADVICE (OUTPATIENT)
Dept: FAMILY MEDICINE | Facility: OTHER | Age: 52
End: 2017-04-12

## 2017-04-12 DIAGNOSIS — I10 ESSENTIAL HYPERTENSION: ICD-10-CM

## 2017-04-12 RX ORDER — LISINOPRIL 10 MG/1
10 TABLET ORAL DAILY
Qty: 90 TABLET | Refills: 2 | Status: SHIPPED | OUTPATIENT
Start: 2017-04-12 | End: 2017-11-30

## 2017-04-12 NOTE — TELEPHONE ENCOUNTER
Prescription approved per [facility].  Annie Ly, RN, BSN

## 2017-04-12 NOTE — TELEPHONE ENCOUNTER
Lisinopril   Last Written Prescription Date: 03/03/2017  Last Fill Quantity: 30, # refills: 0  Last Office Visit with [facility], [facility] or [facility] prescribing provider: 03/27/2017       Potassium   Date Value Ref Range Status   02/05/2017 4.4 3.4 - 5.3 mmol/L Final     Creatinine   Date Value Ref Range Status   02/05/2017 1.02 0.66 - 1.25 mg/dL Final     BP Readings from Last 3 Encounters:   03/27/17 130/84   02/14/17 130/82   02/07/17 145/83     Patient is requesting more than 30 tabs at a time. Pended.  Nikki Thomas, CMA

## 2017-04-13 ENCOUNTER — ANTICOAGULATION THERAPY VISIT (OUTPATIENT)
Dept: ANTICOAGULATION | Facility: OTHER | Age: 52
End: 2017-04-13
Payer: COMMERCIAL

## 2017-04-13 DIAGNOSIS — I26.99 ACUTE PULMONARY EMBOLISM (H): ICD-10-CM

## 2017-04-13 DIAGNOSIS — I26.99 OTHER ACUTE PULMONARY EMBOLISM WITHOUT ACUTE COR PULMONALE (H): ICD-10-CM

## 2017-04-13 DIAGNOSIS — Z79.01 LONG TERM CURRENT USE OF ANTICOAGULANT THERAPY: ICD-10-CM

## 2017-04-13 DIAGNOSIS — Z79.01 LONG-TERM (CURRENT) USE OF ANTICOAGULANTS: ICD-10-CM

## 2017-04-13 LAB — INR BLD: 2.6 (ref 0.86–1.14)

## 2017-04-13 PROCEDURE — 99207 ZZC NO CHARGE NURSE ONLY: CPT | Performed by: FAMILY MEDICINE

## 2017-04-13 PROCEDURE — 36416 COLLJ CAPILLARY BLOOD SPEC: CPT | Performed by: FAMILY MEDICINE

## 2017-04-13 PROCEDURE — 85610 PROTHROMBIN TIME: CPT | Mod: QW | Performed by: FAMILY MEDICINE

## 2017-04-13 NOTE — MR AVS SNAPSHOT
Parker Hunt   4/13/2017   Anticoagulation Therapy Visit    Description:  51 year old male   Provider:  Monica Odell MD   Department:  [facility]           INR as of 4/13/2017     Today's INR 2.6      Anticoagulation Summary as of 4/13/2017     INR goal 2.0-3.0   Today's INR 2.6   Full instructions 2 mg on Tue; 4 mg all other days   Next INR check 5/11/2017    Indications   Long-term (current) use of anticoagulants [Z79.01] [Z79.01]  Acute pulmonary embolism (H) [I26.99]         Contact Numbers     Clinic Number:         April 2017 Details    Sun Mon Tue Wed Thu Fri Sat           1                 2               3               4               5               6               7               8                 9               10               11               12               13      4 mg   See details      14      4 mg         15      4 mg           16      4 mg         17      4 mg         18      2 mg         19      4 mg         20      4 mg         21      4 mg         22      4 mg           23      4 mg         24      4 mg         25      2 mg         26      4 mg         27      4 mg         28      4 mg         29      4 mg           30      4 mg                Date Details   04/13 This INR check               How to take your warfarin dose     To take:  2 mg Take 0.5 of a 4 mg tablet.    To take:  4 mg Take 1 of the 4 mg tablets.           May 2017 Details    Sun Mon Tue Wed Thu Fri Sat      1      4 mg         2      2 mg         3      4 mg         4      4 mg         5      4 mg         6      4 mg           7      4 mg         8      4 mg         9      2 mg         10      4 mg         11            12               13                 14               15               16               17               18               19               20                 21               22               23               24               25               26               27                 28                29               30               31                   Date Details   No additional details    Date of next INR:  5/11/2017         How to take your warfarin dose     To take:  2 mg Take 0.5 of a 4 mg tablet.    To take:  4 mg Take 1 of the 4 mg tablets.

## 2017-04-13 NOTE — PROGRESS NOTES
ANTICOAGULATION FOLLOW-UP CLINIC VISIT    Patient Name:  Parker Hunt  Date:  4/13/2017  Contact Type:  Telephone    SUBJECTIVE:     Patient Findings     Positives No Problem Findings           OBJECTIVE    INR Point of Care   Date Value Ref Range Status   04/13/2017 2.6 (H) 0.86 - 1.14 Final     Comment:     This test is intended for monitoring Coumadin therapy.  Results are not   accurate   in patients with prolonged INR due to factor deficiency.         ASSESSMENT / PLAN  INR assessment THER    Recheck INR In: 4 WEEKS    INR Location Outside lab      Anticoagulation Summary as of 4/13/2017     INR goal 2.0-3.0   Today's INR 2.6   Maintenance plan 2 mg (4 mg x 0.5) on Tue; 4 mg (4 mg x 1) all other days   Full instructions 2 mg on Tue; 4 mg all other days   Weekly total 26 mg   No change documented Anushka Ward, DANTE   Plan last modified Anushka Ward RN (3/23/2017)   Next INR check 5/11/2017   Target end date     Indications   Long-term (current) use of anticoagulants [Z79.01] [Z79.01]  Acute pulmonary embolism (H) [I26.99]         Anticoagulation Episode Summary     INR check location     Preferred lab     Send INR reminders to [facility] POOL    Comments 4 mg tabs, PM dose      Anticoagulation Care Providers     Provider Role Specialty Phone number    Monica Odell MD [facility] [phone]            See the Encounter Report to view Anticoagulation Flowsheet and Dosing Calendar (Go to Encounters tab in chart review, and find the Anticoagulation Therapy Visit)    Dosage adjustment made based on physician directed care plan.    Anushka Ward RN

## 2017-05-31 ENCOUNTER — ANTICOAGULATION THERAPY VISIT (OUTPATIENT)
Dept: ANTICOAGULATION | Facility: OTHER | Age: 52
End: 2017-05-31
Payer: COMMERCIAL

## 2017-05-31 DIAGNOSIS — I26.99 OTHER ACUTE PULMONARY EMBOLISM WITHOUT ACUTE COR PULMONALE (H): ICD-10-CM

## 2017-05-31 DIAGNOSIS — Z79.01 LONG TERM CURRENT USE OF ANTICOAGULANT THERAPY: ICD-10-CM

## 2017-05-31 DIAGNOSIS — I26.99 ACUTE PULMONARY EMBOLISM (H): ICD-10-CM

## 2017-05-31 DIAGNOSIS — Z79.01 LONG-TERM (CURRENT) USE OF ANTICOAGULANTS: ICD-10-CM

## 2017-05-31 LAB — INR BLD: 2.1 (ref 0.86–1.14)

## 2017-05-31 PROCEDURE — 99207 ZZC NO CHARGE NURSE ONLY: CPT | Performed by: FAMILY MEDICINE

## 2017-05-31 PROCEDURE — 85610 PROTHROMBIN TIME: CPT | Mod: QW | Performed by: FAMILY MEDICINE

## 2017-05-31 PROCEDURE — 36416 COLLJ CAPILLARY BLOOD SPEC: CPT | Performed by: FAMILY MEDICINE

## 2017-05-31 NOTE — PROGRESS NOTES
ANTICOAGULATION FOLLOW-UP CLINIC VISIT    Patient Name:  Parker Hunt  Date:  5/31/2017  Contact Type:  Telephone/ VM left    SUBJECTIVE:     Patient Findings     Positives No Problem Findings           OBJECTIVE    INR Point of Care   Date Value Ref Range Status   05/31/2017 2.1 (H) 0.86 - 1.14 Final     Comment:     This test is intended for monitoring Coumadin therapy.  Results are not   accurate   in patients with prolonged INR due to factor deficiency.         ASSESSMENT / PLAN  INR assessment THER    Recheck INR In: 4 WEEKS    INR Location Outside lab      Anticoagulation Summary as of 5/31/2017     INR goal 2.0-3.0   Today's INR 2.1   Maintenance plan 2 mg (4 mg x 0.5) on Tue; 4 mg (4 mg x 1) all other days   Full instructions 2 mg on Tue; 4 mg all other days   Weekly total 26 mg   No change documented Charmaine Brennan, RN   Plan last modified Anushka Ward RN (3/23/2017)   Next INR check 6/28/2017   Target end date     Indications   Long-term (current) use of anticoagulants [Z79.01] [Z79.01]  Acute pulmonary embolism (H) [I26.99]         Anticoagulation Episode Summary     INR check location     Preferred lab     Send INR reminders to [facility] POOL    Comments 4 mg tabs, PM dose      Anticoagulation Care Providers     Provider Role Specialty Phone number    Monica Odell MD [facility] [phone]            See the Encounter Report to view Anticoagulation Flowsheet and Dosing Calendar (Go to Encounters tab in chart review, and find the Anticoagulation Therapy Visit)    Dosage adjustment made based on physician directed care plan.  I left a detailed voicemail with the orders reflected in flowsheet. I have also requested a call back if there have been any missed doses, concerns, illness, fever, or if there have been any changes in medications, activity level, or diet      Charmaine Brennan, DANTE

## 2017-05-31 NOTE — MR AVS SNAPSHOT
Parker Hunt   5/31/2017   Anticoagulation Therapy Visit    Description:  51 year old male   Provider:  Monica Odell MD   Department:  [facility]           INR as of 5/31/2017     Today's INR 2.1      Anticoagulation Summary as of 5/31/2017     INR goal 2.0-3.0   Today's INR 2.1   Full instructions 2 mg on Tue; 4 mg all other days   Next INR check 6/28/2017    Indications   Long-term (current) use of anticoagulants [Z79.01] [Z79.01]  Acute pulmonary embolism (H) [I26.99]         Contact Numbers     Clinic Number:         May 2017 Details    Sun Mon Tue Wed Thu Fri Sat      1               2               3               4               5               6                 7               8               9               10               11               12               13                 14               15               16               17               18               19               20                 21               22               23               24               25               26               27                 28               29               30               31      4 mg   See details          Date Details   05/31 This INR check               How to take your warfarin dose     To take:  4 mg Take 1 of the 4 mg tablets.           June 2017 Details    Sun Mon Tue Wed Thu Fri Sat         1      4 mg         2      4 mg         3      4 mg           4      4 mg         5      4 mg         6      2 mg         7      4 mg         8      4 mg         9      4 mg         10      4 mg           11      4 mg         12      4 mg         13      2 mg         14      4 mg         15      4 mg         16      4 mg         17      4 mg           18      4 mg         19      4 mg         20      2 mg         21      4 mg         22      4 mg         23      4 mg         24      4 mg           25      4 mg         26      4 mg         27      2 mg         28            29               30                  Date Details   No additional details    Date of next INR:  6/28/2017         How to take your warfarin dose     To take:  2 mg Take 0.5 of a 4 mg tablet.    To take:  4 mg Take 1 of the 4 mg tablets.

## 2017-06-12 ENCOUNTER — MYC MEDICAL ADVICE (OUTPATIENT)
Dept: FAMILY MEDICINE | Facility: OTHER | Age: 52
End: 2017-06-12

## 2017-06-12 DIAGNOSIS — I26.99 OTHER ACUTE PULMONARY EMBOLISM WITHOUT ACUTE COR PULMONALE (H): ICD-10-CM

## 2017-06-13 RX ORDER — WARFARIN SODIUM 4 MG/1
TABLET ORAL
Qty: 90 TABLET | Refills: 1 | Status: SHIPPED | OUTPATIENT
Start: 2017-06-13 | End: 2017-08-08

## 2017-06-24 ENCOUNTER — MYC MEDICAL ADVICE (OUTPATIENT)
Dept: FAMILY MEDICINE | Facility: OTHER | Age: 52
End: 2017-06-24

## 2017-06-24 DIAGNOSIS — I26.99 OTHER PULMONARY EMBOLISM WITHOUT ACUTE COR PULMONALE, UNSPECIFIED CHRONICITY (H): Primary | ICD-10-CM

## 2017-06-26 NOTE — TELEPHONE ENCOUNTER
I did put the orders in. Please advise  A visit 1 week after the labs are drawn to discuss results. Please have him wait a week after stopping coumadin before getting the labs done

## 2017-06-26 NOTE — TELEPHONE ENCOUNTER
Contacted pt with below.   He would like to know if he needs to wait the full 6 months or if he's ok to stop short of that.  Will route to RK to review.  Katia Oliveira, CMA

## 2017-08-03 DIAGNOSIS — I26.99 OTHER PULMONARY EMBOLISM WITHOUT ACUTE COR PULMONALE, UNSPECIFIED CHRONICITY (H): ICD-10-CM

## 2017-08-03 PROCEDURE — 85306 CLOT INHIBIT PROT S FREE: CPT | Performed by: FAMILY MEDICINE

## 2017-08-03 PROCEDURE — 81241 F5 GENE: CPT | Performed by: FAMILY MEDICINE

## 2017-08-03 PROCEDURE — 85303 CLOT INHIBIT PROT C ACTIVITY: CPT | Performed by: FAMILY MEDICINE

## 2017-08-03 PROCEDURE — 86147 CARDIOLIPIN ANTIBODY EA IG: CPT | Performed by: FAMILY MEDICINE

## 2017-08-03 PROCEDURE — 36415 COLL VENOUS BLD VENIPUNCTURE: CPT | Performed by: FAMILY MEDICINE

## 2017-08-03 PROCEDURE — 81240 F2 GENE: CPT | Performed by: FAMILY MEDICINE

## 2017-08-03 PROCEDURE — 85300 ANTITHROMBIN III ACTIVITY: CPT | Performed by: FAMILY MEDICINE

## 2017-08-03 PROCEDURE — 85220 BLOOC CLOT FACTOR V TEST: CPT | Performed by: FAMILY MEDICINE

## 2017-08-03 PROCEDURE — 86038 ANTINUCLEAR ANTIBODIES: CPT | Performed by: FAMILY MEDICINE

## 2017-08-03 PROCEDURE — 00000167 ZZHCL STATISTIC INR NC: Performed by: FAMILY MEDICINE

## 2017-08-04 LAB
ANA SER QL IA: NORMAL
AT III ACT/NOR PPP CHRO: 91 % (ref 85–135)
CARDIOLIPIN ANTIBODY IGG: NORMAL GPL-U/ML (ref 0–19.9)
CARDIOLIPIN ANTIBODY IGM: 1.8 MPL-U/ML (ref 0–19.9)
FACT V ACT/NOR PPP: 112 % (ref 60–140)

## 2017-08-07 ENCOUNTER — MYC MEDICAL ADVICE (OUTPATIENT)
Dept: FAMILY MEDICINE | Facility: OTHER | Age: 52
End: 2017-08-07

## 2017-08-07 NOTE — TELEPHONE ENCOUNTER
Parker Hunt is a 51 year old male who calls with UTI.    NURSING ASSESSMENT:  Description:  Having some cloudy urine, urinating more frequently at night. Urine feels weird but is not burning. Urgency with scant urine. Started dieting about a month ago. Drinking more water than his normal with this diet. Denies fevers, pain, inability to urinate, back pain, abdominal pain.   Onset/duration:  2-3 weeks  Pain scale (0-10)   0/10  Last exam/Treatment:  03/27/2017  Allergies:   Allergies   Allergen Reactions     No Known Drug Allergies      NURSING PLAN: Nursing advice to patient to be seen within 24 hours, declined appointment for [facility] today    RECOMMENDED DISPOSITION:  See in 24 hours - scheduled for first available that worked with his schedule  Will comply with recommendation: Yes  - declined available appointment in [facility] for today  Next 5 appointments (look out 90 days)     Aug 08, 2017  3:40 PM CDT   [facility] with [facility] MD   [facility]    [address] [place] [id number]   [phone]            Aug 17, 2017  4:20 PM CDT   Germant Kevin with Monica Odell MD   [facility]    [address] [address]   [phone]              If further questions/concerns or if symptoms do not improve, worsen or new symptoms develop, call your PCP or [facility] as soon as possible.    NOTES:  Disposition was determined by the first positive assessment question, therefore all previous assessment questions were negative    Guideline used:  Telephone Triage Protocols for Nurses, Fifth Edition, Stacie Disla  Urination, painful  Nursing Judgment    Hermila Stahl, RN, BSN

## 2017-08-08 ENCOUNTER — OFFICE VISIT (OUTPATIENT)
Dept: FAMILY MEDICINE | Facility: OTHER | Age: 52
End: 2017-08-08
Payer: COMMERCIAL

## 2017-08-08 VITALS
TEMPERATURE: 98.1 F | HEART RATE: 76 BPM | WEIGHT: 296 LBS | BODY MASS INDEX: 37.19 KG/M2 | SYSTOLIC BLOOD PRESSURE: 130 MMHG | RESPIRATION RATE: 16 BRPM | OXYGEN SATURATION: 98 % | DIASTOLIC BLOOD PRESSURE: 84 MMHG

## 2017-08-08 DIAGNOSIS — R30.0 DYSURIA: Primary | ICD-10-CM

## 2017-08-08 LAB
ALBUMIN UR-MCNC: NEGATIVE MG/DL
APPEARANCE UR: CLEAR
BACTERIA #/AREA URNS HPF: ABNORMAL /HPF
BILIRUB UR QL STRIP: NEGATIVE
COLOR UR AUTO: YELLOW
COPATH REPORT: NORMAL
GLUCOSE UR STRIP-MCNC: NEGATIVE MG/DL
HGB UR QL STRIP: NEGATIVE
KETONES UR STRIP-MCNC: NEGATIVE MG/DL
LEUKOCYTE ESTERASE UR QL STRIP: ABNORMAL
NITRATE UR QL: NEGATIVE
PH UR STRIP: 7 PH (ref 5–7)
RBC #/AREA URNS AUTO: ABNORMAL /HPF (ref 0–2)
SP GR UR STRIP: 1.01 (ref 1–1.03)
URN SPEC COLLECT METH UR: ABNORMAL
UROBILINOGEN UR STRIP-ACNC: 0.2 EU/DL (ref 0.2–1)
WBC #/AREA URNS AUTO: ABNORMAL /HPF (ref 0–2)

## 2017-08-08 PROCEDURE — 81001 URINALYSIS AUTO W/SCOPE: CPT | Performed by: FAMILY MEDICINE

## 2017-08-08 PROCEDURE — 99213 OFFICE O/P EST LOW 20 MIN: CPT | Performed by: FAMILY MEDICINE

## 2017-08-08 RX ORDER — CIPROFLOXACIN 500 MG/1
500 TABLET, FILM COATED ORAL 2 TIMES DAILY
Qty: 20 TABLET | Refills: 0 | Status: SHIPPED | OUTPATIENT
Start: 2017-08-08 | End: 2018-02-01

## 2017-08-08 ASSESSMENT — PAIN SCALES - GENERAL: PAINLEVEL: NO PAIN (0)

## 2017-08-08 NOTE — NURSING NOTE
"Chief Complaint   Patient presents with     UTI     RECHECK     Blood clot      Panel Management     lipid screening, BP range on problem list, ACT        Initial /84  Pulse 76  Temp 98.1  F (36.7  C) (Oral)  Resp 16  Wt 296 lb (134.3 kg)  SpO2 98%  BMI 37.19 kg/m2 Estimated body mass index is 37.19 kg/(m^2) as calculated from the following:    Height as of 2/14/17: 6' 2.8\" (1.9 m).    Weight as of this encounter: 296 lb (134.3 kg).  Medication Reconciliation: maria teresa Martinez      "

## 2017-08-08 NOTE — PROGRESS NOTES
SUBJECTIVE:                                                    Parker Hunt is a 51 year old male who presents to clinic today for the following health issues:      HPI  Pt is here to follow up on blood clot as well as a UTI   Genitourinary - Male  Onset: two weeks     Description:   Dysuria (painful urination): no   Hematuria (blood in urine): no   Frequency: YES- at night could be every 10 minutes   Are you urinating at night : YES  Hesitancy (delay in urine): no   Retention (unable to empty): YES- Only at night   Decrease in urinary flow: only at night   Incontinence: no     Progression of Symptoms:  same    Accompanying Signs & Symptoms:  Fever: no   Back/Flank pain: no   Urethral discharge: no   Testicle lumps/masses/pain: no   Nausea and/or vomiting: no   Abdominal pain: no     History:   History of frequent UTI's: no   History of kidney stones: no   History of hernias: no   Personal or Family history of Prostate problems: no  Sexually active: YES    Precipitating factors:   If drinking a lot of water     Alleviating factors:  Just during the day       Problem list and histories reviewed & adjusted, as indicated.  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     Atypical chest pain     Positive D dimer     Thyroid nodules     Hyperlipidemia LDL goal <160     Hypertension     History of thyroid cancer     Moderate intermittent asthma     Acute pulmonary embolism (H)     Hypoxia     Moderate persistent asthma with acute exacerbation     Acute sinusitis     Obesity     Long-term (current) use of anticoagulants [Z79.01]     Past Surgical History:   Procedure Laterality Date     COLONOSCOPY  3/21/2014    Procedure: COLONOSCOPY;  colonoscopy;  Surgeon: Sarbjit Sy MD;  Location: [facility]     NO HISTORY OF SURGERY       THYROIDECTOMY  4/29/2011     Procedure:THYROIDECTOMY; Jordan Thyroidectomy, Possible Total Thyroidectomy; Surgeon:JOSHUA CHAPPELL; Location:[facility]       Social History   Substance Use Topics     Smoking status: Never Smoker     Smokeless tobacco: Never Used     Alcohol use Yes      Comment: 2-3 beers/month     Family History   Problem Relation Age of Onset     Respiratory Brother      asthma     C.A.D. Maternal Grandfather      in 60s had heart issues         Current Outpatient Prescriptions   Medication Sig Dispense Refill     ciprofloxacin (CIPRO) 500 MG tablet Take 1 tablet (500 mg) by mouth 2 times daily 20 tablet 0     lisinopril (PRINIVIL/ZESTRIL) 10 MG tablet Take 1 tablet (10 mg) by mouth daily 90 tablet 2     beclomethasone (QVAR) 80 MCG/ACT Inhaler Inhale 2 puffs into the lungs 2 times daily 1 Inhaler 1     albuterol (VENTOLIN HFA) 108 (90 BASE) MCG/ACT Inhaler USE 2 PUFFS EVERY SIX HOURS AS NEEDED FOR SHORTNESS OF BREATH AND DIFFICULTY BREATHING 18 g 3     Allergies   Allergen Reactions     No Known Drug Allergies      BP Readings from Last 3 Encounters:   08/08/17 130/84   03/27/17 130/84   02/14/17 130/82    Wt Readings from Last 3 Encounters:   08/08/17 296 lb (134.3 kg)   03/27/17 (!) 308 lb (139.7 kg)   02/14/17 298 lb (135.2 kg)                  Labs reviewed in EPIC        ROS:  Constitutional, HEENT, cardiovascular, pulmonary, GI, , musculoskeletal, neuro, skin, endocrine and psych systems are negative, except as otherwise noted.      OBJECTIVE:   /84  Pulse 76  Temp 98.1  F (36.7  C) (Oral)  Resp 16  Wt 296 lb (134.3 kg)  SpO2 98%  BMI 37.19 kg/m2  Body mass index is 37.19 kg/(m^2).   Physical Exam   Constitutional: He is oriented to person, place, and time. He appears well-developed and well-nourished.   HENT:   Head: Normocephalic and atraumatic.   Eyes: EOM are normal.   Neck: Neck supple.   Cardiovascular: Normal rate, regular rhythm and normal heart sounds.    Pulmonary/Chest: Effort normal and breath  sounds normal.   Abdominal:   No CVA tenderness   Musculoskeletal: Normal range of motion.   Neurological: He is alert and oriented to person, place, and time.   Psychiatric: He has a normal mood and affect.         Diagnostic Test Results:  none     ASSESSMENT/PLAN:     Problem List Items Addressed This Visit     None      Visit Diagnoses     Dysuria    -  Primary    Relevant Medications    ciprofloxacin (CIPRO) 500 MG tablet    Other Relevant Orders    UA with Microscopic (Completed)       unclear precipitating facors- report some polyuria. Will get labs to r/o diabetes as a contributing factor if has a recurrence  abx as prescribed  Discussed home care  Reportable signs and symptoms discussed  RTC if symptoms persist or fail to improve      Monica Odell MD  [facility]

## 2017-08-08 NOTE — MR AVS SNAPSHOT
After Visit Summary   8/8/2017    Parker Hunt    MRN: [id number]           Patient Information     Date Of Birth          1965        Visit Information        Provider Department      8/8/2017 3:40 PM Monica Odell MD [facility]        Today's Diagnoses     Dysuria    -  1       Follow-ups after your visit        Who to contact     If you have questions or need follow up information about today's clinic visit or your schedule please contact [facility] directly at [phone].  Normal or non-critical lab and imaging results will be communicated to you by Appcel[employer]ator[employer], letter or phone within 4 business days after the clinic has received the results. If you do not hear from us within 7 days, please contact the clinic through Diaphonic[employer] or phone. If you have a critical or abnormal lab result, we will notify you by phone as soon as possible.  Submit refill requests through "[employer]" or call your pharmacy and they will forward the refill request to us. Please allow 3 business days for your refill to be completed.          Additional Information About Your Visit        MyChart Information     "[employer] gives you secure access to your electronic health record. If you see a primary care provider, you can also send messages to your care team and make appointments. If you have questions, please call your primary care clinic.  If you do not have a primary care provider, please call [phone] and they will assist you.        Care EveryWhere ID     This is your Care EveryWhere ID. This could be used by other organizations to access your [facility] medical records  [id number]        Your Vitals Were     Pulse Temperature Respirations Pulse Oximetry BMI (Body Mass Index)       76 98.1  F (36.7  C) (Oral) 16 98% 37.19 kg/m2        Blood Pressure from Last 3 Encounters:   08/08/17 130/84   03/27/17 130/84   02/14/17 130/82    Weight from Last 3 Encounters:   08/08/17 296 lb (134.3  kg)   03/27/17 (!) 308 lb (139.7 kg)   02/14/17 298 lb (135.2 kg)              We Performed the Following     UA with Microscopic          Today's Medication Changes          These changes are accurate as of: 8/8/17  4:05 PM.  If you have any questions, ask your nurse or doctor.               Start taking these medicines.        Dose/Directions    ciprofloxacin 500 MG tablet   Commonly known as:  CIPRO   Used for:  Dysuria   Started by:  Monica Odell MD        Dose:  500 mg   Take 1 tablet (500 mg) by mouth 2 times daily   Quantity:  20 tablet   Refills:  0            Where to get your medicines      These medications were sent to [facility], [place] - [place], [place] - [address]  [address], [facility] [address]     Phone:  [phone]     ciprofloxacin 500 MG tablet                Primary Care Provider Office Phone # Fax #    Monica Odell [phone]       [address]  [facility] [address]        Equal Access to Services     [facility] AH: Hadii yo sanchez hadasho Soomaali, waaxda luqadaha, qaybta kaalmada adeegyada, waxay mary lou navas . So [facility] [phone].    ATENCIÓN: Si habla español, tiene a brantley disposición servicios gratuitos de asistencia lingüística. [facility] [phone].    We comply with applicable federal civil rights laws and [place] laws. We do not discriminate on the basis of race, color, national origin, age, disability sex, sexual orientation or gender identity.            Thank you!     Thank you for choosing [facility]  for your care. Our goal is always to provide you with excellent care. Hearing back from our patients is one way we can continue to improve our services. Please take a few minutes to complete the written survey that you may receive in the mail after your visit with us. Thank you!             Your Updated Medication List - Protect others around you: Learn how to safely use, store and throw  away your medicines at www.disposemymeds.org.          This list is accurate as of: 8/8/17  4:05 PM.  Always use your most recent med list.                   Brand Name Dispense Instructions for use Diagnosis    albuterol 108 (90 BASE) MCG/ACT Inhaler    VENTOLIN HFA    18 g    USE 2 PUFFS EVERY SIX HOURS AS NEEDED FOR SHORTNESS OF BREATH AND DIFFICULTY BREATHING    Wheezing       beclomethasone 80 MCG/ACT Inhaler    QVAR    1 Inhaler    Inhale 2 puffs into the lungs 2 times daily    Uncomplicated asthma, unspecified asthma severity       ciprofloxacin 500 MG tablet    CIPRO    20 tablet    Take 1 tablet (500 mg) by mouth 2 times daily    Dysuria       lisinopril 10 MG tablet    PRINIVIL/ZESTRIL    90 tablet    Take 1 tablet (10 mg) by mouth daily    Essential hypertension

## 2017-08-09 LAB
PROT C ACT/NOR PPP CHRO: 114 % (ref 70–170)
PROT S FREE AG ACT/NOR PPP IA: 93 % (ref 70–148)

## 2017-08-09 ASSESSMENT — ASTHMA QUESTIONNAIRES: ACT_TOTALSCORE: 23

## 2017-08-22 ENCOUNTER — TELEPHONE (OUTPATIENT)
Dept: ANTICOAGULATION | Facility: OTHER | Age: 52
End: 2017-08-22

## 2017-08-22 NOTE — TELEPHONE ENCOUNTER
Telephone call attempted to pt, no answer.  Left message for pt to call clinic back.  If pt calls back, please assist in scheduling INR.    Alfonso Buitrago RN, BSN

## 2017-08-29 ENCOUNTER — TELEPHONE (OUTPATIENT)
Dept: FAMILY MEDICINE | Facility: OTHER | Age: 52
End: 2017-08-29

## 2017-08-29 DIAGNOSIS — J45.909 UNCOMPLICATED ASTHMA, UNSPECIFIED ASTHMA SEVERITY: ICD-10-CM

## 2017-08-29 NOTE — TELEPHONE ENCOUNTER
Routing refill request to provider for review/approval because:  Appears to be a break in medication - should have been out around 5/27/17    Mary Shannon, RN, BSN

## 2017-08-29 NOTE — TELEPHONE ENCOUNTER
Reason for Call:  Medication or medication refill:    Do you use a [facility]?  Name of the pharmacy and phone number for the current request:  [facility] - [phone]    Name of the medication requested: beclomethasone (QVAR) 80 MCG/ACT Inhaler    Other request: patient is out of this medication.  Pharmacy calling    Can we leave a detailed message on this number? YES    Phone number patient can be reached at: Home number on file [phone] (home)    Best Time: any    Call taken on 8/29/2017 at 4:13 PM by Katia Cooper

## 2017-11-02 DIAGNOSIS — J45.50 SEVERE PERSISTENT ASTHMA WITHOUT COMPLICATION (H): Primary | ICD-10-CM

## 2017-11-02 NOTE — TELEPHONE ENCOUNTER
Pharmacy notes, only one refill left and insurance only covers 90 days.  Please send new script with more refills.

## 2017-11-29 ENCOUNTER — TELEPHONE (OUTPATIENT)
Dept: FAMILY MEDICINE | Facility: OTHER | Age: 52
End: 2017-11-29

## 2017-11-29 DIAGNOSIS — I10 ESSENTIAL HYPERTENSION: ICD-10-CM

## 2017-11-29 NOTE — TELEPHONE ENCOUNTER
Message from pharmacy lisinopril-     lisinopril (PRINIVIL/ZESTRIL) 10 MG tablet 90 tablet 2 4/12/2017  No   Sig: Take 1 tablet (10 mg) by mouth daily     Patient only allows qty of 30. If you could refill 30 with additional refills if appropriate.

## 2017-11-30 RX ORDER — LISINOPRIL 10 MG/1
10 TABLET ORAL DAILY
Qty: 30 TABLET | Refills: 2 | Status: SHIPPED | OUTPATIENT
Start: 2017-11-30 | End: 2018-02-01

## 2017-11-30 NOTE — TELEPHONE ENCOUNTER
I spoke with pharmacy.   The refills ran out on his old script - they are requesting a new script be sent in.     Prescription approved per [facility] Refill Protocol.    Mary Shannon, RN, BSN

## 2018-01-30 ENCOUNTER — TELEPHONE (OUTPATIENT)
Dept: FAMILY MEDICINE | Facility: OTHER | Age: 53
End: 2018-01-30

## 2018-01-30 NOTE — TELEPHONE ENCOUNTER
Reason for Call:  Form, our goal is to have forms completed with 72 hours, however, some forms may require a visit or additional information.    Type of letter, form or note:  medical    Who is the form from?: josee (if other please explain)    Where did the form come from: form was faxed in    What clinic location was the form placed at?: [facility] - [phone]    Where the form was placed: 's Box    What number is listed as a contact on the form?:  [phone]       Additional comments: sign fax back    Call taken on 1/30/2018 at 4:51 PM by Gisele Brown

## 2018-02-01 ENCOUNTER — OFFICE VISIT (OUTPATIENT)
Dept: FAMILY MEDICINE | Facility: OTHER | Age: 53
End: 2018-02-01
Payer: COMMERCIAL

## 2018-02-01 ENCOUNTER — MEDICAL CORRESPONDENCE (OUTPATIENT)
Dept: HEALTH INFORMATION MANAGEMENT | Facility: CLINIC | Age: 53
End: 2018-02-01

## 2018-02-01 VITALS
BODY MASS INDEX: 33.22 KG/M2 | HEIGHT: 74 IN | WEIGHT: 258.9 LBS | RESPIRATION RATE: 16 BRPM | DIASTOLIC BLOOD PRESSURE: 90 MMHG | TEMPERATURE: 98 F | SYSTOLIC BLOOD PRESSURE: 144 MMHG | HEART RATE: 72 BPM

## 2018-02-01 DIAGNOSIS — B02.9 HERPES ZOSTER WITHOUT COMPLICATION: Primary | ICD-10-CM

## 2018-02-01 DIAGNOSIS — I10 HTN, GOAL BELOW 140/90: ICD-10-CM

## 2018-02-01 PROCEDURE — 99213 OFFICE O/P EST LOW 20 MIN: CPT | Performed by: PHYSICIAN ASSISTANT

## 2018-02-01 RX ORDER — VALACYCLOVIR HYDROCHLORIDE 1 G/1
1000 TABLET, FILM COATED ORAL 3 TIMES DAILY
Qty: 21 TABLET | Refills: 0 | Status: SHIPPED | OUTPATIENT
Start: 2018-02-01 | End: 2019-08-08

## 2018-02-01 RX ORDER — LISINOPRIL 10 MG/1
20 TABLET ORAL DAILY
Qty: 60 TABLET | Refills: 0 | Status: SHIPPED | OUTPATIENT
Start: 2018-02-01 | End: 2018-02-28

## 2018-02-01 ASSESSMENT — PAIN SCALES - GENERAL: PAINLEVEL: SEVERE PAIN (6)

## 2018-02-01 NOTE — MR AVS SNAPSHOT
After Visit Summary   2/1/2018    Parker Hunt    MRN: [id number]           Patient Information     Date Of Birth          1965        Visit Information        Provider Department      2/1/2018 10:40 AM Shaan Webster PA-C [facility]        Today's Diagnoses     Herpes zoster without complication    -  1    HTN, goal below 140/90          Care Instructions                 Shingles (Herpes Zoster)  What is shingles?   Shingles is an infection caused by the same virus that causes chickenpox. This virus is called varicella zoster. You cannot develop shingles unless you have had a previous infection of chickenpox (usually as a child).   Shingles is also called herpes zoster. This infection is most common in people over 50 years old, but young people can have it as well.   How does it occur?   If you have had chickenpox, you are at risk for later developing shingles. After you recover from chickenpox, the chickenpox virus stays in your body. It moves to the roots of your nerve cells (near the spinal cord) and becomes inactive (dormant). Later, if the virus becomes active again, shingles is the name given to the symptoms it causes.   What exactly causes the virus to become active is not known. A weakened immune system seems to allow reactivation of the virus. This may occur with normal aging, immune-suppressing medicines, or another illness, or after major surgery. It can also happen as a complication of cancer or AIDS or treatment of these illnesses. Chronic use of steroid drugs may trigger shingles. The virus may also become active again after the skin is injured or sunburned. Emotional stress seems to be a common trigger as well.   What are the symptoms?   The first sign of shingles is often burning, sharp pain, tingling, or numbness in your skin on one side of your body or face. The most common site is the back or upper abdomen. You may have severe itching or aching. You also may  feel tired and ill with fever, chills, headache, and upset stomach or belly pain.   One to 14 days after you start feeling pain, you will notice a rash of small blisters on reddened skin. Within a few days after they appear, the blisters will turn yellow, then dry and crust over. Over the next 2 weeks the crusts drop off, and the skin continues to heal over the next several days to weeks.   Because shingles usually follows nerve paths, the blisters are usually found in a line, often extending from the back or side around to the belly. The blisters are almost always on just one side of the body. Shingles usually doesn't cross the midline of the body. The rash also may appear on one side of your face or scalp. The painful rash may be in the area of your ear or eye. When shingles occurs on the head or scalp, symptoms can include headaches and weakness of one side of the face, which causes that side of the face to look droopy. The symptoms usually go away eventually, but it may take many months.   In some cases the pain can last for weeks, months, or years, long after the rash heals. This is called postherpetic neuralgia.   Is shingles contagious?   You cannot get shingles from someone else. However, if you have never had chickenpox, you may get chickenpox from close contact with someone who has shingles because the blisters contain chickenpox virus.   If you have shingles, make sure that anyone who has not had chickenpox or the chickenpox shot does not come into contact with your blisters until the blisters are completely dry. Once your blisters are crusted over, they are no longer contagious.   How is it diagnosed?   Your healthcare provider will ask about your medical history and symptoms and will examine you. The diagnosis is usually obvious from the appearance of the skin. To confirm the diagnosis, your provider may order lab tests to look for the virus in fluid from a blister.   How is it treated?   It is best to  start treatment as soon as possible after you notice the rash. See your healthcare provider to discuss treatment with antiviral medicine, such as acyclovir. This medicine is most effective if you start taking it within the first 3 days of the rash. Antiviral medicine may speed your recovery and lessen the chance that the pain will last for a long time.   Your provider may also recommend or prescribe:   medicine for pain   antibacterial salves or lotions to help prevent bacterial infection of the blisters   corticosteroids (if you are over 50).   How long will the effects last?   The rash from shingles will heal in 1 to 3 weeks and the pain or irritation will usually go away in 3 to 5 weeks. When shingles occurs on the head or scalp, the symptoms usually go away eventually, but it may take many months.   If the virus damages a nerve, you may have pain, numbness, or tingling for months or even years after the rash is healed. This is called postherpetic neuralgia. This chronic condition is most likely to occur after a shingles outbreak in people over 50 years old. Taking antiviral medicine as soon as the shingles is diagnosed may help prevent this problem.   How can I take care of myself?   Take a pain-relief medicine such as acetaminophen. Take other medicine as prescribed by your healthcare provider.   Put cool, moist washcloths on the rash.   Rest in bed during the early stages if you have fever and other symptoms.   Try not to let clothing or bed linens rub against the rash and irritate it.   Call your healthcare provider right away if:   You develop worsening pain or fever.   You develop a severe headache, stiff neck, hearing loss, or changes in your ability to think.   The blisters show signs of bacterial infection, such as increasing pain or redness, or milky yellow drainage from the blister sites.   The blisters are close to the eyes or you have pain in your eyes or trouble seeing.   You have trouble walking.    You have trouble breathing or a severe cough.   You have a fever higher than 101.5? F (38.6? C.)   You have a rash involving your eye or difficulty looking at bright light.   The blisters appear infected. Signs or symptoms of infection include:   Your skin is becoming redder or more painful.   You have red streaks from the blisters going toward your heart.   The blister area gets very warm to touch.   Pus or other fluid starts leaking from the blisters.   You have chills, nausea, vomiting, or muscle aches.   How can I help prevent shingles?   If you have never had chickenpox, you can get a shot to help prevent infection with the chickenpox virus.   If you have had chickenpox, a vaccine, called Zostavax, is available for people 60 years of age and older. The vaccine can help prevent or lessen the symptoms of shingles. It cannot be used to treat shingles once you have it.   You can protect your immune system and lessen your chances of getting shingles by trying to keep stress under control, exercising regularly, and eating a healthy diet.     Published by [employer].  This content is reviewed periodically and is subject to change as new health information becomes available. The information is intended to inform and educate and is not a replacement for medical evaluation, advice, diagnosis or treatment by a healthcare professional.   Developed by [employer].   ? 2010 [employer] and/or its affiliates. All Rights Reserved.   Copyright   Clinical Reference Systems 2011                Follow-ups after your visit        Who to contact     If you have questions or need follow up information about today's clinic visit or your schedule please contact [facility] directly at [phone].  Normal or non-critical lab and imaging results will be communicated to you by MyChart, letter or phone within 4 business days after the clinic has received the results. If you do not hear from us within 7 days, please  "contact the clinic through [employer] or phone. If you have a critical or abnormal lab result, we will notify you by phone as soon as possible.  Submit refill requests through [employer] or call your pharmacy and they will forward the refill request to us. Please allow 3 business days for your refill to be completed.          Additional Information About Your Visit        [employer] Information     [employer] gives you secure access to your electronic health record. If you see a primary care provider, you can also send messages to your care team and make appointments. If you have questions, please call your primary care clinic.  If you do not have a primary care provider, please call [phone] and they will assist you.        Care EveryWhere ID     This is your Care EveryWhere ID. This could be used by other organizations to access your [facility] medical records  [id number]        Your Vitals Were     Pulse Temperature Respirations Height BMI (Body Mass Index)       72 98  F (36.7  C) (Temporal) 16 6' 2.45\" (1.891 m) 32.84 kg/m2        Blood Pressure from Last 3 Encounters:   02/01/18 144/90   08/08/17 130/84   03/27/17 130/84    Weight from Last 3 Encounters:   02/01/18 258 lb 14.4 oz (117.4 kg)   08/08/17 296 lb (134.3 kg)   03/27/17 (!) 308 lb (139.7 kg)              Today, you had the following     No orders found for display         Today's Medication Changes          These changes are accurate as of 2/1/18 10:49 AM.  If you have any questions, ask your nurse or doctor.               Start taking these medicines.        Dose/Directions    valACYclovir 1000 mg tablet   Commonly known as:  VALTREX   Used for:  Herpes zoster without complication   Started by:  Shaan Webster PA-C        Dose:  1000 mg   Take 1 tablet (1,000 mg) by mouth 3 times daily   Quantity:  21 tablet   Refills:  0         These medicines have changed or have updated prescriptions.        Dose/Directions    lisinopril 10 MG tablet   Commonly known " as:  PRINIVIL/ZESTRIL   This may have changed:  how much to take   Used for:  HTN, goal below 140/90   Changed by:  Shaan Webster PA-C        Dose:  20 mg   Take 2 tablets (20 mg) by mouth daily   Quantity:  60 tablet   Refills:  0            Where to get your medicines      These medications were sent to [facility] - [place] - [address]   [address] [place] [address]     Phone:  [phone]     lisinopril 10 MG tablet    valACYclovir 1000 mg tablet                Primary Care Provider Office Phone # Fax #    Monica Odell [phone]       [address]  [facility] [address]        Equal Access to Services     [facility] Hospital[facility] : Hadii yo sanchez hadasho Soomaali, waaxda luqadaha, qaybta kaalmada adeegyada, waxlola navas . So [facility] [phone].    ATENCIÓN: Si habla español, tiene a brantley disposición servicios gratuitos de asistencia lingüística. [facility] [phone].    We comply with applicable federal civil rights laws and [place] laws. We do not discriminate on the basis of race, color, national origin, age, disability, sex, sexual orientation, or gender identity.            Thank you!     Thank you for choosing [facility]  for your care. Our goal is always to provide you with excellent care. Hearing back from our patients is one way we can continue to improve our services. Please take a few minutes to complete the written survey that you may receive in the mail after your visit with us. Thank you!             Your Updated Medication List - Protect others around you: Learn how to safely use, store and throw away your medicines at www.disposemymeds.org.          This list is accurate as of 2/1/18 10:49 AM.  Always use your most recent med list.                   Brand Name Dispense Instructions for use Diagnosis    albuterol 108 (90 BASE) MCG/ACT Inhaler    VENTOLIN HFA    18 g    USE 2 PUFFS EVERY SIX HOURS  AS NEEDED FOR SHORTNESS OF BREATH AND DIFFICULTY BREATHING    Wheezing       beclomethasone 80 MCG/ACT Inhaler    QVAR    3 Inhaler    Inhale 2 puffs into the lungs 2 times daily    Severe persistent asthma without complication       lisinopril 10 MG tablet    PRINIVIL/ZESTRIL    60 tablet    Take 2 tablets (20 mg) by mouth daily    HTN, goal below 140/90       valACYclovir 1000 mg tablet    VALTREX    21 tablet    Take 1 tablet (1,000 mg) by mouth 3 times daily    Herpes zoster without complication

## 2018-02-01 NOTE — NURSING NOTE
"Chief Complaint   Patient presents with     Derm Problem       Initial /90 (Cuff Size: Adult Large)  Pulse 72  Temp 98  F (36.7  C) (Temporal)  Resp 16  Ht 6' 2.45\" (1.891 m)  Wt 258 lb 14.4 oz (117.4 kg)  BMI 32.84 kg/m2 Estimated body mass index is 32.84 kg/(m^2) as calculated from the following:    Height as of this encounter: 6' 2.45\" (1.891 m).    Weight as of this encounter: 258 lb 14.4 oz (117.4 kg).  Medication Reconciliation: complete  "

## 2018-02-01 NOTE — PATIENT INSTRUCTIONS
Shingles (Herpes Zoster)  What is shingles?   Shingles is an infection caused by the same virus that causes chickenpox. This virus is called varicella zoster. You cannot develop shingles unless you have had a previous infection of chickenpox (usually as a child).   Shingles is also called herpes zoster. This infection is most common in people over 50 years old, but young people can have it as well.   How does it occur?   If you have had chickenpox, you are at risk for later developing shingles. After you recover from chickenpox, the chickenpox virus stays in your body. It moves to the roots of your nerve cells (near the spinal cord) and becomes inactive (dormant). Later, if the virus becomes active again, shingles is the name given to the symptoms it causes.   What exactly causes the virus to become active is not known. A weakened immune system seems to allow reactivation of the virus. This may occur with normal aging, immune-suppressing medicines, or another illness, or after major surgery. It can also happen as a complication of cancer or AIDS or treatment of these illnesses. Chronic use of steroid drugs may trigger shingles. The virus may also become active again after the skin is injured or sunburned. Emotional stress seems to be a common trigger as well.   What are the symptoms?   The first sign of shingles is often burning, sharp pain, tingling, or numbness in your skin on one side of your body or face. The most common site is the back or upper abdomen. You may have severe itching or aching. You also may feel tired and ill with fever, chills, headache, and upset stomach or belly pain.   One to 14 days after you start feeling pain, you will notice a rash of small blisters on reddened skin. Within a few days after they appear, the blisters will turn yellow, then dry and crust over. Over the next 2 weeks the crusts drop off, and the skin continues to heal over the next several days to weeks.    Because shingles usually follows nerve paths, the blisters are usually found in a line, often extending from the back or side around to the belly. The blisters are almost always on just one side of the body. Shingles usually doesn't cross the midline of the body. The rash also may appear on one side of your face or scalp. The painful rash may be in the area of your ear or eye. When shingles occurs on the head or scalp, symptoms can include headaches and weakness of one side of the face, which causes that side of the face to look droopy. The symptoms usually go away eventually, but it may take many months.   In some cases the pain can last for weeks, months, or years, long after the rash heals. This is called postherpetic neuralgia.   Is shingles contagious?   You cannot get shingles from someone else. However, if you have never had chickenpox, you may get chickenpox from close contact with someone who has shingles because the blisters contain chickenpox virus.   If you have shingles, make sure that anyone who has not had chickenpox or the chickenpox shot does not come into contact with your blisters until the blisters are completely dry. Once your blisters are crusted over, they are no longer contagious.   How is it diagnosed?   Your healthcare provider will ask about your medical history and symptoms and will examine you. The diagnosis is usually obvious from the appearance of the skin. To confirm the diagnosis, your provider may order lab tests to look for the virus in fluid from a blister.   How is it treated?   It is best to start treatment as soon as possible after you notice the rash. See your healthcare provider to discuss treatment with antiviral medicine, such as acyclovir. This medicine is most effective if you start taking it within the first 3 days of the rash. Antiviral medicine may speed your recovery and lessen the chance that the pain will last for a long time.   Your provider may also recommend or  prescribe:   medicine for pain   antibacterial salves or lotions to help prevent bacterial infection of the blisters   corticosteroids (if you are over 50).   How long will the effects last?   The rash from shingles will heal in 1 to 3 weeks and the pain or irritation will usually go away in 3 to 5 weeks. When shingles occurs on the head or scalp, the symptoms usually go away eventually, but it may take many months.   If the virus damages a nerve, you may have pain, numbness, or tingling for months or even years after the rash is healed. This is called postherpetic neuralgia. This chronic condition is most likely to occur after a shingles outbreak in people over 50 years old. Taking antiviral medicine as soon as the shingles is diagnosed may help prevent this problem.   How can I take care of myself?   Take a pain-relief medicine such as acetaminophen. Take other medicine as prescribed by your healthcare provider.   Put cool, moist washcloths on the rash.   Rest in bed during the early stages if you have fever and other symptoms.   Try not to let clothing or bed linens rub against the rash and irritate it.   Call your healthcare provider right away if:   You develop worsening pain or fever.   You develop a severe headache, stiff neck, hearing loss, or changes in your ability to think.   The blisters show signs of bacterial infection, such as increasing pain or redness, or milky yellow drainage from the blister sites.   The blisters are close to the eyes or you have pain in your eyes or trouble seeing.   You have trouble walking.   You have trouble breathing or a severe cough.   You have a fever higher than 101.5? F (38.6? C.)   You have a rash involving your eye or difficulty looking at bright light.   The blisters appear infected. Signs or symptoms of infection include:   Your skin is becoming redder or more painful.   You have red streaks from the blisters going toward your heart.   The blister area gets very warm  to touch.   Pus or other fluid starts leaking from the blisters.   You have chills, nausea, vomiting, or muscle aches.   How can I help prevent shingles?   If you have never had chickenpox, you can get a shot to help prevent infection with the chickenpox virus.   If you have had chickenpox, a vaccine, called Zostavax, is available for people 60 years of age and older. The vaccine can help prevent or lessen the symptoms of shingles. It cannot be used to treat shingles once you have it.   You can protect your immune system and lessen your chances of getting shingles by trying to keep stress under control, exercising regularly, and eating a healthy diet.     Published by [employer].  This content is reviewed periodically and is subject to change as new health information becomes available. The information is intended to inform and educate and is not a replacement for medical evaluation, advice, diagnosis or treatment by a healthcare professional.   Developed by [employer].   ? 2010 [facility] and/or its affiliates. All Rights Reserved.   Copyright   Clinical Reference Systems 2011

## 2018-02-01 NOTE — PROGRESS NOTES
SUBJECTIVE:   Parker Hunt is a 52 year old male who presents to clinic today for the following health issues:      HPI  Rash  Onset: 01/26    Description:   Location: Breastbone to back on right side  Character: blotchy, raised, painful, red  Itching (Pruritis): no     Progression of Symptoms:  worsening    Accompanying Signs & Symptoms:  Fever: no   Body aches or joint pain: no   Sore throat symptoms: no   Recent cold symptoms: no     History:   Previous similar rash: no     Precipitating factors:   Exposure to similar rash: no   New exposures: None   Recent travel: no     Alleviating factors:  Taking ibuprofen and tylenol for pain    Therapies Tried and outcome: Ibuprofen and tylenol  Problem list and histories reviewed & adjusted, as indicated.  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     Atypical chest pain     Positive D dimer     Thyroid nodules     Hyperlipidemia LDL goal <160     Hypertension     History of thyroid cancer     Moderate intermittent asthma     Acute pulmonary embolism (H)     Hypoxia     Moderate persistent asthma with acute exacerbation     Acute sinusitis     Obesity     Long-term (current) use of anticoagulants [Z79.01]     Herpes zoster without complication     Past Surgical History:   Procedure Laterality Date     COLONOSCOPY  3/21/2014    Procedure: COLONOSCOPY;  colonoscopy;  Surgeon: Sarbjit Sy MD;  Location: [facility]     NO HISTORY OF SURGERY       THYROIDECTOMY  4/29/2011    Procedure:THYROIDECTOMY; Jordan Thyroidectomy, Possible Total Thyroidectomy; Surgeon:JOSHUA CHAPPELL; Location: OR       Social History   Substance Use Topics     Smoking status: Never Smoker     Smokeless tobacco: Never Used     Alcohol use Yes      Comment: 2-3 beers/month     Family History   Problem Relation Age of Onset     Respiratory Brother  "     asthma     C.A.D. Maternal Grandfather      in 60s had heart issues         Current Outpatient Prescriptions   Medication Sig Dispense Refill     lisinopril (PRINIVIL/ZESTRIL) 10 MG tablet Take 2 tablets (20 mg) by mouth daily 60 tablet 0     valACYclovir (VALTREX) 1000 mg tablet Take 1 tablet (1,000 mg) by mouth 3 times daily 21 tablet 0     beclomethasone (QVAR) 80 MCG/ACT Inhaler Inhale 2 puffs into the lungs 2 times daily 3 Inhaler 1     albuterol (VENTOLIN HFA) 108 (90 BASE) MCG/ACT Inhaler USE 2 PUFFS EVERY SIX HOURS AS NEEDED FOR SHORTNESS OF BREATH AND DIFFICULTY BREATHING 18 g 3     [DISCONTINUED] lisinopril (PRINIVIL/ZESTRIL) 10 MG tablet Take 1 tablet (10 mg) by mouth daily 30 tablet 2     Allergies   Allergen Reactions     No Known Drug Allergies      BP Readings from Last 3 Encounters:   02/01/18 144/90   08/08/17 130/84   03/27/17 130/84    Wt Readings from Last 3 Encounters:   02/01/18 258 lb 14.4 oz (117.4 kg)   08/08/17 296 lb (134.3 kg)   03/27/17 (!) 308 lb (139.7 kg)                    ROS:  Constitutional, HEENT, cardiovascular, pulmonary, gi and gu systems are negative, except as otherwise noted.    OBJECTIVE:     /90 (Cuff Size: Adult Large)  Pulse 72  Temp 98  F (36.7  C) (Temporal)  Resp 16  Ht 6' 2.45\" (1.891 m)  Wt 258 lb 14.4 oz (117.4 kg)  BMI 32.84 kg/m2  Body mass index is 32.84 kg/(m^2).  GENERAL: healthy, alert and no distress  SKIN: An erythematous rash in a band type pattern is noted to the right axillary region and the anterior chest.  This does not cross the midline.  Upon closer examination I do find a couple of small vesicles.  He states the rash that started today.  He has been having the pain since Friday of last week.  This would seem to be very consistent with shingles.      ASSESSMENT/PLAN:     1. HTN, goal below 140/90  Blood pressure is elevated today he states this is his typical normal.  We will have him increase his dose of medication and recheck in 2 " weeks.  - lisinopril (PRINIVIL/ZESTRIL) 10 MG tablet; Take 2 tablets (20 mg) by mouth daily  Dispense: 60 tablet; Refill: 0    2. Herpes zoster without complication  Advised that this is most likely shingles and needs to be treated as noted below.  Follow-up in 2-3 weeks.  Did have a nicolas discussion around postherpetic neuralgia potential.  - valACYclovir (VALTREX) 1000 mg tablet; Take 1 tablet (1,000 mg) by mouth 3 times daily  Dispense: 21 tablet; Refill: 0    Follow-up in 2-3 weeks.    Shaan Lyles PA-C  [facility]

## 2018-02-02 ASSESSMENT — ASTHMA QUESTIONNAIRES: ACT_TOTALSCORE: 23

## 2018-02-07 ENCOUNTER — MYC MEDICAL ADVICE (OUTPATIENT)
Dept: FAMILY MEDICINE | Facility: OTHER | Age: 53
End: 2018-02-07

## 2018-02-07 NOTE — TELEPHONE ENCOUNTER
Flag for covering provider to review [employer] message.    Second message received: Sorry, I said that wrong ... I'm taking 1,200-2,000 mg Ibuprofen PER DAY (I take 400-800 mg 3x) and 3-4000 mg Acetaminophen PER DAY (650-1300 mg 3x)     Jimbo Erazo RN, BSN

## 2018-02-09 ENCOUNTER — TELEPHONE (OUTPATIENT)
Dept: FAMILY MEDICINE | Facility: OTHER | Age: 53
End: 2018-02-09

## 2018-02-09 DIAGNOSIS — Z20.828 EXPOSURE TO THE FLU: Primary | ICD-10-CM

## 2018-02-09 RX ORDER — OSELTAMIVIR PHOSPHATE 75 MG/1
75 CAPSULE ORAL DAILY
Qty: 10 CAPSULE | Refills: 0 | Status: SHIPPED | OUTPATIENT
Start: 2018-02-09 | End: 2018-07-06

## 2018-02-09 NOTE — TELEPHONE ENCOUNTER
Reason for call:  Patient reporting a symptom    Symptom or request: symptoms    Duration (how long have symptoms been present): n/a    Have you been treated for this before? No    Additional comments: patient's wife called this morning for flu sx for herself she was told to have her  call and talk to the nurse to get a prescription for Tomasa flu because he has asthma and also the shingles right now.     Phone Number patient can be reached at:  Home number on file [phone] (home) or Cell number on file:    Telephone Information:   Mobile [phone]       Best Time:  anytime    Can we leave a detailed message on this number:  YES    Call taken on 2/9/2018 at 9:23 AM by Sherrill Ghosh

## 2018-02-09 NOTE — TELEPHONE ENCOUNTER
Pt calling to check the status of this. The pharmacy still does not have anything.   Thank you,  Ayala Ghotra- Pt Rep.

## 2018-02-09 NOTE — TELEPHONE ENCOUNTER
Called and informed patient of message below.  Cadence Monterroso CMA ([facility])

## 2018-02-09 NOTE — TELEPHONE ENCOUNTER
Influenza-Like Illness (GINGER) Protocol    Parker Hunt      Age: 52 year old     YOB: 1965    Are you currently sick or have you had close contact with someone who is currently sick?   This patient is not currently sick but has had close contact with someone who was.      Evaluation for Possible Influenza Exposure  (ADULTS)    Below are conditions which place adults at increased risk for the more severe complications of influenza.    Does this patient have ANY of the following conditions?  Chronic pulmonary disease such as asthma or COPD Yes   Heart disease (CHF, CAD, anticoag due to arrhythmia) No   Liver disease (hepatitis, liver failure, cirrhosis) No   Kidney disease (renal failure, insufficiency or dialysis) No   Metabolic disorder (e.g. diabetes) No   Neuromuscular disorder (ZUHAIR JACOBO MD, myasthenia gravis) No   Compromised ability to handle respiratory secretions No   Hematologic disorder (e.g. sickle cell disease) No   HIV / AIDS No   Chemotherapy or radiation within the last 3 months No   Received an organ or a bone marrow transplant No   Taking Prednisone in excess of 20mg daily No   Is age 65 years or older No   Is pregnant, thinks she may be pregnant or is within two weeks after delivery No   Is a resident of a chronic care facility No   Is patient  or [place] native  No     Nursing Plan  Does this patient have ANY of the above conditions?    Yes.  Plan: Routed chart to provider  Patient wants Tamiflu due to having shingles and asthma.    Provided home care instructions    If further questions/concerns or if new symptoms develop, call your PCP or [facility] as soon as possible.    When to seek medical attention    Contact your health care provider right away if you experience:    A painful sore throat accompanied by fever persists for more than 48 hours    Ear pain, sinus pain, persistent vomiting and/or diarrhea    Oral temperature greater than 104  Fahrenheit  (40  Celsius)    Dehydration (e.g., mouth feeling dry, dizzy when sitting/standing, decreased urine output)    Severe or persistent vomiting; unable to keep fluids down    Improvement in flu-like symptoms (fever and cough or sore throat) but then return of fever and worse cough or sore throat    Not drinking enough fluid    Any other concerns not stated above      Additional educational resources include:    http://www.[employer]    http://www.cdc.gov/flu/  Candida Bhakta RN, BERNARDON

## 2018-02-28 ENCOUNTER — TELEPHONE (OUTPATIENT)
Dept: FAMILY MEDICINE | Facility: OTHER | Age: 53
End: 2018-02-28

## 2018-02-28 DIAGNOSIS — I10 HTN, GOAL BELOW 140/90: ICD-10-CM

## 2018-02-28 NOTE — TELEPHONE ENCOUNTER
Reason for Call:  Medication or medication refill:    Do you use a [facility]?  Name of the pharmacy and phone number for the current request:  [facility] - [phone]    Name of the medication requested: Lisinopril 10 mg    Other request: will need refill. Pharmacy states it was denied saying unknown to provider.    Can we leave a detailed message on this number? YES    Phone number patient can be reached at: Home number on file [phone] (home)    Best Time: any    Call taken on 2/28/2018 at 1:39 PM by Gisele Brown

## 2018-03-01 RX ORDER — LISINOPRIL 10 MG/1
20 TABLET ORAL DAILY
Qty: 60 TABLET | Refills: 0 | Status: SHIPPED | OUTPATIENT
Start: 2018-03-01 | End: 2018-03-08

## 2018-03-01 NOTE — TELEPHONE ENCOUNTER
lisinopril (PRINIVIL/ZESTRIL) 10 MG tablet  BP Readings from Last 3 Encounters:   02/01/18 144/90   08/08/17 130/84   03/27/17 130/84     Medication is being filled for 1 time refill only due to:  Patient needs to be seen because due for HTN follow up due to medicatton increase 02/01/2018 with BMP labs.     Please assist with scheduling.    Hermila Stahl RN, BSN

## 2018-03-01 NOTE — TELEPHONE ENCOUNTER
LM for patient to return phone call to clinic about message below.  Cadence Monterroso CMA ([facility])

## 2018-03-08 ENCOUNTER — OFFICE VISIT (OUTPATIENT)
Dept: FAMILY MEDICINE | Facility: OTHER | Age: 53
End: 2018-03-08
Payer: COMMERCIAL

## 2018-03-08 VITALS
BODY MASS INDEX: 33.11 KG/M2 | SYSTOLIC BLOOD PRESSURE: 138 MMHG | RESPIRATION RATE: 16 BRPM | HEART RATE: 63 BPM | WEIGHT: 258 LBS | TEMPERATURE: 97.4 F | DIASTOLIC BLOOD PRESSURE: 84 MMHG | OXYGEN SATURATION: 98 % | HEIGHT: 74 IN

## 2018-03-08 DIAGNOSIS — Z86.711 HISTORY OF PULMONARY EMBOLISM: ICD-10-CM

## 2018-03-08 DIAGNOSIS — R06.2 WHEEZING: ICD-10-CM

## 2018-03-08 DIAGNOSIS — J45.41 MODERATE PERSISTENT ASTHMA WITH ACUTE EXACERBATION: ICD-10-CM

## 2018-03-08 DIAGNOSIS — I10 HYPERTENSION, UNSPECIFIED TYPE: ICD-10-CM

## 2018-03-08 DIAGNOSIS — I10 HTN, GOAL BELOW 140/90: Primary | ICD-10-CM

## 2018-03-08 DIAGNOSIS — Z85.850 H/O MALIGNANT NEOPLASM OF THYROID: ICD-10-CM

## 2018-03-08 DIAGNOSIS — Z85.850 HISTORY OF THYROID CANCER: ICD-10-CM

## 2018-03-08 PROBLEM — R09.02 HYPOXIA: Status: RESOLVED | Noted: 2017-02-01 | Resolved: 2018-03-08

## 2018-03-08 PROBLEM — J01.90 ACUTE SINUSITIS: Status: RESOLVED | Noted: 2017-02-02 | Resolved: 2018-03-08

## 2018-03-08 PROBLEM — Z79.01 LONG-TERM (CURRENT) USE OF ANTICOAGULANTS: Status: RESOLVED | Noted: 2017-02-09 | Resolved: 2018-03-08

## 2018-03-08 PROCEDURE — 99213 OFFICE O/P EST LOW 20 MIN: CPT | Performed by: FAMILY MEDICINE

## 2018-03-08 RX ORDER — LISINOPRIL 40 MG/1
40 TABLET ORAL DAILY
Qty: 90 TABLET | Refills: 0 | Status: SHIPPED | OUTPATIENT
Start: 2018-03-08 | End: 2018-06-19

## 2018-03-08 RX ORDER — ALBUTEROL SULFATE 90 UG/1
AEROSOL, METERED RESPIRATORY (INHALATION)
Qty: 18 G | Refills: 3 | Status: SHIPPED | OUTPATIENT
Start: 2018-03-08 | End: 2018-03-26

## 2018-03-08 ASSESSMENT — PAIN SCALES - GENERAL: PAINLEVEL: NO PAIN (0)

## 2018-03-08 NOTE — ASSESSMENT & PLAN NOTE
Lost close to 60 pounds since last yr  BP still borderline  Increase dose of lisinopril to 40mg daily  Recheck in 3 months with a blood pressure log

## 2018-03-08 NOTE — MR AVS SNAPSHOT
After Visit Summary   3/8/2018    Parker Hunt    MRN: [id number]           Patient Information     Date Of Birth          1965        Visit Information        Provider Department      3/8/2018 5:00 PM Monica Odell MD [facility]        Today's Diagnoses     H/O malignant neoplasm of thyroid    -  1    HTN, goal below 140/90        Wheezing           Follow-ups after your visit        Follow-up notes from your care team     Return in about 3 months (around 6/8/2018).      Future tests that were ordered for you today     Open Future Orders        Priority Expected Expires Ordered    TSH with free T4 reflex Routine  3/8/2019 3/8/2018    Lipid panel reflex to direct LDL Fasting Routine  3/8/2019 3/8/2018    Comprehensive metabolic panel (BMP + Alb, Alk Phos, ALT, AST, Total. Bili, TP) Routine  3/8/2019 3/8/2018            Who to contact     If you have questions or need follow up information about today's clinic visit or your schedule please contact [facility] directly at [phone].  Normal or non-critical lab and imaging results will be communicated to you by MyChart, letter or phone within 4 business days after the clinic has received the results. If you do not hear from us within 7 days, please contact the clinic through [employer] or phone. If you have a critical or abnormal lab result, we will notify you by phone as soon as possible.  Submit refill requests through [employer] or call your pharmacy and they will forward the refill request to us. Please allow 3 business days for your refill to be completed.          Additional Information About Your Visit        MyChart Information     [employer] gives you secure access to your electronic health record. If you see a primary care provider, you can also send messages to your care team and make appointments. If you have questions, please call your primary care clinic.  If you do not have a primary care provider,  "please call [phone] and they will assist you.        Care EveryWhere ID     This is your Care EveryWhere ID. This could be used by other organizations to access your [place] medical records  [id number]        Your Vitals Were     Pulse Temperature Respirations Height Pulse Oximetry BMI (Body Mass Index)    63 97.4  F (36.3  C) (Oral) 16 6' 2.4\" (1.89 m) 98% 32.77 kg/m2       Blood Pressure from Last 3 Encounters:   03/08/18 138/84   02/01/18 144/90   08/08/17 130/84    Weight from Last 3 Encounters:   03/08/18 258 lb (117 kg)   02/01/18 258 lb 14.4 oz (117.4 kg)   08/08/17 296 lb (134.3 kg)                 Today's Medication Changes          These changes are accurate as of 3/8/18  5:32 PM.  If you have any questions, ask your nurse or doctor.               These medicines have changed or have updated prescriptions.        Dose/Directions    lisinopril 40 MG tablet   Commonly known as:  PRINIVIL/ZESTRIL   This may have changed:    - medication strength  - how much to take   Used for:  HTN, goal below 140/90   Changed by:  Monica Odell MD        Dose:  40 mg   Take 1 tablet (40 mg) by mouth daily   Quantity:  90 tablet   Refills:  0            Where to get your medicines      These medications were [place] [facility] [place] [address]  [address] [facility] [address] [facility] [address]     Phone:  [phone]     albuterol 108 (90 BASE) MCG/ACT Inhaler    lisinopril 40 MG tablet                Primary Care Provider Office Phone # Fax #    Monica Odell [phone]       [address] [facility] [address]  [facility] [address]        Equal Access to Services     OSEI DOHERTY AH: Addison Sandoval, wabrynda lukatie, qaybta kaalmajocelyn rogers, xavier krause. So [facility] [place] [phone].    ATENCIÓN: Si habla español, tiene a brantley disposición servicios gratuitos de asistencia lingüística. Yehuda al [phone].    We comply with applicable federal " civil rights laws and [place] laws. We do not discriminate on the basis of race, color, national origin, age, disability, sex, sexual orientation, or gender identity.            Thank you!     Thank you for choosing [facility]  for your care. Our goal is always to provide you with excellent care. Hearing back from our patients is one way we can continue to improve our services. Please take a few minutes to complete the written survey that you may receive in the mail after your visit with us. Thank you!             Your Updated Medication List - Protect others around you: Learn how to safely use, store and throw away your medicines at www.disposemymeds.org.          This list is accurate as of 3/8/18  5:32 PM.  Always use your most recent med list.                   Brand Name Dispense Instructions for use Diagnosis    albuterol 108 (90 BASE) MCG/ACT Inhaler    VENTOLIN HFA    18 g    USE 2 PUFFS EVERY SIX HOURS AS NEEDED FOR SHORTNESS OF BREATH AND DIFFICULTY BREATHING    Wheezing       beclomethasone 80 MCG/ACT Inhaler    QVAR    3 Inhaler    Inhale 2 puffs into the lungs 2 times daily    Severe persistent asthma without complication       lisinopril 40 MG tablet    PRINIVIL/ZESTRIL    90 tablet    Take 1 tablet (40 mg) by mouth daily    HTN, goal below 140/90       oseltamivir 75 MG capsule    TAMIFLU    10 capsule    Take 1 capsule (75 mg) by mouth daily    Exposure to the flu       valACYclovir 1000 mg tablet    VALTREX    21 tablet    Take 1 tablet (1,000 mg) by mouth 3 times daily    Herpes zoster without complication

## 2018-03-08 NOTE — NURSING NOTE
"Chief Complaint   Patient presents with     Recheck Medication     Panel Management     AAP, BMP, lipid       Initial /84 (BP Location: Right arm, Patient Position: Chair, Cuff Size: Adult Large)  Pulse 63  Temp 97.4  F (36.3  C) (Oral)  Resp 16  Ht 6' 2.4\" (1.89 m)  Wt 258 lb (117 kg)  SpO2 98%  BMI 32.77 kg/m2 Estimated body mass index is 32.77 kg/(m^2) as calculated from the following:    Height as of this encounter: 6' 2.4\" (1.89 m).    Weight as of this encounter: 258 lb (117 kg).  Medication Reconciliation: complete   Cadence Monterroso CMA ([facility])      "

## 2018-03-09 ENCOUNTER — TELEPHONE (OUTPATIENT)
Dept: FAMILY MEDICINE | Facility: OTHER | Age: 53
End: 2018-03-09

## 2018-03-09 NOTE — TELEPHONE ENCOUNTER
Prior Authorization Retail Medication Request    Medication/Dose: ventolin  ICD code (if different than what is on RX):  Previously Tried and Failed:  Rationale:    Insurance Name:   Insurance ID:       Pharmacy Information (if different than what is on RX)  Name:  Phone:

## 2018-03-15 NOTE — TELEPHONE ENCOUNTER
PA Initiation    Medication: ventolin  Insurance Company: [facility] [employer] - Phone [phone] Fax [phone]  Pharmacy Filling the Rx: [facility] [place] [address]  Filling Pharmacy Phone: [phone]  Filling Pharmacy Fax: [phone]  Start Date: 3/15/2018

## 2018-03-16 NOTE — TELEPHONE ENCOUNTER
PRIOR AUTHORIZATION DENIED    Medication: ventolin - Denied     Denial Date: 3/16/2018    Denial Rational:  Use of this drug does not meet the requirement.               Appeal Information:

## 2018-03-28 NOTE — TELEPHONE ENCOUNTER
Central Prior Authorization Team   Phone: [phone]      PA Initiation: Resubmitted with new diagnosis code.     Medication: ventolin  Insurance Company: [facility] [employer] - Phone [phone] Fax [phone]  Pharmacy Filling the Rx: [facility] [place] [address]  Filling Pharmacy Phone: [phone]  Filling Pharmacy Fax: [phone]  Start Date: 3/28/2018

## 2018-03-28 NOTE — TELEPHONE ENCOUNTER
PRIOR AUTHORIZATION DENIED    Medication: ventolin - Denied     Denial Date: 3/28/2018    Denial Rational:          Appeal Information: To appeal please send a letter of medical of necessity

## 2018-04-28 DIAGNOSIS — J45.50 SEVERE PERSISTENT ASTHMA WITHOUT COMPLICATION (H): ICD-10-CM

## 2018-04-30 NOTE — TELEPHONE ENCOUNTER
"/  Requested Prescriptions   Pending Prescriptions Disp Refills     QVAR 80 MCG/ACT Inhaler [Pharmacy Med Name: QVAR 80 MCG ORAL INHALER] 26.1 g 1     Sig: INHALE 2 PUFFS INTO THE LUNGS 2 TIMES DAILY    Inhaled Steroids Protocol Passed    4/28/2018  8:57 AM       Passed - Patient is age 12 or older       Passed - Asthma control assessment score within normal limits in last 6 months    Please review ACT score.          Passed - Recent (6 mo) or future (30 days) visit within the authorizing provider's specialty    Patient had office visit in the last 6 months or has a visit in the next 30 days with authorizing provider or within the authorizing provider's specialty.  See \"Patient Info\" tab in inbasket, or \"Choose Columns\" in Meds & Orders section of the refill encounter.            Prescription approved per [facility] Refill Protocol.  Jimbo Erazo, RN, BSN        "

## 2018-05-14 ENCOUNTER — MYC MEDICAL ADVICE (OUTPATIENT)
Dept: FAMILY MEDICINE | Facility: OTHER | Age: 53
End: 2018-05-14

## 2018-05-14 NOTE — TELEPHONE ENCOUNTER
Reviewed. Ventolin and proair are different brand names for albuterol and when we prescribe we send the generic medication .

## 2018-06-19 DIAGNOSIS — I10 HTN, GOAL BELOW 140/90: ICD-10-CM

## 2018-06-20 RX ORDER — LISINOPRIL 40 MG/1
TABLET ORAL
Qty: 30 TABLET | Refills: 0 | Status: SHIPPED | OUTPATIENT
Start: 2018-06-20 | End: 2018-07-06

## 2018-06-20 NOTE — TELEPHONE ENCOUNTER
"Requested Prescriptions   Pending Prescriptions Disp Refills     lisinopril (PRINIVIL/ZESTRIL) 40 MG tablet [Pharmacy Med Name: LISINOPRIL 40 MG TABLET] 90 tablet      Sig: Take 1 tablet (40 mg) by mouth daily    ACE Inhibitors (Including Combos) Protocol Failed    6/19/2018 10:55 AM       Failed - Normal serum creatinine on file in past 12 months    Recent Labs   Lab Test  02/05/17   0530   CR  1.02            Failed - Normal serum potassium on file in past 12 months    Recent Labs   Lab Test  02/05/17   0530   POTASSIUM  4.4            Passed - Blood pressure under 140/90 in past 12 months    BP Readings from Last 3 Encounters:   03/08/18 138/84   02/01/18 144/90   08/08/17 130/84                Passed - Recent (12 mo) or future (30 days) visit within the authorizing provider's specialty    Patient had office visit in the last 12 months or has a visit in the next 30 days with authorizing provider or within the authorizing provider's specialty.  See \"Patient Info\" tab in inbasket, or \"Choose Columns\" in Meds & Orders section of the refill encounter.           Passed - Patient is age 18 or older        Medication is being filled for 1 time refill only due to:  Labs not current:  Creatinine, postassium  Patient needs to be seen because:  Per 3/8/18 OV plan, return in 3 months.    Please help pt schedule an appt.    Noni Hoang RN          "

## 2018-06-25 ENCOUNTER — TELEPHONE (OUTPATIENT)
Dept: FAMILY MEDICINE | Facility: OTHER | Age: 53
End: 2018-06-25

## 2018-06-25 NOTE — TELEPHONE ENCOUNTER
Prior Authorization Retail Medication Request    Medication/Dose: QVAR 80 MCG/ACT Inhaler  ICD code (if different than what is on RX):     Previously Tried and Failed:     Rationale:       Insurance Name:     Insurance ID:         Pharmacy Information (if different than what is on RX)  Name:     Phone:

## 2018-06-25 NOTE — TELEPHONE ENCOUNTER
Prior Authorization Approval    Authorization Effective Date: 6/25/2018  Authorization Expiration Date: 6/25/2019  Medication: QVAR 80 MCG/ACT Inhaler - Approved  Approved Dose/Quantity:    Reference #:     Insurance Company: [facility] [employer] - Phone [phone] Fax [phone]  Expected CoPay:       CoPay Card Available:      Foundation Assistance Needed:    Which Pharmacy is filling the prescription (Not needed for infusion/clinic administered): [id number] [place] [place] [facility] [address] [address] [place] [address]  Pharmacy Notified: Yes  Patient Notified: Yes

## 2018-06-25 NOTE — TELEPHONE ENCOUNTER
Central Prior Authorization Team   Phone: [phone]      PA Initiation    Medication: QVAR 80 MCG/ACT Inhaler  Insurance Company: [employer] [employer] - Phone [phone] Fax [phone]  [id number] [place] [facility] [place], [place] - [address] [address] [place] [address]  Filling Pharmacy Phone: [phone]  Filling Pharmacy Fax:    Start Date: 6/25/2018

## 2018-06-26 ENCOUNTER — MYC MEDICAL ADVICE (OUTPATIENT)
Dept: FAMILY MEDICINE | Facility: OTHER | Age: 53
End: 2018-06-26

## 2018-07-02 NOTE — PROGRESS NOTES
SUBJECTIVE:   CC: Parker Hunt is an 52 year old male who presents for preventative health visit.     Physical   Annual:     Getting at least 3 servings of Calcium per day:  Yes    Bi-annual eye exam:  Yes    Dental care twice a year:  NO    Sleep apnea or symptoms of sleep apnea:  None    Diet:  Regular (no restrictions)    Frequency of exercise:  6-7 days/week    Duration of exercise:  Greater than 60 minutes    Taking medications regularly:  Yes    Medication side effects:  None, No muscle aches and No significant flushing    Additional concerns today:  No    Answers for HPI/ROS submitted by the patient on 7/6/2018   PHQ-2 Score: 0      Today's PHQ-2 Score:   PHQ-2 ( 1999 Pfizer) 2/1/2018   Q1: Little interest or pleasure in doing things 0   Q2: Feeling down, depressed or hopeless 0   PHQ-2 Score 0       Abuse: Current or Past(Physical, Sexual or Emotional)- No  Do you feel safe in your environment - Yes    Social History   Substance Use Topics     Smoking status: Never Smoker     Smokeless tobacco: Never Used     Alcohol use Yes      Comment: 2-3 beers/month     Last PSA: No results found for: PSA    Reviewed orders with patient. 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     Atypical chest pain     Thyroid nodules     Hyperlipidemia LDL goal <160     Hypertension     History of thyroid cancer     History of pulmonary embolism     Moderate persistent asthma with acute exacerbation     Obesity     Herpes zoster without complication     Past Surgical History:   Procedure  Laterality Date     COLONOSCOPY  3/21/2014    Procedure: COLONOSCOPY;  colonoscopy;  Surgeon: Sarbjit Sy MD;  Location:  GI     NO HISTORY OF SURGERY       THYROIDECTOMY  4/29/2011    Procedure:THYROIDECTOMY; Jordan Thyroidectomy, Possible Total Thyroidectomy; Surgeon:JOSHUA CHAPPELL; Location: OR       Social History   Substance Use Topics     Smoking status: Never Smoker     Smokeless tobacco: Never Used     Alcohol use Yes      Comment: 2-3 beers/month     Family History   Problem Relation Age of Onset     Respiratory Brother      asthma     C.A.D. Maternal Grandfather      in 60s had heart issues         Current Outpatient Prescriptions   Medication Sig Dispense Refill     albuterol (VENTOLIN HFA) 108 (90 BASE) MCG/ACT Inhaler USE 2 PUFFS EVERY SIX HOURS AS NEEDED FOR SHORTNESS OF BREATH AND DIFFICULTY BREATHING 18 g 3     aspirin 81 MG tablet Take 1 tablet (81 mg) by mouth daily 90 tablet 3     lisinopril (PRINIVIL/ZESTRIL) 40 MG tablet Take 1 tablet (40 mg) by mouth daily 90 tablet 1     QVAR 80 MCG/ACT Inhaler INHALE 2 PUFFS INTO THE LUNGS 2 TIMES DAILY 26.1 g 1     valACYclovir (VALTREX) 1000 mg tablet Take 1 tablet (1,000 mg) by mouth 3 times daily 21 tablet 0     [DISCONTINUED] lisinopril (PRINIVIL/ZESTRIL) 40 MG tablet Take 1 tablet (40 mg) by mouth daily 30 tablet 0     Allergies   Allergen Reactions     No Known Drug Allergies      Recent Labs   Lab Test  03/31/17   0755  02/05/17   0530  02/01/17   0930  05/21/15   1739  07/31/12   1141   03/07/11   0844   LDL   --    --    --    --   124   --    --    HDL   --    --    --    --   38*   --    --    TRIG   --    --    --    --   126   --    --    ALT   --    --    --   79*   --    --    --    CR   --   1.02  1.06  1.04   --    --    --    GFRESTIMATED   --   76  73  75   --    --    --    GFRESTBLACK   --   >90   GFR Calc    88  >90   GFR Calc     --    --    --    POTASSIUM   --   4.4  4.0  4.9   --    < >    --    TSH  2.71   --    --    --    --    --   1.54    < > = values in this interval not displayed.        Reviewed and updated as needed this visit by clinical staff         Reviewed and updated as needed this visit by Provider          Past Medical History:   Diagnosis Date     Asthma     as a child      NO ACTIVE PROBLEMS       Past Surgical History:   Procedure Laterality Date     COLONOSCOPY  3/21/2014    Procedure: COLONOSCOPY;  colonoscopy;  Surgeon: Sarbjit Sy MD;  Location: [facility]     NO HISTORY OF SURGERY       THYROIDECTOMY  4/29/2011    Procedure:THYROIDECTOMY; Jordan Thyroidectomy, Possible Total Thyroidectomy; Surgeon:JOSHUA CHAPPELL; Location:[facility]       Review of Systems   Constitutional: Negative for chills and fever.   HENT: Negative for congestion, ear pain, hearing loss and sore throat.    Eyes: Negative for pain and visual disturbance.   Respiratory: Negative for cough and shortness of breath.    Cardiovascular: Negative for chest pain, palpitations and peripheral edema.   Gastrointestinal: Negative for abdominal pain, constipation, diarrhea, heartburn, hematochezia and nausea.   Endocrine: Negative.    Genitourinary: Negative for discharge, dysuria, frequency, genital sores, hematuria, impotence and urgency.   Musculoskeletal: Positive for arthralgias. Negative for joint swelling and myalgias.   Skin: Negative for rash.   Allergic/Immunologic: Negative.    Neurological: Negative for dizziness, weakness, headaches and paresthesias.   Hematological: Negative.    Psychiatric/Behavioral: Negative for mood changes. The patient is not nervous/anxious.          OBJECTIVE:   There were no vitals taken for this visit.    Physical Exam   Constitutional: He is oriented to person, place, and time. He appears well-developed and well-nourished. No distress.   HENT:   Right Ear: Tympanic membrane and external ear normal.   Left Ear: Tympanic membrane and external ear normal.   Nose: Nose normal.    Mouth/Throat: Oropharynx is clear and moist. No oral lesions. No oropharyngeal exudate.   Eyes: Conjunctivae are normal. Pupils are equal, round, and reactive to light. Right eye exhibits no discharge. Left eye exhibits no discharge.   Neck: Neck supple. No tracheal deviation present. No thyromegaly present.   Cardiovascular: Normal rate, regular rhythm, S1 normal, S2 normal, normal heart sounds and normal pulses.  Exam reveals no S3 and no S4.    No murmur heard.  Pulmonary/Chest: Effort normal and breath sounds normal. No respiratory distress. He has no wheezes. He has no rales.   Abdominal: Soft. Bowel sounds are normal. He exhibits no mass. There is no hepatosplenomegaly. There is no tenderness.   Musculoskeletal: Normal range of motion. He exhibits no edema or deformity.   Lymphadenopathy:     He has no cervical adenopathy.   Neurological: He is alert and oriented to person, place, and time. He has normal strength and normal reflexes. He exhibits normal muscle tone.   Skin: Skin is warm and dry. No lesion and no rash noted.   Psychiatric: He has a normal mood and affect. His speech is normal. Judgment and thought content normal. Cognition and memory are normal.         Diagnostic Test Results:  none     ASSESSMENT/PLAN:     Problem List Items Addressed This Visit     Thyroid nodules     S/p Jordan thyroidectomy. Recheck TSH annually         Hypertension     Well-controlled on the current medications.  6 months of prescription sent.  Can refill for 6 months before next physical.  Update chemistries and thyroid tests today         Relevant Medications    lisinopril (PRINIVIL/ZESTRIL) 40 MG tablet    aspirin 81 MG tablet    History of pulmonary embolism     Stable.  no new symptoms. off anticoagulants.           Other Visit Diagnoses     HTN, goal below 140/90    -  Primary    Relevant Medications    lisinopril (PRINIVIL/ZESTRIL) 40 MG tablet    aspirin 81 MG tablet    Other Relevant Orders    Lipid panel reflex  "to direct LDL Fasting (Completed)    TSH WITH FREE T4 REFLEX (Completed)    Comprehensive metabolic panel (BMP + Alb, Alk Phos, ALT, AST, Total. Bili, TP) (Completed)    Screening for HIV (human immunodeficiency virus)        Severe persistent asthma without complication        Encounter for routine adult health examination without abnormal findings                COUNSELING:   Reviewed preventive health counseling, as reflected in patient instructions       Regular exercise       Healthy diet/nutrition       Vision screening       Hearing screening    BP Readings from Last 1 Encounters:   03/08/18 138/84     Estimated body mass index is 32.77 kg/(m^2) as calculated from the following:    Height as of 3/8/18: 6' 2.4\" (1.89 m).    Weight as of 3/8/18: 258 lb (117 kg).           reports that he has never smoked. He has never used smokeless tobacco.      Counseling Resources:  ATP IV Guidelines  Pooled Cohorts Equation Calculator  FRAX Risk Assessment  ICSI Preventive Guidelines  Dietary Guidelines for Americans, 2010  USDA's MyPlate  ASA Prophylaxis  Lung CA Screening    Monica Odell MD  [facility]  "

## 2018-07-06 ENCOUNTER — OFFICE VISIT (OUTPATIENT)
Dept: FAMILY MEDICINE | Facility: OTHER | Age: 53
End: 2018-07-06
Payer: COMMERCIAL

## 2018-07-06 VITALS
WEIGHT: 259 LBS | TEMPERATURE: 97.8 F | SYSTOLIC BLOOD PRESSURE: 134 MMHG | HEART RATE: 67 BPM | HEIGHT: 74 IN | BODY MASS INDEX: 33.24 KG/M2 | OXYGEN SATURATION: 98 % | RESPIRATION RATE: 16 BRPM | DIASTOLIC BLOOD PRESSURE: 88 MMHG

## 2018-07-06 DIAGNOSIS — Z00.00 ENCOUNTER FOR ROUTINE ADULT HEALTH EXAMINATION WITHOUT ABNORMAL FINDINGS: ICD-10-CM

## 2018-07-06 DIAGNOSIS — I10 HYPERTENSION, UNSPECIFIED TYPE: ICD-10-CM

## 2018-07-06 DIAGNOSIS — J45.50 SEVERE PERSISTENT ASTHMA WITHOUT COMPLICATION (H): ICD-10-CM

## 2018-07-06 DIAGNOSIS — E04.1 THYROID NODULE: ICD-10-CM

## 2018-07-06 DIAGNOSIS — Z11.4 SCREENING FOR HIV (HUMAN IMMUNODEFICIENCY VIRUS): ICD-10-CM

## 2018-07-06 DIAGNOSIS — J45.41 MODERATE PERSISTENT ASTHMA WITH ACUTE EXACERBATION: ICD-10-CM

## 2018-07-06 DIAGNOSIS — Z86.711 HISTORY OF PULMONARY EMBOLISM: ICD-10-CM

## 2018-07-06 DIAGNOSIS — I10 HTN, GOAL BELOW 140/90: Primary | ICD-10-CM

## 2018-07-06 LAB
ALBUMIN SERPL-MCNC: 3.9 G/DL (ref 3.4–5)
ALP SERPL-CCNC: 42 U/L (ref 40–150)
ALT SERPL W P-5'-P-CCNC: 28 U/L (ref 0–70)
ANION GAP SERPL CALCULATED.3IONS-SCNC: 9 MMOL/L (ref 3–14)
AST SERPL W P-5'-P-CCNC: 16 U/L (ref 0–45)
BILIRUB SERPL-MCNC: 1.3 MG/DL (ref 0.2–1.3)
BUN SERPL-MCNC: 11 MG/DL (ref 7–30)
CALCIUM SERPL-MCNC: 9 MG/DL (ref 8.5–10.1)
CHLORIDE SERPL-SCNC: 106 MMOL/L (ref 94–109)
CHOLEST SERPL-MCNC: 174 MG/DL
CO2 SERPL-SCNC: 27 MMOL/L (ref 20–32)
CREAT SERPL-MCNC: 0.98 MG/DL (ref 0.66–1.25)
GFR SERPL CREATININE-BSD FRML MDRD: 79 ML/MIN/1.7M2
GLUCOSE SERPL-MCNC: 84 MG/DL (ref 70–99)
HDLC SERPL-MCNC: 50 MG/DL
LDLC SERPL CALC-MCNC: 107 MG/DL
NONHDLC SERPL-MCNC: 124 MG/DL
POTASSIUM SERPL-SCNC: 4.4 MMOL/L (ref 3.4–5.3)
PROT SERPL-MCNC: 6.7 G/DL (ref 6.8–8.8)
SODIUM SERPL-SCNC: 142 MMOL/L (ref 133–144)
TRIGL SERPL-MCNC: 86 MG/DL
TSH SERPL DL<=0.005 MIU/L-ACNC: 2.44 MU/L (ref 0.4–4)

## 2018-07-06 PROCEDURE — 84443 ASSAY THYROID STIM HORMONE: CPT | Performed by: FAMILY MEDICINE

## 2018-07-06 PROCEDURE — 80053 COMPREHEN METABOLIC PANEL: CPT | Performed by: FAMILY MEDICINE

## 2018-07-06 PROCEDURE — 99396 PREV VISIT EST AGE 40-64: CPT | Performed by: FAMILY MEDICINE

## 2018-07-06 PROCEDURE — 36415 COLL VENOUS BLD VENIPUNCTURE: CPT | Performed by: FAMILY MEDICINE

## 2018-07-06 PROCEDURE — 80061 LIPID PANEL: CPT | Performed by: FAMILY MEDICINE

## 2018-07-06 RX ORDER — LISINOPRIL 40 MG/1
40 TABLET ORAL DAILY
Qty: 90 TABLET | Refills: 1 | Status: SHIPPED | OUTPATIENT
Start: 2018-07-06 | End: 2019-01-11

## 2018-07-06 ASSESSMENT — ENCOUNTER SYMPTOMS
ENDOCRINE NEGATIVE: 1
SHORTNESS OF BREATH: 0
HEMATOCHEZIA: 0
FREQUENCY: 0
CONSTIPATION: 0
HEADACHES: 0
ARTHRALGIAS: 1
WEAKNESS: 0
EYE PAIN: 0
DIARRHEA: 0
JOINT SWELLING: 0
HEARTBURN: 0
ABDOMINAL PAIN: 0
COUGH: 0
SORE THROAT: 0
DIZZINESS: 0
MYALGIAS: 0
NAUSEA: 0
HEMATURIA: 0
DYSURIA: 0
FEVER: 0
NERVOUS/ANXIOUS: 0
PALPITATIONS: 0
CHILLS: 0
HEMATOLOGIC/LYMPHATIC NEGATIVE: 1
ALLERGIC/IMMUNOLOGIC NEGATIVE: 1
PARESTHESIAS: 0

## 2018-07-06 ASSESSMENT — PAIN SCALES - GENERAL: PAINLEVEL: NO PAIN (0)

## 2018-07-06 NOTE — ASSESSMENT & PLAN NOTE
Well-controlled on the current medications.  6 months of prescription sent.  Can refill for 6 months before next physical.  Update chemistries and thyroid tests today

## 2018-07-06 NOTE — MR AVS SNAPSHOT
After Visit Summary   7/6/2018    Parker Hunt    MRN: [id number]           Patient Information     Date Of Birth          1965        Visit Information        Provider Department      7/6/2018 4:20 PM Monica Odell MD [facility]        Today's Diagnoses     Screening for HIV (human immunodeficiency virus)    -  1    HTN, goal below 140/90        Severe persistent asthma without complication        Thyroid nodules          Care Instructions      Preventive Health Recommendations  Male Ages 50 - 64    Yearly exam:             See your health care provider every year in order to  o   Review health changes.   o   Discuss preventive care.    o   Review your medicines if your doctor has prescribed any.     Have a cholesterol test every 5 years, or more frequently if you are at risk for high cholesterol/heart disease.     Have a diabetes test (fasting glucose) every three years. If you are at risk for diabetes, you should have this test more often.     Have a colonoscopy at age 50, or have a yearly FIT test (stool test). These exams will check for colon cancer.      Talk with your health care provider about whether or not a prostate cancer screening test (PSA) is right for you.    You should be tested each year for STDs (sexually transmitted diseases), if you re at risk.     Shots: Get a flu shot each year. Get a tetanus shot every 10 years.     Nutrition:    Eat at least 5 servings of fruits and vegetables daily.     Eat whole-grain bread, whole-wheat pasta and brown rice instead of white grains and rice.     Get adequate Calcium and Vitamin D.     Lifestyle    Exercise for at least 150 minutes a week (30 minutes a day, 5 days a week). This will help you control your weight and prevent disease.     Limit alcohol to one drink per day.     No smoking.     Wear sunscreen to prevent skin cancer.     See your dentist every six months for an exam and cleaning.     See your eye doctor  "every 1 to 2 years.            Follow-ups after your visit        Follow-up notes from your care team     Return in about 6 months (around 1/6/2019).      Who to contact     If you have questions or need follow up information about today's clinic visit or your schedule please contact [facility] [place] directly at [phone].  Normal or non-critical lab and imaging results will be communicated to you by MyChart, letter or phone within 4 business days after the clinic has received the results. If you do not hear from us within 7 days, please contact the clinic through LS9hart or phone. If you have a critical or abnormal lab result, we will notify you by phone as soon as possible.  Submit refill requests through Freebase or call your pharmacy and they will forward the refill request to us. Please allow 3 business days for your refill to be completed.          Additional Information About Your Visit        LS9hart Information     Freebase gives you secure access to your electronic health record. If you see a primary care provider, you can also send messages to your care team and make appointments. If you have questions, please call your primary care clinic.  If you do not have a primary care provider, please call [phone] and they will assist you.        Care EveryWhere ID     This is your Care EveryWhere ID. This could be used by other organizations to access your [place] medical records  [id number]        Your Vitals Were     Pulse Temperature Respirations Height Pulse Oximetry BMI (Body Mass Index)    67 97.8  F (36.6  C) (Temporal) 16 6' 2.4\" (1.89 m) 98% 32.9 kg/m2       Blood Pressure from Last 3 Encounters:   07/06/18 134/88   03/08/18 138/84   02/01/18 144/90    Weight from Last 3 Encounters:   07/06/18 259 lb (117.5 kg)   03/08/18 258 lb (117 kg)   02/01/18 258 lb 14.4 oz (117.4 kg)              We Performed the Following     Comprehensive metabolic panel (BMP + Alb, Alk Phos, ALT, AST, Total. " AMADOR Garcia)     Lipid panel reflex to direct LDL Fasting     TSH WITH FREE T4 REFLEX          Today's Medication Changes          These changes are accurate as of 7/6/18  4:33 PM.  If you have any questions, ask your nurse or doctor.               Start taking these medicines.        Dose/Directions    aspirin 81 MG tablet   Used for:  HTN, goal below 140/90   Started by:  Monica Odell MD        Dose:  81 mg   Take 1 tablet (81 mg) by mouth daily   Quantity:  90 tablet   Refills:  3         Stop taking these medicines if you haven't already. Please contact your care team if you have questions.     oseltamivir 75 MG capsule   Commonly known as:  TAMIFLU   Stopped by:  Monica Odell MD                Where to get your medicines      These medications were sent to [facility] [place], [address]  [address] [facility] [address]     Phone:  [phone]     aspirin 81 MG tablet    lisinopril 40 MG tablet                Primary Care Provider Office Phone # Fax #    Monica Odell [phone]       [address] [facility] [address]  [place] [place] [facility] [address]        Equal Access to Services     [facility]: Hadii aad ku hadasho Soomaali, waaxda luqadaha, qaybta kaalmada adeegyada, xavier barreto hayej navas . So [facility] [place] [phone].    ATENCIÓN: Si habla español, tiene a brantley disposición servicios gratuitos de asistencia lingüística. [facility] [phone].    We comply with applicable federal civil rights laws and [place] laws. We do not discriminate on the basis of race, color, national origin, age, disability, sex, sexual orientation, or gender identity.            Thank you!     Thank you for choosing [facility]  for your care. Our goal is always to provide you with excellent care. Hearing back from our patients is one way we can continue to improve our services. Please take a few minutes to complete the written survey that you may receive in  the mail after your visit with us. Thank you!             Your Updated Medication List - Protect others around you: Learn how to safely use, store and throw away your medicines at www.disposemymeds.org.          This list is accurate as of 7/6/18  4:33 PM.  Always use your most recent med list.                   Brand Name Dispense Instructions for use Diagnosis    albuterol 108 (90 Base) MCG/ACT Inhaler    VENTOLIN HFA    18 g    USE 2 PUFFS EVERY SIX HOURS AS NEEDED FOR SHORTNESS OF BREATH AND DIFFICULTY BREATHING    Asthma in adult, moderate persistent, with acute exacerbation       aspirin 81 MG tablet     90 tablet    Take 1 tablet (81 mg) by mouth daily    HTN, goal below 140/90       lisinopril 40 MG tablet    PRINIVIL/ZESTRIL    90 tablet    Take 1 tablet (40 mg) by mouth daily    HTN, goal below 140/90       QVAR 80 MCG/ACT Inhaler   Generic drug:  beclomethasone     26.1 g    INHALE 2 PUFFS INTO THE LUNGS 2 TIMES DAILY    Severe persistent asthma without complication       valACYclovir 1000 mg tablet    VALTREX    21 tablet    Take 1 tablet (1,000 mg) by mouth 3 times daily    Herpes zoster without complication

## 2018-07-06 NOTE — LETTER
My Asthma Action Plan  Name: Parker Hunt   YOB: 1965  Date: 7/6/2018   My doctor: Monica Odell MD   My clinic: [facility] [place]        My Control Medicine: Beclomethasone (QVar) -  80 mcg .  My Rescue Medicine: Albuterol (Proair/Ventolin/Proventil) inhaler .   My Asthma Severity: mild persistent  Avoid your asthma triggers: .               GREEN ZONE   Good Control    I feel good    No cough or wheeze    Can work, sleep and play without asthma symptoms       Take your asthma control medicine every day.     1. If exercise triggers your asthma, take your rescue medication    15 minutes before exercise or sports, and    During exercise if you have asthma symptoms  2. Spacer to use with inhaler: If you have a spacer, make sure to use it with your inhaler             YELLOW ZONE Getting Worse  I have ANY of these:    I do not feel good    Cough or wheeze    Chest feels tight    Wake up at night   1. Keep taking your Green Zone medications  2. Start taking your rescue medicine:    every 20 minutes for up to 1 hour. Then every 4 hours for 24-48 hours.  3. If you stay in the Yellow Zone for more than 12-24 hours, contact your doctor.  4. If you do not return to the Green Zone in 12-24 hours or you get worse, start taking your oral steroid medicine if prescribed by your provider.           RED ZONE Medical Alert - Get Help  I have ANY of these:    I feel awful    Medicine is not helping    Breathing getting harder    Trouble walking or talking    Nose opens wide to breathe       1. Take your rescue medicine NOW  2. If your provider has prescribed an oral steroid medicine, start taking it NOW  3. Call your doctor NOW  4. If you are still in the Red Zone after 20 minutes and you have not reached your doctor:    Take your rescue medicine again and    Call 911 or go to the emergency room right away    See your regular doctor within 2 weeks of an Emergency Room or Urgent Care visit for follow-up  treatment.          Annual Reminders:  Meet with Asthma Educator,  Flu Shot in the Fall, consider Pneumonia Vaccination for patients with asthma (aged 19 and older).    Pharmacy:    [facility] [place] [address] [facility] [place] [facility] [place], [place] - [address]  [facility] [place] [address]  [facility] - [place], [place] - [address]                      Asthma Triggers  How To Control Things That Make Your Asthma Worse    Triggers are things that make your asthma worse.  Look at the list below to help you find your triggers and what you can do about them.  You can help prevent asthma flare-ups by staying away from your triggers.      Trigger                                                          What you can do   Cigarette Smoke  Tobacco smoke can make asthma worse. Do not allow smoking in your home, car or around you.  Be sure no one smokes at a child s day care or school.  If you smoke, ask your health care provider for ways to help you quit.  Ask family members to quit too.  Ask your health care provider for a referral to Quit Plan to help you quit smoking, or call 7-707-627-PLAN.     Colds, Flu, Bronchitis  These are common triggers of asthma. Wash your hands often.  Don t touch your eyes, nose or mouth.  Get a flu shot every year.     Dust Mites  These are tiny bugs that live in cloth or carpet. They are too small to see. Wash sheets and blankets in hot water every week.   Encase pillows and mattress in dust mite proof covers.  Avoid having carpet if you can. If you have carpet, vacuum weekly.   Use a dust mask and HEPA vacuum.   Pollen and Outdoor Mold  Some people are allergic to trees, grass, or weed pollen, or molds. Try to keep your windows closed.  Limit time out doors when pollen count is high.   Ask you health care provider about taking medicine during allergy season.     Animal Dander  Some people are  allergic to skin flakes, urine or saliva from pets with fur or feathers. Keep pets with fur or feathers out of your home.    If you can t keep the pet outdoors, then keep the pet out of your bedroom.  Keep the bedroom door closed.  Keep pets off cloth furniture and away from stuffed toys.     Mice, Rats, and Cockroaches  Some people are allergic to the waste from these pests.   Cover food and garbage.  Clean up spills and food crumbs.  Store grease in the refrigerator.   Keep food out of the bedroom.   Indoor Mold  This can be a trigger if your home has high moisture. Fix leaking faucets, pipes, or other sources of water.   Clean moldy surfaces.  Dehumidify basement if it is damp and smelly.   Smoke, Strong Odors, and Sprays  These can reduce air quality. Stay away from strong odors and sprays, such as perfume, powder, hair spray, paints, smoke incense, paint, cleaning products, candles and new carpet.   Exercise or Sports  Some people with asthma have this trigger. Be active!  Ask your doctor about taking medicine before sports or exercise to prevent symptoms.    Warm up for 5-10 minutes before and after sports or exercise.     Other Triggers of Asthma  Cold air:  Cover your nose and mouth with a scarf.  Sometimes laughing or crying can be a trigger.  Some medicines and food can trigger asthma.

## 2018-07-10 ASSESSMENT — ASTHMA QUESTIONNAIRES: ACT_TOTALSCORE: 24

## 2018-10-16 ENCOUNTER — MYC MEDICAL ADVICE (OUTPATIENT)
Dept: FAMILY MEDICINE | Facility: OTHER | Age: 53
End: 2018-10-16

## 2018-10-17 NOTE — TELEPHONE ENCOUNTER
Last physical was 07/06/2018. Forms printed and filled out to the best of my ability. Placed in RK bin for review/signature, if appropriate.    Message sent to patient informing him RK out until tomorrow.  Nikki Thomas, CMA

## 2018-10-18 NOTE — TELEPHONE ENCOUNTER
Spoke to patient and informed him that I can fax the forms back if he'd like. And he said yes... Forms faxed to [employer]

## 2018-10-22 ENCOUNTER — MYC MEDICAL ADVICE (OUTPATIENT)
Dept: FAMILY MEDICINE | Facility: OTHER | Age: 53
End: 2018-10-22

## 2018-10-23 NOTE — TELEPHONE ENCOUNTER
Will route to RK to review/advise on if patients PE condition was considered temporary   Sara Covarrubias CMA

## 2018-11-26 ENCOUNTER — MYC MEDICAL ADVICE (OUTPATIENT)
Dept: FAMILY MEDICINE | Facility: OTHER | Age: 53
End: 2018-11-26

## 2018-11-27 NOTE — TELEPHONE ENCOUNTER
There was no cause identified on the labs we performed on him  and hence I will not be able to give them a  cause.

## 2019-01-11 DIAGNOSIS — I10 HTN, GOAL BELOW 140/90: ICD-10-CM

## 2019-01-11 RX ORDER — LISINOPRIL 40 MG/1
TABLET ORAL
Qty: 90 TABLET | Refills: 1 | Status: SHIPPED | OUTPATIENT
Start: 2019-01-11 | End: 2019-07-17

## 2019-01-11 NOTE — TELEPHONE ENCOUNTER
"Requested Prescriptions   Pending Prescriptions Disp Refills     lisinopril (PRINIVIL/ZESTRIL) 40 MG tablet [Pharmacy Med Name: LISINOPRIL 40 MG TABLET] 90 tablet 1     Sig: TAKE ONE TABLET BY MOUTH once daily    ACE Inhibitors (Including Combos) Protocol Passed - 1/11/2019  8:00 AM       Passed - Blood pressure under 140/90 in past 12 months    BP Readings from Last 3 Encounters:   07/06/18 134/88   03/08/18 138/84   02/01/18 144/90          Passed - Recent (12 mo) or future (30 days) visit within the authorizing provider's specialty    Patient had office visit in the last 12 months or has a visit in the next 30 days with authorizing provider or within the authorizing provider's specialty.  See \"Patient Info\" tab in inbasket, or \"Choose Columns\" in Meds & Orders section of the refill encounter.         Passed - Medication is active on med list       Passed - Patient is age 18 or older       Passed - Normal serum creatinine on file in past 12 months    Recent Labs   Lab Test 07/06/18  1634   CR 0.98          Passed - Normal serum potassium on file in past 12 months    Recent Labs   Lab Test 07/06/18  1634   POTASSIUM 4.4           lisinopril (PRINIVIL/ZESTRIL) 40 MG tablet  Prescription approved per [facility] Refill Protocol.    Hermila Stahl, RN, BSN     "

## 2019-03-09 DIAGNOSIS — J45.50 SEVERE PERSISTENT ASTHMA WITHOUT COMPLICATION (H): ICD-10-CM

## 2019-03-11 RX ORDER — BECLOMETHASONE DIPROPIONATE HFA 80 UG/1
AEROSOL, METERED RESPIRATORY (INHALATION)
Qty: 31.8 G | Refills: 1 | Status: SHIPPED | OUTPATIENT
Start: 2019-03-11 | End: 2019-08-08

## 2019-03-11 NOTE — TELEPHONE ENCOUNTER
QVAR    ACT Total Scores 7/9/2018   ACT TOTAL SCORE -   ASTHMA ER VISITS -   ASTHMA HOSPITALIZATIONS -   ACT TOTAL SCORE (Goal Greater than or Equal to 20) 24   In the past 12 months, how many times did you visit the emergency room for your asthma without being admitted to the hospital? 0   In the past 12 months, how many times were you hospitalized overnight because of your asthma? 0     Routing refill request to provider for review/approval because:  Labs not current:  ACT  LOV 7/6/2018      Lana Rai RN, BSN

## 2019-03-12 NOTE — TELEPHONE ENCOUNTER
Reason for call:  Other   Patient called regarding (reason for call): returning call  Additional comments: Patient stated that his message said he needed to have the paper in front of him. He is open to having this mailed to him. Otherwise, he is open to doing this over the phone as well. Address and phone number have been verified.    Phone number to reach patient:  Cell number on file:    Telephone Information:   Mobile [phone]       Best Time:  any    Can we leave a detailed message on this number?  YES     Charmaine ADAME  Central Scheduler

## 2019-03-12 NOTE — TELEPHONE ENCOUNTER
ACT mailed to patient- postponed for 1 week.  Will contact patient then.  Cadence Monterroso CMA ([facility])

## 2019-03-14 NOTE — TELEPHONE ENCOUNTER
Spoke to patient and informed him that inhaler was filled with additional refill- He is wondering because he got score of 23 . Does he still need to be seen?

## 2019-07-17 DIAGNOSIS — I10 HTN, GOAL BELOW 140/90: ICD-10-CM

## 2019-07-17 RX ORDER — LISINOPRIL 40 MG/1
TABLET ORAL
Qty: 30 TABLET | Refills: 0 | Status: SHIPPED | OUTPATIENT
Start: 2019-07-17 | End: 2019-08-08

## 2019-07-17 NOTE — TELEPHONE ENCOUNTER
Pending Prescriptions:                       Disp   Refills    lisinopril (PRINIVIL/ZESTRIL) 40 MG table*90 tab*1            Sig: TAKE ONE TABLET BY MOUTH once daily    Medication is being filled for 1 time refill only due to:  Patient needs labs creatinine, potassium. Patient needs to be seen because BP check and annual f/u OV.    Malena Lao, RN, BSN

## 2019-08-05 NOTE — PROGRESS NOTES
SUBJECTIVE:   CC: Parker Hunt is an 53 year old male who presents for preventative health visit.     Healthy Habits:     Getting at least 3 servings of Calcium per day:  Yes    Bi-annual eye exam:  NO    Dental care twice a year:  NO    Sleep apnea or symptoms of sleep apnea:  None    Diet:  Regular (no restrictions)    Frequency of exercise:  6-7 days/week    Duration of exercise:  Greater than 60 minutes    Taking medications regularly:  Yes    Barriers to taking medications:  Not applicable    Medication side effects:  None    PHQ-2 Total Score: 0    Additional concerns today:  No        Today's PHQ-2 Score:   PHQ-2 ( 1999 [employer]) 8/6/2019   Q1: Little interest or pleasure in doing things 0   Q2: Feeling down, depressed or hopeless 0   PHQ-2 Score 0   Q1: Little interest or pleasure in doing things Not at all   Q2: Feeling down, depressed or hopeless Not at all   PHQ-2 Score 0       Abuse: Current or Past(Physical, Sexual or Emotional)- No  Do you feel safe in your environment? Yes    Social History     Tobacco Use     Smoking status: Never Smoker     Smokeless tobacco: Never Used   Substance Use Topics     Alcohol use: Yes     Comment: 2-3 beers/month     If you drink alcohol do you typically have >3 drinks per day or >7 drinks per week? No    Last PSA: No results found for: PSA    Reviewed orders with patient. Reviewed health maintenance and updated orders accordingly - Yes  Lab work is in process    Reviewed and updated as needed this visit by clinical staff  Tobacco  Allergies  Meds  Problems  Med Hx  Surg Hx  Fam Hx  Soc Hx          Reviewed and updated as needed this visit by Provider  Problems          Past Medical History:   Diagnosis Date     Asthma     as a child      NO ACTIVE PROBLEMS       Past Surgical History:   Procedure Laterality Date     COLONOSCOPY  3/21/2014    Procedure: COLONOSCOPY;  colonoscopy;  Surgeon: Sarbjit Sy MD;  Location: [facility]     NO HISTORY OF SURGERY        "THYROIDECTOMY  4/29/2011    Procedure:THYROIDECTOMY; Jordan Thyroidectomy, Possible Total Thyroidectomy; Surgeon:JOSHUA CHAPPELL; Location:[facility]       Review of Systems   Constitutional: Negative for chills and fever.   HENT: Negative for congestion, ear pain, hearing loss and sore throat.    Eyes: Negative for pain and visual disturbance.   Respiratory: Negative for cough and shortness of breath.    Cardiovascular: Negative for chest pain, palpitations and peripheral edema.   Gastrointestinal: Negative for abdominal pain, constipation, diarrhea, heartburn, hematochezia and nausea.   Genitourinary: Negative for discharge, dysuria, frequency, genital sores, hematuria, impotence and urgency.   Musculoskeletal: Negative for arthralgias, joint swelling and myalgias.   Skin: Negative for rash.   Neurological: Negative for dizziness, weakness, headaches and paresthesias.   Psychiatric/Behavioral: Negative for mood changes. The patient is not nervous/anxious.    All other systems reviewed and are negative.        OBJECTIVE:   /84 (BP Location: Left arm, Patient Position: Chair, Cuff Size: Adult Regular)   Pulse 64   Temp 98  F (36.7  C) (Temporal)   Resp 16   Ht 1.89 m (6' 2.4\")   Wt 115.7 kg (255 lb)   SpO2 98%   BMI 32.39 kg/m      Physical Exam   Constitutional: He appears well-developed and well-nourished.   HENT:   Head: Normocephalic and atraumatic.   Right Ear: External ear normal.   Left Ear: External ear normal.   Nose: Nose normal.   Mouth/Throat: Oropharynx is clear and moist. No oropharyngeal exudate.   Eyes: EOM are normal.   Neck: Neck supple.   Cardiovascular: Normal rate, regular rhythm, normal heart sounds and intact distal pulses. Exam reveals no gallop and no friction rub.   No murmur heard.  Pulmonary/Chest: Effort normal and breath sounds normal.   Psychiatric: He has a normal mood and affect. His behavior is normal. Judgment and thought content normal.         Diagnostic Test " Results:  Labs reviewed in Epic    ASSESSMENT/PLAN:       ICD-10-CM    1. Routine general medical examination at a health care facility Z00.00    2. Severe persistent asthma without complication J45.50 beclomethasone HFA (QVAR REDIHALER) 80 MCG/ACT inhaler   3. Asthma in adult, moderate persistent, with acute exacerbation J45.41 albuterol (VENTOLIN HFA) 108 (90 Base) MCG/ACT inhaler   4. Thyroid nodules E04.1    5. Hyperlipidemia LDL goal <160 E78.5    6. Hypertension, unspecified type I10 BASIC METABOLIC PANEL     TSH WITH FREE T4 REFLEX     Lipid panel reflex to direct LDL Fasting   7. HTN, goal below 140/90 I10 lisinopril (PRINIVIL/ZESTRIL) 40 MG tablet   8. History of pulmonary embolism Z86.711    9. History of shingles Z86.19    10. History of thyroid cancer Z85.850    11. Moderate persistent asthma with acute exacerbation J45.41    12. 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 Recheck TSH annually         History of pulmonary embolism     Pt wishes to have a letter stating the cause of his PE. The hypercoagulability work up was negative. He used to work as an [employer]  at that time and was sitting continuously for >18 hrs per day which could have contributed. Will fill out forms as they are received         Moderate persistent asthma with acute exacerbation     Well controlled on the current dose of qvar and  "albuterol         Relevant Medications    beclomethasone HFA (QVAR REDIHALER) 80 MCG/ACT inhaler    albuterol (VENTOLIN HFA) 108 (90 Base) MCG/ACT inhaler    History of shingles     Advised Shingrix. He will check coverage before having it done           Other Visit Diagnoses     Routine general medical examination at a health care facility    -  Primary    Severe persistent asthma without complication        Relevant Medications    beclomethasone HFA (QVAR REDIHALER) 80 MCG/ACT inhaler    albuterol (VENTOLIN HFA) 108 (90 Base) MCG/ACT inhaler    Asthma in adult, moderate persistent, with acute exacerbation        Relevant Medications    beclomethasone HFA (QVAR REDIHALER) 80 MCG/ACT inhaler    albuterol (VENTOLIN HFA) 108 (90 Base) MCG/ACT inhaler    HTN, goal below 140/90        Relevant Medications    lisinopril (PRINIVIL/ZESTRIL) 40 MG tablet          COUNSELING:   Reviewed preventive health counseling, as reflected in patient instructions       Regular exercise       Healthy diet/nutrition    Estimated body mass index is 32.39 kg/m  as calculated from the following:    Height as of this encounter: 1.89 m (6' 2.4\").    Weight as of this encounter: 115.7 kg (255 lb).          reports that he has never smoked. He has never used smokeless tobacco.      Counseling Resources:  ATP IV Guidelines  Pooled Cohorts Equation Calculator  FRAX Risk Assessment  ICSI Preventive Guidelines  Dietary Guidelines for Americans, 2010  USDA's MyPlate  ASA Prophylaxis  Lung CA Screening    Monica Odell MD  [facility]  "

## 2019-08-06 ASSESSMENT — ENCOUNTER SYMPTOMS
SHORTNESS OF BREATH: 0
HEMATOCHEZIA: 0
HEADACHES: 0
PARESTHESIAS: 0
CONSTIPATION: 0
MYALGIAS: 0
DIARRHEA: 0
COUGH: 0
EYE PAIN: 0
ABDOMINAL PAIN: 0
HEMATURIA: 0
ARTHRALGIAS: 0
WEAKNESS: 0
CHILLS: 0
FEVER: 0
NERVOUS/ANXIOUS: 0
FREQUENCY: 0
JOINT SWELLING: 0
DYSURIA: 0
NAUSEA: 0
DIZZINESS: 0
HEARTBURN: 0
PALPITATIONS: 0
SORE THROAT: 0

## 2019-08-08 ENCOUNTER — OFFICE VISIT (OUTPATIENT)
Dept: FAMILY MEDICINE | Facility: OTHER | Age: 54
End: 2019-08-08
Payer: COMMERCIAL

## 2019-08-08 VITALS
BODY MASS INDEX: 32.73 KG/M2 | OXYGEN SATURATION: 98 % | SYSTOLIC BLOOD PRESSURE: 134 MMHG | HEIGHT: 74 IN | TEMPERATURE: 98 F | DIASTOLIC BLOOD PRESSURE: 84 MMHG | WEIGHT: 255 LBS | HEART RATE: 64 BPM | RESPIRATION RATE: 16 BRPM

## 2019-08-08 DIAGNOSIS — Z86.19 HISTORY OF SHINGLES: ICD-10-CM

## 2019-08-08 DIAGNOSIS — Q16.9: ICD-10-CM

## 2019-08-08 DIAGNOSIS — I10 HTN, GOAL BELOW 140/90: ICD-10-CM

## 2019-08-08 DIAGNOSIS — Z85.850 HISTORY OF THYROID CANCER: ICD-10-CM

## 2019-08-08 DIAGNOSIS — Z00.00 ROUTINE GENERAL MEDICAL EXAMINATION AT A HEALTH CARE FACILITY: Primary | ICD-10-CM

## 2019-08-08 DIAGNOSIS — J45.50 SEVERE PERSISTENT ASTHMA WITHOUT COMPLICATION (H): ICD-10-CM

## 2019-08-08 DIAGNOSIS — J45.41 MODERATE PERSISTENT ASTHMA WITH ACUTE EXACERBATION: ICD-10-CM

## 2019-08-08 DIAGNOSIS — I10 HYPERTENSION, UNSPECIFIED TYPE: ICD-10-CM

## 2019-08-08 DIAGNOSIS — J45.41 ASTHMA IN ADULT, MODERATE PERSISTENT, WITH ACUTE EXACERBATION: ICD-10-CM

## 2019-08-08 DIAGNOSIS — Z86.711 HISTORY OF PULMONARY EMBOLISM: ICD-10-CM

## 2019-08-08 DIAGNOSIS — E78.5 HYPERLIPIDEMIA LDL GOAL <160: ICD-10-CM

## 2019-08-08 DIAGNOSIS — E04.1 THYROID NODULE: ICD-10-CM

## 2019-08-08 PROCEDURE — 99396 PREV VISIT EST AGE 40-64: CPT | Performed by: FAMILY MEDICINE

## 2019-08-08 RX ORDER — ALBUTEROL SULFATE 90 UG/1
AEROSOL, METERED RESPIRATORY (INHALATION)
Qty: 18 G | Refills: 3 | Status: SHIPPED | OUTPATIENT
Start: 2019-08-08 | End: 2021-01-05

## 2019-08-08 RX ORDER — LISINOPRIL 40 MG/1
40 TABLET ORAL DAILY
Qty: 90 TABLET | Refills: 3 | Status: SHIPPED | OUTPATIENT
Start: 2019-08-08 | End: 2020-06-05

## 2019-08-08 ASSESSMENT — ENCOUNTER SYMPTOMS
HEADACHES: 0
DIARRHEA: 0
CHILLS: 0
FREQUENCY: 0
DYSURIA: 0
COUGH: 0
ABDOMINAL PAIN: 0
ARTHRALGIAS: 0
JOINT SWELLING: 0
EYE PAIN: 0
HEMATURIA: 0
HEMATOCHEZIA: 0
NERVOUS/ANXIOUS: 0
NAUSEA: 0
PALPITATIONS: 0
DIZZINESS: 0
WEAKNESS: 0
SHORTNESS OF BREATH: 0
FEVER: 0
PARESTHESIAS: 0
HEARTBURN: 0
SORE THROAT: 0
CONSTIPATION: 0
MYALGIAS: 0

## 2019-08-08 ASSESSMENT — PAIN SCALES - GENERAL: PAINLEVEL: NO PAIN (0)

## 2019-08-08 ASSESSMENT — MIFFLIN-ST. JEOR: SCORE: 2067.77

## 2019-08-08 NOTE — ASSESSMENT & PLAN NOTE
Well-controlled on the current medications- lisinopril-40mg daily   Update chemistries and thyroid tests today

## 2019-08-08 NOTE — ASSESSMENT & PLAN NOTE
Pt wishes to have a letter stating the cause of his PE. The hypercoagulability work up was negative. He used to work as an [employer]  at that time and was sitting continuously for >18 hrs per day which could have contributed. Will fill out forms as they are received

## 2019-08-12 DIAGNOSIS — I10 HYPERTENSION, UNSPECIFIED TYPE: ICD-10-CM

## 2019-08-12 LAB
ANION GAP SERPL CALCULATED.3IONS-SCNC: 8 MMOL/L (ref 3–14)
BUN SERPL-MCNC: 14 MG/DL (ref 7–30)
CALCIUM SERPL-MCNC: 8.7 MG/DL (ref 8.5–10.1)
CHLORIDE SERPL-SCNC: 108 MMOL/L (ref 94–109)
CHOLEST SERPL-MCNC: 177 MG/DL
CO2 SERPL-SCNC: 26 MMOL/L (ref 20–32)
CREAT SERPL-MCNC: 0.91 MG/DL (ref 0.66–1.25)
GFR SERPL CREATININE-BSD FRML MDRD: >90 ML/MIN/{1.73_M2}
GLUCOSE SERPL-MCNC: 90 MG/DL (ref 70–99)
HDLC SERPL-MCNC: 50 MG/DL
LDLC SERPL CALC-MCNC: 104 MG/DL
NONHDLC SERPL-MCNC: 127 MG/DL
POTASSIUM SERPL-SCNC: 4.3 MMOL/L (ref 3.4–5.3)
SODIUM SERPL-SCNC: 142 MMOL/L (ref 133–144)
TRIGL SERPL-MCNC: 113 MG/DL
TSH SERPL DL<=0.005 MIU/L-ACNC: 2.86 MU/L (ref 0.4–4)

## 2019-08-12 PROCEDURE — 80061 LIPID PANEL: CPT | Performed by: FAMILY MEDICINE

## 2019-08-12 PROCEDURE — 84443 ASSAY THYROID STIM HORMONE: CPT | Performed by: FAMILY MEDICINE

## 2019-08-12 PROCEDURE — 80048 BASIC METABOLIC PNL TOTAL CA: CPT | Performed by: FAMILY MEDICINE

## 2019-08-12 PROCEDURE — 36415 COLL VENOUS BLD VENIPUNCTURE: CPT | Performed by: FAMILY MEDICINE

## 2019-09-16 ENCOUNTER — MYC MEDICAL ADVICE (OUTPATIENT)
Dept: FAMILY MEDICINE | Facility: OTHER | Age: 54
End: 2019-09-16

## 2019-09-17 ENCOUNTER — TELEPHONE (OUTPATIENT)
Dept: FAMILY MEDICINE | Facility: OTHER | Age: 54
End: 2019-09-17

## 2019-09-17 NOTE — TELEPHONE ENCOUNTER
Reason for Call:  Form, our goal is to have forms completed with 72 hours, however, some forms may require a visit or additional information.    Type of letter, form or note:  [employer]    Who is the form from?: [employer] (if other please explain)    Where did the form come from: form was faxed in    What clinic location was the form placed at?: [facility] - [phone]    Where the form was placed: box Box/Folder    What number is listed as a contact on the form?: [phone]       Additional comments: fax [phone]    Call taken on 9/17/2019 at 11:56 AM by Meenu Zuniga

## 2019-10-10 ENCOUNTER — MYC MEDICAL ADVICE (OUTPATIENT)
Dept: FAMILY MEDICINE | Facility: OTHER | Age: 54
End: 2019-10-10

## 2019-10-10 NOTE — TELEPHONE ENCOUNTER
Pingboard message sent to patient.  Cadence Monterroso CMA ([facility])

## 2019-10-26 ENCOUNTER — HEALTH MAINTENANCE LETTER (OUTPATIENT)
Age: 54
End: 2019-10-26

## 2020-02-17 ENCOUNTER — MYC MEDICAL ADVICE (OUTPATIENT)
Dept: FAMILY MEDICINE | Facility: OTHER | Age: 55
End: 2020-02-17

## 2020-02-18 ENCOUNTER — ALLIED HEALTH/NURSE VISIT (OUTPATIENT)
Dept: FAMILY MEDICINE | Facility: OTHER | Age: 55
End: 2020-02-18
Payer: COMMERCIAL

## 2020-02-18 VITALS — SYSTOLIC BLOOD PRESSURE: 126 MMHG | DIASTOLIC BLOOD PRESSURE: 83 MMHG | HEART RATE: 66 BPM

## 2020-02-18 DIAGNOSIS — I10 HYPERTENSION, UNSPECIFIED TYPE: Primary | ICD-10-CM

## 2020-02-18 PROCEDURE — 99207 ZZC NO CHARGE NURSE ONLY: CPT

## 2020-02-18 NOTE — TELEPHONE ENCOUNTER
BP Readings from Last 3 Encounters:   08/08/19 134/84   07/06/18 134/88   03/08/18 138/84     Replied to patient via [employer].    Malena Lao, BERNARDON, RN, PHN

## 2020-02-18 NOTE — PROGRESS NOTES
FYI BP's for RK:  Does patient need to make any changes to medication? Takes lisinopril 40mg around 2 hours everyday after he wakes up. First BP was 137/83  Parker Hunt is a 54 year old patient who comes in today for a Blood Pressure check.  Initial BP:  /83   Pulse 66      66  Disposition: results routed to provider        MA Documentation Note:  Patient is here for a BP check. First BP was 137/83 pulse 66. He is not having any symptoms. He just recently started checking his BP at home, one of his friends just had a heart  and got him thinking of checking his BP. He says his BP's have been running a little higher at home, see mychart encounter. He is currently not having any symptoms. One question he has is that his BP ran higher this AM when he woke up 170/110ish, is this something he should be concerned about. Huddled with ES, told to wait for a recheck, if it is still below 140/90 does not need to see provider but should send message to her to see if needing any changes to medications. Patient would like to wait until after April 1st to come in for an appointment.   Tawana Garcia MA

## 2020-02-18 NOTE — TELEPHONE ENCOUNTER
Patient is scheduled for a nurse only BP check today as recommended.  Next 5 appointments (look out 90 days)    Feb 18, 2020  3:00 PM CST  Nurse Only with NL MESSIAT TEAM B, [facility] [address] [address]  [phone]      Closing encounter.    Malena Lao, BERNARDON, RN, PHN

## 2020-03-24 ENCOUNTER — VIRTUAL VISIT (OUTPATIENT)
Dept: FAMILY MEDICINE | Facility: OTHER | Age: 55
End: 2020-03-24
Payer: COMMERCIAL

## 2020-03-24 ENCOUNTER — MYC MEDICAL ADVICE (OUTPATIENT)
Dept: FAMILY MEDICINE | Facility: OTHER | Age: 55
End: 2020-03-24

## 2020-03-24 DIAGNOSIS — J45.31 MILD PERSISTENT ASTHMA WITH ACUTE EXACERBATION: Primary | ICD-10-CM

## 2020-03-24 PROCEDURE — 99213 OFFICE O/P EST LOW 20 MIN: CPT | Mod: TEL | Performed by: FAMILY MEDICINE

## 2020-03-24 RX ORDER — PREDNISONE 20 MG/1
40 TABLET ORAL DAILY
Qty: 10 TABLET | Refills: 0 | Status: SHIPPED | OUTPATIENT
Start: 2020-03-24 | End: 2020-09-21

## 2020-03-24 NOTE — TELEPHONE ENCOUNTER
Last read by Parker Hunt at 10:25 AM on 3/24/2020.     Next 5 appointments (look out 90 days)    Mar 24, 2020 11:20 AM CDT  [facility]phone [facility]ell [facility]  [facility] [address] [facility] [address]  [facility] [address]  [phone]        Tawana Garcia MA

## 2020-03-24 NOTE — PROGRESS NOTES
"Parker Hunt is a 54 year old male who is being evaluated via a billable telephone visit.      The patient has been notified of following:     \"This telephone visit will be conducted via a call between you and your physician/provider. We have found that certain health care needs can be provided without the need for a physical exam.  This service lets us provide the care you need with a short phone conversation.  If a prescription is necessary we can send it directly to your pharmacy.  If lab work is needed we can place an order for that and you can then stop by our lab to have the test done at a later time.    If during the course of the call the physician/provider feels a telephone visit is not appropriate, you will not be charged for this service.\"     Parker Hunt complains of    Chief Complaint   Patient presents with     Cough       I have reviewed and updated the patient's Past Medical History, Social History, Family History and Medication List.    ALLERGIES  No known drug allergies    Asthma Follow-Up  Was ACT completed today?    Yes    ACT Total Scores 3/24/2020   ACT TOTAL SCORE -   ASTHMA ER VISITS -   ASTHMA HOSPITALIZATIONS -   ACT TOTAL SCORE (Goal Greater than or Equal to 20) 13   In the past 12 months, how many times did you visit the emergency room for your asthma without being admitted to the hospital? 0   In the past 12 months, how many times were you hospitalized overnight because of your asthma? 0       How many days per week do you miss taking your asthma controller medication?  0    Please describe any recent triggers for your asthma: upper respiratory infections, animal dander, exercise or sports and cold air    Have you had any Emergency Room Visits, Urgent Care Visits, or Hospital Admissions since your last office visit?  No    Additional provider notes:     Assessment/Plan:  1. Mild persistent asthma with acute exacerbation  Symptoms concerning for asthma exacerbation   Trail " prednisone burst course  Discussed home care  Reportable signs and symptoms discussed  RTC if symptoms persist or fail to improve    - predniSONE (DELTASONE) 20 MG tablet; Take 2 tablets (40 mg) by mouth daily  Dispense: 10 tablet; Refill: 0    Phone call duration: 5  minutes    Monica Odell MD

## 2020-03-25 ASSESSMENT — ASTHMA QUESTIONNAIRES: ACT_TOTALSCORE: 13

## 2020-04-01 ENCOUNTER — MYC MEDICAL ADVICE (OUTPATIENT)
Dept: FAMILY MEDICINE | Facility: OTHER | Age: 55
End: 2020-04-01

## 2020-04-01 DIAGNOSIS — J45.50 SEVERE PERSISTENT ASTHMA WITHOUT COMPLICATION (H): ICD-10-CM

## 2020-04-01 RX ORDER — BECLOMETHASONE DIPROPIONATE HFA 80 UG/1
AEROSOL, METERED RESPIRATORY (INHALATION)
Qty: 31.8 G | Refills: 1 | OUTPATIENT
Start: 2020-04-01

## 2020-04-01 RX ORDER — BECLOMETHASONE DIPROPIONATE HFA 80 UG/1
2 AEROSOL, METERED RESPIRATORY (INHALATION) 2 TIMES DAILY
Qty: 31.8 G | Refills: 0 | Status: SHIPPED | OUTPATIENT
Start: 2020-04-01 | End: 2020-09-01

## 2020-04-01 NOTE — TELEPHONE ENCOUNTER
Pending Prescriptions:                       Disp   Refills    beclomethasone HFA (QVAR REDIHALER) 80 MCG*31.8 g 5        Sig: Inhale 2 puffs into the lungs 2 times daily      Routing refill request to provider for review/approval because:  Needs ACT, See patient notes.    Last Written Prescription Date:  8/8/2019  Last Fill Quantity: 31.8,  # refills: 5   Last office visit: 3/24/2020 with prescribing provider:     Future Office Visit:      Sakshi Villarreal RN BSN

## 2020-04-03 ENCOUNTER — MYC MEDICAL ADVICE (OUTPATIENT)
Dept: FAMILY MEDICINE | Facility: OTHER | Age: 55
End: 2020-04-03

## 2020-06-05 DIAGNOSIS — I10 HTN, GOAL BELOW 140/90: ICD-10-CM

## 2020-06-05 RX ORDER — LISINOPRIL 40 MG/1
TABLET ORAL
Qty: 90 TABLET | Refills: 0 | Status: SHIPPED | OUTPATIENT
Start: 2020-06-05 | End: 2020-09-21

## 2020-06-05 NOTE — TELEPHONE ENCOUNTER
Prescription approved per [facility] Refill Protocol.  Jimbo Erazo, RN, BSN

## 2020-06-25 ENCOUNTER — MYC MEDICAL ADVICE (OUTPATIENT)
Dept: FAMILY MEDICINE | Facility: OTHER | Age: 55
End: 2020-06-25

## 2020-08-29 DIAGNOSIS — J45.50 SEVERE PERSISTENT ASTHMA WITHOUT COMPLICATION (H): ICD-10-CM

## 2020-09-01 RX ORDER — BECLOMETHASONE DIPROPIONATE HFA 80 UG/1
AEROSOL, METERED RESPIRATORY (INHALATION)
Qty: 31.8 G | Refills: 0 | Status: SHIPPED | OUTPATIENT
Start: 2020-09-01 | End: 2020-09-21

## 2020-09-01 NOTE — TELEPHONE ENCOUNTER
Pending Prescriptions:                       Disp   Refills    QVAR REDIHALER 80 MCG/ACT inhaler [Pharmac*31.8 g 0        Sig: TAKE 2 PUFFS BY MOUTH TWICE A DAY      Support Staff:   Please call patient to schedule a visit. (F2F, Video, Phone or E-visit) - Needs ACT  Then ask if patient will need a refill of the above medication before the visit.   Please re-flag for RN and then route to refill pool to give a 30 or 90 day [place] to get them to their visit or to remove the medication and to close the encounter.    After 3 attempts to reach patient, please route to provider to review and advise.    Thank you!       Sakshi Villarreal RN BSN

## 2020-09-08 DIAGNOSIS — I10 HTN, GOAL BELOW 140/90: ICD-10-CM

## 2020-09-10 NOTE — TELEPHONE ENCOUNTER
Pending Prescriptions:                       Disp   Refills    lisinopril (ZESTRIL) 40 MG tablet [Pharmac*90 tab*0        Sig: TAKE ONE TABLET BY MOUTH once daily      Support Staff:   Please call patient to schedule a visit. (F2F, Video, Phone or E-visit) med check and labs  Then ask if patient will need a refill of the above medication before the visit.   Please re-flag for RN and then route to refill pool to give a 30 or 90 day [place] to get them to their visit or to remove the medication and to close the encounter.    After 3 attempts to reach patient, please route to provider to review and advise.    Thank you!    Jimbo Erazo, BSN, RN, PHN

## 2020-09-16 RX ORDER — LISINOPRIL 40 MG/1
TABLET ORAL
Qty: 90 TABLET | Refills: 0 | OUTPATIENT
Start: 2020-09-16

## 2020-09-16 NOTE — TELEPHONE ENCOUNTER
Called patient today, reviewed the notes below, Scheduled labs and physical for 9/21/20. Patient stated that he had enough medication. Thank you

## 2020-09-17 ENCOUNTER — TELEPHONE (OUTPATIENT)
Dept: FAMILY MEDICINE | Facility: OTHER | Age: 55
End: 2020-09-17

## 2020-09-17 DIAGNOSIS — I10 HYPERTENSION, UNSPECIFIED TYPE: ICD-10-CM

## 2020-09-17 DIAGNOSIS — E78.5 HYPERLIPIDEMIA LDL GOAL <160: Primary | ICD-10-CM

## 2020-09-17 NOTE — TELEPHONE ENCOUNTER
This patient has an appointment for lab work but does not have any orders. Please place some future orders as needed.    Thank You,  Charmaine Agustin MLT (ASCP)

## 2020-09-17 NOTE — PATIENT INSTRUCTIONS
Shingrix vaccine      Preventive Health Recommendations  Male Ages 50 - 64    Yearly exam:             See your health care provider every year in order to  o   Review health changes.   o   Discuss preventive care.    o   Review your medicines if your doctor has prescribed any.     Have a cholesterol test every 5 years, or more frequently if you are at risk for high cholesterol/heart disease.     Have a diabetes test (fasting glucose) every three years. If you are at risk for diabetes, you should have this test more often.     Have a colonoscopy at age 50, or have a yearly FIT test (stool test). These exams will check for colon cancer.      Talk with your health care provider about whether or not a prostate cancer screening test (PSA) is right for you.    You should be tested each year for STDs (sexually transmitted diseases), if you re at risk.     Shots: Get a flu shot each year. Get a tetanus shot every 10 years.     Nutrition:    Eat at least 5 servings of fruits and vegetables daily.     Eat whole-grain bread, whole-wheat pasta and brown rice instead of white grains and rice.     Get adequate Calcium and Vitamin D.     Lifestyle    Exercise for at least 150 minutes a week (30 minutes a day, 5 days a week). This will help you control your weight and prevent disease.     Limit alcohol to one drink per day.     No smoking.     Wear sunscreen to prevent skin cancer.     See your dentist every six months for an exam and cleaning.     See your eye doctor every 1 to 2 years.

## 2020-09-21 ENCOUNTER — MYC MEDICAL ADVICE (OUTPATIENT)
Dept: FAMILY MEDICINE | Facility: OTHER | Age: 55
End: 2020-09-21

## 2020-09-21 ENCOUNTER — OFFICE VISIT (OUTPATIENT)
Dept: FAMILY MEDICINE | Facility: OTHER | Age: 55
End: 2020-09-21
Payer: COMMERCIAL

## 2020-09-21 VITALS
DIASTOLIC BLOOD PRESSURE: 80 MMHG | TEMPERATURE: 97.6 F | WEIGHT: 261 LBS | HEIGHT: 74 IN | RESPIRATION RATE: 16 BRPM | HEART RATE: 69 BPM | BODY MASS INDEX: 33.5 KG/M2 | SYSTOLIC BLOOD PRESSURE: 124 MMHG | OXYGEN SATURATION: 98 %

## 2020-09-21 DIAGNOSIS — J45.50 SEVERE PERSISTENT ASTHMA WITHOUT COMPLICATION (H): ICD-10-CM

## 2020-09-21 DIAGNOSIS — I10 HTN, GOAL BELOW 140/90: ICD-10-CM

## 2020-09-21 DIAGNOSIS — M75.80 ROTATOR CUFF TENDINITIS, UNSPECIFIED LATERALITY: Primary | ICD-10-CM

## 2020-09-21 DIAGNOSIS — E78.5 HYPERLIPIDEMIA LDL GOAL <160: ICD-10-CM

## 2020-09-21 DIAGNOSIS — R23.3 BRUISING, SPONTANEOUS: ICD-10-CM

## 2020-09-21 DIAGNOSIS — I10 HYPERTENSION, UNSPECIFIED TYPE: ICD-10-CM

## 2020-09-21 DIAGNOSIS — Z11.4 SCREENING FOR HIV (HUMAN IMMUNODEFICIENCY VIRUS): ICD-10-CM

## 2020-09-21 DIAGNOSIS — Z00.00 ROUTINE GENERAL MEDICAL EXAMINATION AT A HEALTH CARE FACILITY: Primary | ICD-10-CM

## 2020-09-21 LAB
ALBUMIN SERPL-MCNC: 3.7 G/DL (ref 3.4–5)
ALP SERPL-CCNC: 48 U/L (ref 40–150)
ALT SERPL W P-5'-P-CCNC: 24 U/L (ref 0–70)
ANION GAP SERPL CALCULATED.3IONS-SCNC: 6 MMOL/L (ref 3–14)
AST SERPL W P-5'-P-CCNC: 17 U/L (ref 0–45)
BASOPHILS # BLD AUTO: 0.1 10E9/L (ref 0–0.2)
BASOPHILS NFR BLD AUTO: 1 %
BILIRUB SERPL-MCNC: 1.1 MG/DL (ref 0.2–1.3)
BUN SERPL-MCNC: 12 MG/DL (ref 7–30)
CALCIUM SERPL-MCNC: 9.3 MG/DL (ref 8.5–10.1)
CHLORIDE SERPL-SCNC: 108 MMOL/L (ref 94–109)
CHOLEST SERPL-MCNC: 176 MG/DL
CK SERPL-CCNC: 121 U/L (ref 30–300)
CO2 SERPL-SCNC: 27 MMOL/L (ref 20–32)
CREAT SERPL-MCNC: 0.98 MG/DL (ref 0.66–1.25)
CRP SERPL-MCNC: <2.9 MG/L (ref 0–8)
DIFFERENTIAL METHOD BLD: NORMAL
EOSINOPHIL # BLD AUTO: 0.1 10E9/L (ref 0–0.7)
EOSINOPHIL NFR BLD AUTO: 1.2 %
ERYTHROCYTE [DISTWIDTH] IN BLOOD BY AUTOMATED COUNT: 12.7 % (ref 10–15)
ERYTHROCYTE [SEDIMENTATION RATE] IN BLOOD BY WESTERGREN METHOD: 4 MM/H (ref 0–20)
GFR SERPL CREATININE-BSD FRML MDRD: 85 ML/MIN/{1.73_M2}
GLUCOSE SERPL-MCNC: 99 MG/DL (ref 70–99)
HCT VFR BLD AUTO: 42.7 % (ref 40–53)
HDLC SERPL-MCNC: 47 MG/DL
HGB BLD-MCNC: 14.2 G/DL (ref 13.3–17.7)
LDLC SERPL CALC-MCNC: 112 MG/DL
LYMPHOCYTES # BLD AUTO: 1.8 10E9/L (ref 0.8–5.3)
LYMPHOCYTES NFR BLD AUTO: 27.1 %
MCH RBC QN AUTO: 31.1 PG (ref 26.5–33)
MCHC RBC AUTO-ENTMCNC: 33.3 G/DL (ref 31.5–36.5)
MCV RBC AUTO: 93 FL (ref 78–100)
MONOCYTES # BLD AUTO: 0.6 10E9/L (ref 0–1.3)
MONOCYTES NFR BLD AUTO: 8.7 %
NEUTROPHILS # BLD AUTO: 4.2 10E9/L (ref 1.6–8.3)
NEUTROPHILS NFR BLD AUTO: 62 %
NONHDLC SERPL-MCNC: 129 MG/DL
PLATELET # BLD AUTO: 216 10E9/L (ref 150–450)
POTASSIUM SERPL-SCNC: 4.3 MMOL/L (ref 3.4–5.3)
PROT SERPL-MCNC: 6.7 G/DL (ref 6.8–8.8)
RBC # BLD AUTO: 4.57 10E12/L (ref 4.4–5.9)
SODIUM SERPL-SCNC: 141 MMOL/L (ref 133–144)
TRIGL SERPL-MCNC: 83 MG/DL
TSH SERPL DL<=0.005 MIU/L-ACNC: 2.62 MU/L (ref 0.4–4)
WBC # BLD AUTO: 6.8 10E9/L (ref 4–11)

## 2020-09-21 PROCEDURE — 85652 RBC SED RATE AUTOMATED: CPT | Performed by: FAMILY MEDICINE

## 2020-09-21 PROCEDURE — 99396 PREV VISIT EST AGE 40-64: CPT | Performed by: FAMILY MEDICINE

## 2020-09-21 PROCEDURE — 99213 OFFICE O/P EST LOW 20 MIN: CPT | Mod: 25 | Performed by: FAMILY MEDICINE

## 2020-09-21 PROCEDURE — 86140 C-REACTIVE PROTEIN: CPT | Performed by: FAMILY MEDICINE

## 2020-09-21 PROCEDURE — 86038 ANTINUCLEAR ANTIBODIES: CPT | Performed by: FAMILY MEDICINE

## 2020-09-21 PROCEDURE — 36415 COLL VENOUS BLD VENIPUNCTURE: CPT | Performed by: FAMILY MEDICINE

## 2020-09-21 PROCEDURE — 82550 ASSAY OF CK (CPK): CPT | Performed by: FAMILY MEDICINE

## 2020-09-21 PROCEDURE — 80061 LIPID PANEL: CPT | Performed by: FAMILY MEDICINE

## 2020-09-21 PROCEDURE — 80050 GENERAL HEALTH PANEL: CPT | Performed by: FAMILY MEDICINE

## 2020-09-21 PROCEDURE — 86255 FLUORESCENT ANTIBODY SCREEN: CPT | Performed by: FAMILY MEDICINE

## 2020-09-21 RX ORDER — BECLOMETHASONE DIPROPIONATE HFA 80 UG/1
AEROSOL, METERED RESPIRATORY (INHALATION)
Qty: 31.8 G | Refills: 5 | Status: SHIPPED | OUTPATIENT
Start: 2020-09-21 | End: 2021-10-14

## 2020-09-21 RX ORDER — LISINOPRIL 40 MG/1
40 TABLET ORAL DAILY
Qty: 90 TABLET | Refills: 1 | Status: SHIPPED | OUTPATIENT
Start: 2020-09-21 | End: 2021-03-23

## 2020-09-21 ASSESSMENT — MIFFLIN-ST. JEOR: SCORE: 2084.99

## 2020-09-22 LAB
ANA SER QL IF: NEGATIVE
ANCA AB PATTERN SER IF-IMP: NORMAL
C-ANCA TITR SER IF: NORMAL {TITER}

## 2020-09-22 ASSESSMENT — ASTHMA QUESTIONNAIRES: ACT_TOTALSCORE: 22

## 2020-10-05 ENCOUNTER — MYC MEDICAL ADVICE (OUTPATIENT)
Dept: FAMILY MEDICINE | Facility: OTHER | Age: 55
End: 2020-10-05

## 2020-10-05 NOTE — TELEPHONE ENCOUNTER
I would reassure him that they could be due to his aspirin use and should not be concerned if they are resolving with out any intervention

## 2020-10-05 NOTE — TELEPHONE ENCOUNTER
Provider Please Advise:     See [employer] message and attached photo.     Sakshi Villarreal RN BSN

## 2020-10-13 ENCOUNTER — THERAPY VISIT (OUTPATIENT)
Dept: PHYSICAL THERAPY | Facility: CLINIC | Age: 55
End: 2020-10-13
Attending: FAMILY MEDICINE
Payer: COMMERCIAL

## 2020-10-13 DIAGNOSIS — M75.80 ROTATOR CUFF TENDINITIS, UNSPECIFIED LATERALITY: ICD-10-CM

## 2020-10-13 DIAGNOSIS — M25.512 CHRONIC LEFT SHOULDER PAIN: ICD-10-CM

## 2020-10-13 DIAGNOSIS — G89.29 CHRONIC LEFT SHOULDER PAIN: ICD-10-CM

## 2020-10-13 PROCEDURE — 97110 THERAPEUTIC EXERCISES: CPT | Mod: GP | Performed by: PHYSICAL THERAPIST

## 2020-10-13 PROCEDURE — 97161 PT EVAL LOW COMPLEX 20 MIN: CPT | Mod: GP | Performed by: PHYSICAL THERAPIST

## 2020-10-13 NOTE — PROGRESS NOTES
[facility] Initial Evaluation  Subjective:  The history is provided by the patient. No  was used.   Patient Health History  Parker Hunt being seen for L Shoulder Pain.     Date of Onset: Over a Year Ago.   Problem occurred: Unknown   Pain score: ranges 0-8/10.  General health as reported by patient is excellent.  Pertinent medical history includes: cancer, asthma, overweight and high blood pressure.   Red flags:  None as reported by patient.  Medical allergies: none.   Surgeries include:  Cancer surgery. Other surgery history details: Thyroidectomy.    Current medications:  High blood pressure medication and steroids.    Current occupation is Computer Programer.   Primary job tasks include:  Computer work.                  Therapist Generated HPI Evaluation         Type of problem:  Left shoulder.    This is a chronic condition.  Condition occurred with:  Unknown cause.  Where condition occurred: for unknown reasons.  Patient reports pain:  Anterior and posterior.  Pain is described as aching (stiffness) and is intermittent.    Since onset symptoms are gradually worsening.  Symptoms are exacerbated by using arm behind back (reaching out to side and back)  Relieved by: avoid reaching out and back.  Imaging testing: no imaging.    Restrictions due to condition include:  Working in normal job without restrictions.  Barriers include:  None as reported by patient.                        Objective:  Standing Alignment:      Shoulder/UE:  Rounded shoulders and protracted scapula L                  Flexibility/Screens:   Positive screens:  Shoulder  Upper Extremity:    Decreased left upper extremity flexibility at:  Pectoralis Major and Pectoralis Minor    Decreased right upper extremity flexibility present at:  Pectoralis Major and Pectoralis Minor                           Shoulder Evaluation:  ROM:  AROM:    Flexion:  Left:  155    Right:  155    Abduction:  Left: 155+ (tight)    Right:  160                Flexion/External Rotation:  Left:  T3    Right:  T3  Extension/Internal Rotation:  Left:  T10    Right:  T8    PROM:    Flexion:  Left:  160          Abduction:  Left:  160+          External Rotation:  Left:  60                        Strength:  : Normal but pain with resisted ABD at 90 degrees.                      Stability Testing:  not assessed      Special Tests:    Left shoulder positive for the following special tests:  Impingement  Left shoulder negative for the following special tests:  Labral; Bursal and Rotator cuff tear    Palpation:    Left shoulder tenderness present at:  Supraspinatus  Left shoulder tenderness not present at: Infraspinatus; Teres Minor; Subscapularis; Deltoid or Bicipital Groove    Mobility Tests:  not assessed                                                 General     ROS    Assessment/Plan:    Patient is a 55 year old male with left side shoulder complaints.    Patient has the following significant findings with corresponding treatment plan.                Diagnosis 1:  L Shoulder Impingement  Pain -  manual therapy, self management, education and home program  Decreased ROM/flexibility - manual therapy, therapeutic exercise and home program  Decreased strength - therapeutic exercise, therapeutic activities and home program  Inflammation - cold therapy, US and self management/home program  Impaired muscle performance - neuro re-education and home program  Decreased function - therapeutic activities and home program    Therapy Evaluation Codes:   1) History comprised of:   Personal factors that impact the plan of care:      Time since onset of symptoms.    Comorbidity factors that impact the plan of care are:      Asthma, Cancer, Overweight and Thyroid.     Medications impacting care: High blood pressure and Steroids.  2) Examination of Body Systems comprised of:   Body structures and functions that impact the plan of care:      Shoulder.   Activity  limitations that impact the plan of care are:      Reaching out to side and back .  3) Clinical presentation characteristics are:   Stable/Uncomplicated.  4) Decision-Making    Low complexity using standardized patient assessment instrument and/or measureable assessment of functional outcome.  Cumulative Therapy Evaluation is: Low complexity.    Previous and current functional limitations:  (See Goal Flow Sheet for this information)    Short term and Long term goals: (See Goal Flow Sheet for this information)     Communication ability:  Patient appears to be able to clearly communicate and understand verbal and written communication and follow directions correctly.  Treatment Explanation - The following has been discussed with the patient:   RX ordered/plan of care  Anticipated outcomes  Possible risks and side effects  This patient would benefit from PT intervention to resume normal activities.   Rehab potential is good.    Frequency:  1 X week, once daily  Duration:  for 6 weeks  Discharge Plan:  Achieve all LTG.  Independent in home treatment program.  Return to previous functional level by discharge.  Reach maximal therapeutic benefit.    Please refer to the daily flowsheet for treatment today, total treatment time and time spent performing 1:1 timed codes.

## 2020-10-20 ENCOUNTER — THERAPY VISIT (OUTPATIENT)
Dept: PHYSICAL THERAPY | Facility: CLINIC | Age: 55
End: 2020-10-20
Payer: COMMERCIAL

## 2020-10-20 DIAGNOSIS — G89.29 CHRONIC LEFT SHOULDER PAIN: ICD-10-CM

## 2020-10-20 DIAGNOSIS — M25.512 CHRONIC LEFT SHOULDER PAIN: ICD-10-CM

## 2020-10-20 PROCEDURE — 97140 MANUAL THERAPY 1/> REGIONS: CPT | Mod: GP | Performed by: PHYSICAL THERAPIST

## 2020-10-20 PROCEDURE — 97112 NEUROMUSCULAR REEDUCATION: CPT | Mod: GP | Performed by: PHYSICAL THERAPIST

## 2020-10-20 PROCEDURE — 97110 THERAPEUTIC EXERCISES: CPT | Mod: GP | Performed by: PHYSICAL THERAPIST

## 2020-10-26 ENCOUNTER — OFFICE VISIT (OUTPATIENT)
Dept: AUDIOLOGY | Facility: OTHER | Age: 55
End: 2020-10-26
Payer: COMMERCIAL

## 2020-10-26 DIAGNOSIS — H90.3 SENSORINEURAL HEARING LOSS, BILATERAL: Primary | ICD-10-CM

## 2020-10-26 PROCEDURE — 92557 COMPREHENSIVE HEARING TEST: CPT | Performed by: AUDIOLOGIST

## 2020-10-26 PROCEDURE — 92591 PR HEARING AID EXAM BINAURAL: CPT | Performed by: AUDIOLOGIST

## 2020-10-26 PROCEDURE — 99207 PR NO CHARGE LOS: CPT | Performed by: AUDIOLOGIST

## 2020-10-26 PROCEDURE — 92550 TYMPANOMETRY & REFLEX THRESH: CPT | Performed by: AUDIOLOGIST

## 2020-10-26 NOTE — PROGRESS NOTES
AUDIOLOGY REPORT    SUBJECTIVE:  Parker Hunt is a 55 year old male who was seen in the Audiology Clinic at the [facility] for audiologic evaluation, referred by self. The patient reports that he is not hearing as well as he used to, but he is not sure if his hearing has changed or if his hearing are are not working well. The patient wears  in canal hearing aids from [employer]. The patient also reports constant bilateral tinnitus. The patient denies ear pain, pressure, history of noise exposure, and history of ear problems or ear surgery. The patient notes difficulty with communication in a variety of listening situations. The patient was unaccompanied to today's appointment.     OBJECTIVE:  Otoscopic exam indicates ears are clear of cerumen bilaterally     Pure Tone Thresholds assessed using conventional audiometry with good  reliability from 250-8000 Hz bilaterally using insert earphones and circumaural headphones     RIGHT:  normal hearing sensitivity through 1000 Hz sloping to mild to moderately severe sensorineural hearing loss    LEFT:    normal hearing sensitivity through 1000 Hz sloping to mild to moderately severe sensorineural hearing loss    Tympanogram:    RIGHT: normal eardrum mobility    LEFT:   normal eardrum mobility    Reflexes (reported by stimulus ear):  RIGHT: Ipsilateral is present at elevated levels   RIGHT: Contralateral is absent at frequencies tested  LEFT:   Ipsilateral is present at elevated levels  LEFT:   Contralateral is present at elevated levels     Speech Reception Threshold:    RIGHT: 15 dB HL    LEFT:   20 dB HL    Word Recognition Score:     RIGHT: 100% at 70 dB HL using NU-6 recorded word list.    LEFT:   96% at 70 dB HL using NU-6 recorded word list.      ASSESSMENT:     ICD-10-CM    1. Sensorineural hearing loss, bilateral  H90.3 Cmprhn Audiometry Thrshld Eval & Speech Recog (35796)     Tymps / Reflex   ([id number])     Hearing Aid Exam, Binaural  ([id number])       Today s results were discussed with the patient in detail.     PLAN:  Patient was counseled regarding hearing loss and impact on communication. Patient is a good candidate for amplification at this time and is interested in new amplification.      Patient is a hearing aid candidate. Patient would like to move forward with a hearing aid evaluation today. Therefore, the patient was presented with different options for amplification to help aid in communication. Discussed styles, levels of technology and monaural vs. binaural fitting. The patient is interested in rechargeable hearing aids that can connect directly to his Android phone. He also has concerns about wind noise and understanding speech in echo.    The hearing aid(s) mutually chosen were:  Binaural: Phonak Audeo P90-RT  COLOR: Champagne  BATTERY SIZE: rechargeable  EARMOLD/TIPS: medium open domes (closed if necessary to meet targets or minimize feedback)  CANAL/ LENGTH: 2S    Reviewed purchase information and warranty information with patient. The 45 day trial period was explained to patient. The patient was given a copy of the [place] Department of [employer] consumer brochure on purchasing hearing instruments. Patient risk factors have been provided to the patient in writing prior to the sale of the hearing aid per FDA regulation. The risk factors are also available in the User Instructional Booklet to be presented on the day of the hearing aid fitting. Hearing aid evaluation completed.     The hearing aids have not been ordered yet because hearing aid benefits need to be verified first. The patient would like me to order the 90 level hearing aids if he has any benefits, otherwise he would like me to inform him via [employer] message and he will choose between the 90 and 70 levels.    Parker is scheduled to return in 1-2 weeks for a hearing aid fitting and programming. Purchase agreement will be completed on that date. Please contact this  clinic with any questions or concerns.      Heath Ramesh, CCC-A  [place] Licensed Audiologist #[id number]  10/26/2020

## 2020-11-05 ENCOUNTER — OFFICE VISIT (OUTPATIENT)
Dept: AUDIOLOGY | Facility: OTHER | Age: 55
End: 2020-11-05
Payer: COMMERCIAL

## 2020-11-05 DIAGNOSIS — H90.3 BILATERAL SENSORINEURAL HEARING LOSS: Primary | ICD-10-CM

## 2020-11-05 PROCEDURE — 99207 PR NO CHARGE LOS: CPT | Performed by: AUDIOLOGIST

## 2020-11-05 PROCEDURE — 92593 PR HEARING AID CHECK, BINAURAL: CPT | Performed by: AUDIOLOGIST

## 2020-11-05 PROCEDURE — V5160 DISPENSING FEE BINAURAL: HCPCS | Performed by: AUDIOLOGIST

## 2020-11-05 PROCEDURE — V5020 CONFORMITY EVALUATION: HCPCS | Mod: RT | Performed by: AUDIOLOGIST

## 2020-11-05 PROCEDURE — V5261 HEARING AID, DIGIT, BIN, BTE: HCPCS | Performed by: AUDIOLOGIST

## 2020-11-05 PROCEDURE — V5011 HEARING AID FITTING/CHECKING: HCPCS | Mod: RT | Performed by: AUDIOLOGIST

## 2020-11-05 NOTE — PROGRESS NOTES
AUDIOLOGY REPORT    SUBJECTIVE: Parker Hunt, a 55 year old male, was seen in the Audiology Clinic at [facility] today for a Binaural hearing aid fitting. Previous results have revealed a bilateral sensorineural hearing loss. The patient has previously worn [employer] hearing aids.     OBJECTIVE:  Prior to fitting, a hearing aid check was performed to ensure device functionality. The hearing aid conformity evaluation was completed.The hearing aids were placed and they provided a good fit. Real-ear-probe-microphone measurements were completed on the [employer] system and were a good match to NAL-NL2 target with soft sounds audible, moderate sounds comfortable, and loud sounds below discomfort. UCLs are verified through maximum power output measures and demonstrate appropriate limiting of loud inputs. Mr. Hunt was oriented to proper hearing aid use, care, cleaning (no water, dry brush), batteries (rechargeable; how to charge, low-battery signal), aid insertion/removal, user booklet, warranty information, storage cases, and other hearing aid details. The patient confirmed understanding of hearing aid use and care, and showed proper insertion of hearing aid and batteries while in the office today. Mr. Hunt reported good volume and sound quality today.    The hearing aids were paired to his Android phone and we verified functionality of streaming for phone calls and other sound sources.    EAR(S) FIT: Binaural  MA HEARING AID MAKE: Right: Phonak; Left: Phonak  MA HEARING AID MODEL #: Right: Audeo P90-RT; Left: Audeo P90-RT  HEARING AID STYLE: Right: RO; Left: RO  DOME SIZE: Right:  large open; Left::  large open   LENGTH: Right:  2S; Left:  2S  SERIAL NUMBERS: Right: [id number]; Left: [id number]  WARRANTY END DATE: Right: 1/28/2024; Left:: 1/28/2024      CHARGES:   Hearing Aid Check: Binaural, 55444, $81.00  Dispensing Fee: Binaural, , $500.00  Fit/Orientation: Binaural, , $370.00  Hearing  Aid Conformity Evaluation: 2, , $174.00  Hearing Aid Digital: Binaural, BTE, , $5475.00  Total: $6600.00       ASSESSMENT: Binaural hearing aid fitting completed today. Verification measures were performed. The 45 day trial period was explained to patient, and they expressed understanding. Mr. Hunt signed the Hearing Aid Purchase Agreement and was given a copy, as well as details on his hearing aids. Patient was counseled that exact out of pocket amounts cannot be determined for hearing aid claims being sent to insurance. Any insurance coverage information presented to the patient is an estimate only, and is not a guarantee of payment. Patient has been advised to check with their own insurance.    PLAN: Mr. Hunt will return for follow-up in early December for a hearing aid review appointment. Please call this clinic with questions regarding today s appointment.    Heath Ramesh, CCC-A  [place] Licensed Audiologist #[id number]  11/5/2020

## 2020-12-09 PROBLEM — M25.512 CHRONIC LEFT SHOULDER PAIN: Status: RESOLVED | Noted: 2020-10-13 | Resolved: 2020-12-09

## 2020-12-09 PROBLEM — G89.29 CHRONIC LEFT SHOULDER PAIN: Status: RESOLVED | Noted: 2020-10-13 | Resolved: 2020-12-09

## 2020-12-09 NOTE — PROGRESS NOTES
Subjective:  HPI  Physical Exam                    Objective:  System    Physical Exam    General     ROS    Assessment/Plan:    DISCHARGE REPORT    Progress reporting period is from 10-13-20 to 10-20-20.       SUBJECTIVE  Subjective changes noted by patient:    Per SOAP note 10-20-20: Parker reports left shoulder better. Feels tight but not blocked.     Current Pain level: 1/10.     Initial Pain level: 6/10.   Changes in function:  Yes (See Goal flowsheet attached for changes in current functional level)  Adverse reaction to treatment or activity: None    OBJECTIVE  Changes noted in objective findings:  Patient has failed to return to therapy so current objective findings are unknown.  Per SOAP note 10-20-20: Decreased thoracic mobility. Forward head posture and rounded shoulders.     ASSESSMENT/PLAN  Updated problem list and treatment plan: Diagnosis 1:  Left Shoulder Pain    Pain -  self management, education and home program  Decreased ROM/flexibility - manual therapy, therapeutic exercise and home program  Decreased strength - therapeutic exercise, therapeutic activities and home program  Inflammation - self management/home program  Impaired muscle performance - neuro re-education and home program  Decreased function - therapeutic activities and home program  STG/LTGs have been met or progress has been made towards goals:  Yes (See Goal flow sheet completed today.)  Assessment of Progress: The patient's condition is improving.  Patient has not returned to therapy.  Current status is unknown and discharge G code cannot be reported.  Self Management Plans:  Patient is independent in a home treatment program.  Patient is independent in self management of symptoms.  I have re-evaluated this patient and find that the nature, scope, duration and intensity of the therapy is appropriate for the medical condition of the patient.  Parker continues to require the following intervention to meet STG and LTG's:  PT  intervention is no longer required to meet STG/LTG.    Recommendations:  This patient is ready to be discharged from therapy and continue their home treatment program.    Please refer to the daily flowsheet for treatment today, total treatment time and time spent performing 1:1 timed codes.

## 2021-01-04 DIAGNOSIS — J45.41 ASTHMA IN ADULT, MODERATE PERSISTENT, WITH ACUTE EXACERBATION: ICD-10-CM

## 2021-01-05 RX ORDER — ALBUTEROL SULFATE 90 UG/1
AEROSOL, METERED RESPIRATORY (INHALATION)
Qty: 18 G | Refills: 3 | Status: SHIPPED | OUTPATIENT
Start: 2021-01-05 | End: 2022-04-29

## 2021-01-05 NOTE — TELEPHONE ENCOUNTER
Prescription approved per [facility] Refill Protocol.  Ambreen Yee RN  January 5, 2021

## 2021-02-21 NOTE — TELEPHONE ENCOUNTER
Reason for call:  Symptom  Reason for call:  Patient reporting a symptom    Symptom or request: sinus inf    Duration (how long have symptoms been present): 3 weeks    Have you been treated for this before? No    Additional comments: congestion, sinus pressure    Phone Number patient can be reached at:  Home number on file [phone]    Best Time:  any    Can we leave a detailed message on this number:  YES    Call taken on 1/30/2017 at 7:12 AM by Katia Cooper     Simple: Patient demonstrates quick and easy understanding

## 2021-03-23 DIAGNOSIS — I10 HTN, GOAL BELOW 140/90: ICD-10-CM

## 2021-03-23 RX ORDER — LISINOPRIL 40 MG/1
TABLET ORAL
Qty: 90 TABLET | Refills: 1 | Status: SHIPPED | OUTPATIENT
Start: 2021-03-23 | End: 2021-10-08

## 2021-04-14 NOTE — TELEPHONE ENCOUNTER
Contacted pt and let him know of below. Will schedule through [employer].  Katia Oliveira CMA      Date of Service: 04/14/2021    SURGEON:  Cholo Vallecillo MD.    ASSISTANT:  Ciara Vargas PA-C.  A certified physician assistant with extensive orthopedic experience was used as a surgical assistant in this case.  Assistant was utilized in this case for tasks including, but not limited to, positioning the patient, placing and holding surgical retractors, assisting in dissection, assisting with implantation of surgical devices, harvesting of tissue grafts, and wound closure under my supervision.  The assistant's role was critical in the completion of the surgical procedure in the safest, most efficient manner.  This assist was beyond the capability of a standard surgical technician's training and capability.  The surgical technicians were utilized to pass instruments to the surgeon, room set up and break down, and retraction of non vital structures.      DIAGNOSIS:  Left de Quervain's tenosynovitis in the left first extensor compartment, retinacular cyst.    PROCEDURE PERFORMED:  Left first extensor compartment release and retinacular cyst excision.    INDICATION:  Decrease pain and improve function.    ANESTHESIA:  MAC, local anesthetic.    DESCRIPTION OF PROCEDURE:  In the  preoperative holding area, the left wrist was site marked by myself.  We obtained informed consent, discussing inherent risks and benefits of the procedure in full detail.  The patient was in full understanding and agreed to proceed.  The patient was brought back to the operative theater and placed in supine position.  MAC anesthetic was administered.  Appropriate timeout was made.  Left upper extremity was prepped and draped in usual sterile fashion and 4 mL of 50/50 mixture of 0.25% Marcaine with 1% Lidocaine was injected along the planned incisional line and a tourniquet was elevated to 250 mmHg with the assistance of an Esmarch bandage for exsanguination.  Following this, a 2.5 cm curvilinear incision was made over the first extensor  compartment.  Superficial branch of the radial nerve was carefully protected.  We came down to the level of the first extensor compartment.  There was an obvious retinacular cyst, which was carefully debrided and removed, and sent to pathology.  Following this, the superior margin of the first extensor compartment was released and there was evidence of a separate EPB sheath, which was released.  There was mild tenosynovitis, which was debrided with a tenotomy scissors.  At this point, all tendons were fully released.  There was no evidence of first extensor compartment tendinous subluxation with passive wrist flexion and extension.      At this point, the tourniquet was released.  Hemostasis was achieved.  The wound was irrigated out and the skin was closed with 4-0 Prolene suture.  Xeroform and soft dressing and well-padded thumb spica splint was applied.  There were no immediate complications.      Dictated By: Cholo Vallecillo MD  Signing Provider: MD CINDY Crane/jeanette ([id number])  DD: 04/14/2021 07:57:47 TD: 04/14/2021 08:12:05    Copy Sent To:

## 2021-05-06 NOTE — PHARMACY-ANTICOAGULATION SERVICE
Clinical Pharmacy - Warfarin Dosing Consult     Pharmacy has been consulted to manage this patient s warfarin therapy.  Indication: DVT/ PE Treatment  Therapy Goal: INR 2-3  Warfarin Prior to Admission: No  Interacting medications: Augmentin  Recent documented change in oral intake/nutrition: Unknown    INR   Date Value Ref Range Status   02/07/2017 3.04* 0.86 - 1.14 Final   02/06/2017 2.58* 0.86 - 1.14 Final       Recommend warfarin 4 mg today.  Pharmacy will monitor Parker Hunt daily and order warfarin doses to achieve specified goal.      Please contact pharmacy as soon as possible if the warfarin needs to be held for a procedure or if the warfarin goals change.        Sofia Powers, PharmD Student      DIFFICULTY BREATHING/SHORTNESS OF BREATH

## 2021-05-10 ENCOUNTER — TELEPHONE (OUTPATIENT)
Dept: AUDIOLOGY | Facility: OTHER | Age: 56
End: 2021-05-10

## 2021-05-10 NOTE — TELEPHONE ENCOUNTER
Called patient back and let him know the domes are ready to  at the [facility] . Dispensed 6 large open Phonak domes.    Heath Ramesh, CCC-A  [place] Licensed Audiologist #[id number]  5/10/2021

## 2021-10-02 DIAGNOSIS — I10 HTN, GOAL BELOW 140/90: ICD-10-CM

## 2021-10-05 NOTE — ASSESSMENT & PLAN NOTE
Stable.  no new symptoms. off anticoagulants.   Patient/Caregiver provided printed discharge information.

## 2021-10-05 NOTE — TELEPHONE ENCOUNTER
Pending Prescriptions:                       Disp   Refills    lisinopril (ZESTRIL) 40 MG tablet [Pharmac*90 tab*1        Sig: TAKE 1 TABLET BY MOUTH DAILY      Routing refill request to provider for review/approval because:  Avelina given x1 and patient did not follow up, please advise    Amparo Briones RN on 10/5/2021 at 1:41 PM

## 2021-10-06 NOTE — TELEPHONE ENCOUNTER
Spoke to patient, unable to get in until next week.   Patient will be out of medication on Friday. Please review/advise. No need to call him back if we send refill.     Next 5 appointments (look out 90 days)    Oct 14, 2021  7:40 AM  Miguel Valadez with Monica Odell MD  [facility] [address]  [facility] [address]  [phone]        Tawana Garcia MA

## 2021-10-08 ENCOUNTER — TELEPHONE (OUTPATIENT)
Dept: FAMILY MEDICINE | Facility: OTHER | Age: 56
End: 2021-10-08

## 2021-10-08 DIAGNOSIS — I10 HTN, GOAL BELOW 140/90: ICD-10-CM

## 2021-10-08 RX ORDER — LISINOPRIL 40 MG/1
TABLET ORAL
Qty: 30 TABLET | Refills: 0 | Status: SHIPPED | OUTPATIENT
Start: 2021-10-08 | End: 2021-10-08

## 2021-10-08 RX ORDER — LISINOPRIL 40 MG/1
40 TABLET ORAL DAILY
Qty: 90 TABLET | Refills: 0 | Status: SHIPPED | OUTPATIENT
Start: 2021-10-08 | End: 2021-10-14

## 2021-10-12 NOTE — PROGRESS NOTES
"Assessment & Plan   Problem List Items Addressed This Visit     Thyroid nodules     History of papillary thyroid cancer. Will update a thyroid ultrasound to evaluate for any new nodules         Relevant Orders    TSH WITH FREE T4 REFLEX    US Thyroid    Hypertension     Well controlled blood pressures on the current dose of lisinopril. Recheck chemistries  Refill meds  Recheck in 1 yr         Relevant Medications    lisinopril (ZESTRIL) 40 MG tablet    Other Relevant Orders    BASIC METABOLIC PANEL    Lipid panel reflex to direct LDL Fasting    Moderate persistent asthma with acute exacerbation     Stable. ACT -24.continue qvar and albuterol.         Relevant Medications    beclomethasone HFA (QVAR REDIHALER) 80 MCG/ACT inhaler    fluticasone (FLONASE) 50 MCG/ACT nasal spray    Allergic rhinitis, unspecified seasonality, unspecified trigger     New onset allergies  Triggers unknown.  Discussed a trial of flonase and continue allegra           Relevant Medications    beclomethasone HFA (QVAR REDIHALER) 80 MCG/ACT inhaler    fluticasone (FLONASE) 50 MCG/ACT nasal spray      Other Visit Diagnoses     Need for vaccination    -  Primary    Relevant Orders    SHINGRIX [[id number]] (Completed)    Screening for HIV (human immunodeficiency virus)        Relevant Orders    HIV Antigen Antibody Combo    Severe persistent asthma without complication        Relevant Medications    beclomethasone HFA (QVAR REDIHALER) 80 MCG/ACT inhaler    fluticasone (FLONASE) 50 MCG/ACT nasal spray    HTN, goal below 140/90        Relevant Medications    lisinopril (ZESTRIL) 40 MG tablet           BMI:   Estimated body mass index is 32.67 kg/m  as calculated from the following:    Height as of this encounter: 1.9 m (6' 2.8\").    Weight as of this encounter: 117.9 kg (260 lb).   Weight management plan: Discussed healthy diet and exercise guidelines    See Patient Instructions    No follow-ups on file.    Monica Odell MD  [facility] " [place] is a 56 year old who presents for the following health issues     Discuss aspirin - there is new updates with taking it for people under the age of 60.    History of Present Illness     Asthma:  He presents for follow up of asthma.  He has no cough, no wheezing, and no shortness of breath. He is using a relief medication a few times a month. He does not miss any doses of his controller medication throughout the week.Patient is aware of the following triggers: none. The patient has not had a visit to the Emergency Room, Urgent Care or Hospital due to asthma since the last clinic visit. He has been to the Emergency Room or Urgent Care 0 times.He has had a Hospitalization 0 times.    Hypertension: He presents for follow up of hypertension.  He does not check blood pressure  regularly outside of the clinic. : Not Applicable. He does not follow a low salt diet.     He eats 4 or more servings of fruits and vegetables daily.He consumes 0 sweetened beverage(s) daily.He exercises with enough effort to increase his heart rate 60 or more minutes per day.  He exercises with enough effort to increase his heart rate 5 days per week.   He is taking medications regularly.     Allergies  Onset/Duration: a couple weeks ago.   Symptoms:   Nasal congestion: YES- constant running.   Sneezing: not much but sometimes. A couple times a day.   Red, itchy eyes: no  Progression of Symptoms: same intermittent  Accompanying Signs & Symptoms:  Cough: no  Wheezing: no  Rash: no  Sinus/facial pain: no - only when taking the loratadine.   History:   Is it seasonal: in the fall - this is the first time ever he has had trouble with allergies. History of Asthma: YES  Has allergy testing been done: no  Precipitating factors:   Taking loratadine, weather getting colder.   Alleviating factors:  Cetirizine  Therapies tried and outcome: loratadine - head filled up so he stopped, started taking cetirizine and then it helped.  "        Review of Systems   Constitutional, HEENT, cardiovascular, pulmonary, GI, , musculoskeletal, neuro, skin, endocrine and psych systems are negative, except as otherwise noted.      Objective    /84   Pulse 82   Temp (!) 96  F (35.6  C) (Temporal)   Resp 16   Ht 1.9 m (6' 2.8\")   Wt 117.9 kg (260 lb)   SpO2 96%   BMI 32.67 kg/m    Body mass index is 32.67 kg/m .  Physical Exam   GENERAL: healthy, alert and no distress  EYES: Eyes grossly normal to inspection, PERRL and conjunctivae and sclerae normal  HENT: ear canals and TM's normal, nose and mouth without ulcers or lesions  NECK: no adenopathy, no asymmetry, masses, or scars and thyroid normal to palpation  RESP: lungs clear to auscultation - no rales, rhonchi or wheezes  CV: regular rate and rhythm, normal S1 S2, no S3 or S4, no murmur, click or rub, no peripheral edema and peripheral pulses strong  ABDOMEN: soft, nontender, no hepatosplenomegaly, no masses and bowel sounds normal  MS: no gross musculoskeletal defects noted, no edema      "

## 2021-10-14 ENCOUNTER — OFFICE VISIT (OUTPATIENT)
Dept: FAMILY MEDICINE | Facility: OTHER | Age: 56
End: 2021-10-14
Payer: COMMERCIAL

## 2021-10-14 VITALS
TEMPERATURE: 96 F | SYSTOLIC BLOOD PRESSURE: 130 MMHG | HEART RATE: 82 BPM | HEIGHT: 75 IN | RESPIRATION RATE: 16 BRPM | DIASTOLIC BLOOD PRESSURE: 84 MMHG | BODY MASS INDEX: 32.33 KG/M2 | OXYGEN SATURATION: 96 % | WEIGHT: 260 LBS

## 2021-10-14 DIAGNOSIS — Z11.4 SCREENING FOR HIV (HUMAN IMMUNODEFICIENCY VIRUS): ICD-10-CM

## 2021-10-14 DIAGNOSIS — I10 HTN, GOAL BELOW 140/90: ICD-10-CM

## 2021-10-14 DIAGNOSIS — J45.50 SEVERE PERSISTENT ASTHMA WITHOUT COMPLICATION (H): ICD-10-CM

## 2021-10-14 DIAGNOSIS — E04.1 THYROID NODULE: ICD-10-CM

## 2021-10-14 DIAGNOSIS — J45.41 MODERATE PERSISTENT ASTHMA WITH ACUTE EXACERBATION: ICD-10-CM

## 2021-10-14 DIAGNOSIS — J30.9 ALLERGIC RHINITIS, UNSPECIFIED SEASONALITY, UNSPECIFIED TRIGGER: ICD-10-CM

## 2021-10-14 DIAGNOSIS — I10 HYPERTENSION, UNSPECIFIED TYPE: ICD-10-CM

## 2021-10-14 DIAGNOSIS — Z23 NEED FOR VACCINATION: Primary | ICD-10-CM

## 2021-10-14 LAB
ANION GAP SERPL CALCULATED.3IONS-SCNC: 1 MMOL/L (ref 3–14)
BUN SERPL-MCNC: 16 MG/DL (ref 7–30)
CALCIUM SERPL-MCNC: 8.9 MG/DL (ref 8.5–10.1)
CHLORIDE BLD-SCNC: 110 MMOL/L (ref 94–109)
CHOLEST SERPL-MCNC: 133 MG/DL
CO2 SERPL-SCNC: 30 MMOL/L (ref 20–32)
CREAT SERPL-MCNC: 0.89 MG/DL (ref 0.66–1.25)
FASTING STATUS PATIENT QL REPORTED: YES
GFR SERPL CREATININE-BSD FRML MDRD: >90 ML/MIN/1.73M2
GLUCOSE BLD-MCNC: 96 MG/DL (ref 70–99)
HDLC SERPL-MCNC: 45 MG/DL
HIV 1+2 AB+HIV1 P24 AG SERPL QL IA: NONREACTIVE
LDLC SERPL CALC-MCNC: 77 MG/DL
NONHDLC SERPL-MCNC: 88 MG/DL
POTASSIUM BLD-SCNC: 5.2 MMOL/L (ref 3.4–5.3)
SODIUM SERPL-SCNC: 141 MMOL/L (ref 133–144)
TRIGL SERPL-MCNC: 55 MG/DL
TSH SERPL DL<=0.005 MIU/L-ACNC: 2.3 MU/L (ref 0.4–4)

## 2021-10-14 PROCEDURE — 87389 HIV-1 AG W/HIV-1&-2 AB AG IA: CPT | Performed by: FAMILY MEDICINE

## 2021-10-14 PROCEDURE — 99214 OFFICE O/P EST MOD 30 MIN: CPT | Mod: 25 | Performed by: FAMILY MEDICINE

## 2021-10-14 PROCEDURE — 80061 LIPID PANEL: CPT | Performed by: FAMILY MEDICINE

## 2021-10-14 PROCEDURE — 36415 COLL VENOUS BLD VENIPUNCTURE: CPT | Performed by: FAMILY MEDICINE

## 2021-10-14 PROCEDURE — 90750 HZV VACC RECOMBINANT IM: CPT | Performed by: FAMILY MEDICINE

## 2021-10-14 PROCEDURE — 80048 BASIC METABOLIC PNL TOTAL CA: CPT | Performed by: FAMILY MEDICINE

## 2021-10-14 PROCEDURE — 90471 IMMUNIZATION ADMIN: CPT | Performed by: FAMILY MEDICINE

## 2021-10-14 PROCEDURE — 84443 ASSAY THYROID STIM HORMONE: CPT | Performed by: FAMILY MEDICINE

## 2021-10-14 RX ORDER — LISINOPRIL 40 MG/1
40 TABLET ORAL DAILY
Qty: 90 TABLET | Refills: 2 | Status: SHIPPED | OUTPATIENT
Start: 2021-10-14 | End: 2022-10-12

## 2021-10-14 RX ORDER — BECLOMETHASONE DIPROPIONATE HFA 80 UG/1
AEROSOL, METERED RESPIRATORY (INHALATION)
Qty: 31.8 G | Refills: 5 | Status: SHIPPED | OUTPATIENT
Start: 2021-10-14

## 2021-10-14 RX ORDER — FLUTICASONE PROPIONATE 50 MCG
1 SPRAY, SUSPENSION (ML) NASAL DAILY
Qty: 16 G | Refills: 0 | Status: SHIPPED | OUTPATIENT
Start: 2021-10-14

## 2021-10-14 ASSESSMENT — ASTHMA QUESTIONNAIRES
QUESTION_3 LAST FOUR WEEKS HOW OFTEN DID YOUR ASTHMA SYMPTOMS (WHEEZING, COUGHING, SHORTNESS OF BREATH, CHEST TIGHTNESS OR PAIN) WAKE YOU UP AT NIGHT OR EARLIER THAN USUAL IN THE MORNING: NOT AT ALL
ACT_TOTALSCORE: 24
QUESTION_2 LAST FOUR WEEKS HOW OFTEN HAVE YOU HAD SHORTNESS OF BREATH: NOT AT ALL
QUESTION_1 LAST FOUR WEEKS HOW MUCH OF THE TIME DID YOUR ASTHMA KEEP YOU FROM GETTING AS MUCH DONE AT WORK, SCHOOL OR AT HOME: NONE OF THE TIME
QUESTION_5 LAST FOUR WEEKS HOW WOULD YOU RATE YOUR ASTHMA CONTROL: COMPLETELY CONTROLLED
QUESTION_4 LAST FOUR WEEKS HOW OFTEN HAVE YOU USED YOUR RESCUE INHALER OR NEBULIZER MEDICATION (SUCH AS ALBUTEROL): ONCE A WEEK OR LESS

## 2021-10-14 ASSESSMENT — MIFFLIN-ST. JEOR: SCORE: 2081.85

## 2021-10-14 ASSESSMENT — PAIN SCALES - GENERAL: PAINLEVEL: NO PAIN (0)

## 2021-10-14 NOTE — LETTER
My Asthma Action Plan    Name: Parker Hunt   YOB: 1965  Date: 10/14/2021   My doctor: Monica Odell MD   My clinic: [facility]        My Control Medicine: Beclomethasone dipropionate (Qvar Redihaler) -  80 mcg .  My Rescue Medicine: Albuterol (Proair/Ventolin/Proventil HFA) 2-4 puffs EVERY 4 HOURS as needed. Use a spacer if recommended by your provider.  My Oral Steroid Medicine: . My Asthma Severity:   Mild Persistent  Know your asthma triggers: Patient is unaware of triggers  upper respiratory infections  animal dander  exercise or sports  cold air            GREEN ZONE   Good Control    I feel good    No cough or wheeze    Can work, sleep and play without asthma symptoms       Take your asthma control medicine every day.     1. If exercise triggers your asthma, take your rescue medication    15 minutes before exercise or sports, and    During exercise if you have asthma symptoms  2. Spacer to use with inhaler: If you have a spacer, make sure to use it with your inhaler             YELLOW ZONE Getting Worse  I have ANY of these:    I do not feel good    Cough or wheeze    Chest feels tight    Wake up at night   1. Keep taking your Green Zone medications  2. Start taking your rescue medicine:    every 20 minutes for up to 1 hour. Then every 4 hours for 24-48 hours.  3. If you stay in the Yellow Zone for more than 12-24 hours, contact your doctor.  4. If you do not return to the Green Zone in 12-24 hours or you get worse, start taking your oral steroid medicine if prescribed by your provider.           RED ZONE Medical Alert - Get Help  I have ANY of these:    I feel awful    Medicine is not helping    Breathing getting harder    Trouble walking or talking    Nose opens wide to breathe       1. Take your rescue medicine NOW  2. If your provider has prescribed an oral steroid medicine, start taking it NOW  3. Call your doctor NOW  4. If you are still in the Red Zone  after 20 minutes and you have not reached your doctor:    Take your rescue medicine again and    Call 911 or go to the emergency room right away    See your regular doctor within 2 weeks of an Emergency Room or Urgent Care visit for follow-up treatment.          Annual Reminders:  Meet with Asthma Educator,  Flu Shot in the Fall, consider Pneumonia Vaccination for patients with asthma (aged 19 and older).    Pharmacy:    [facility] - [place], [place] - [address] [facility] [place] - [address]  [id number] - [place] - [address] [address] [place] [address]    Electronically signed by Monica Odell MD   Date: 10/14/21                      Asthma Triggers  How To Control Things That Make Your Asthma Worse    Triggers are things that make your asthma worse.  Look at the list below to help you find your triggers and what you can do about them.  You can help prevent asthma flare-ups by staying away from your triggers.      Trigger                                                          What you can do   Cigarette Smoke  Tobacco smoke can make asthma worse. Do not allow smoking in your home, car or around you.  Be sure no one smokes at a child s day care or school.  If you smoke, ask your health care provider for ways to help you quit.  Ask family members to quit too.  Ask your health care provider for a referral to Quit Plan to help you quit smoking, or call 1-814-639-PLAN.     Colds, Flu, Bronchitis  These are common triggers of asthma. Wash your hands often.  Don t touch your eyes, nose or mouth.  Get a flu shot every year.     Dust Mites  These are tiny bugs that live in cloth or carpet. They are too small to see. Wash sheets and blankets in hot water every week.   Encase pillows and mattress in dust mite proof covers.  Avoid having carpet if you can. If you have carpet, vacuum weekly.   Use a dust mask and HEPA vacuum.   Pollen and Outdoor Mold  Some people are  allergic to trees, grass, or weed pollen, or molds. Try to keep your windows closed.  Limit time out doors when pollen count is high.   Ask you health care provider about taking medicine during allergy season.     Animal Dander  Some people are allergic to skin flakes, urine or saliva from pets with fur or feathers. Keep pets with fur or feathers out of your home.    If you can t keep the pet outdoors, then keep the pet out of your bedroom.  Keep the bedroom door closed.  Keep pets off cloth furniture and away from stuffed toys.     Mice, Rats, and Cockroaches   Some people are allergic to the waste from these pests.   Cover food and garbage.  Clean up spills and food crumbs.  Store grease in the refrigerator.   Keep food out of the bedroom.   Indoor Mold  This can be a trigger if your home has high moisture. Fix leaking faucets, pipes, or other sources of water.   Clean moldy surfaces.  Dehumidify basement if it is damp and smelly.   Smoke, Strong Odors, and Sprays  These can reduce air quality. Stay away from strong odors and sprays, such as perfume, powder, hair spray, paints, smoke incense, paint, cleaning products, candles and new carpet.   Exercise or Sports  Some people with asthma have this trigger. Be active!  Ask your doctor about taking medicine before sports or exercise to prevent symptoms.    Warm up for 5-10 minutes before and after sports or exercise.     Other Triggers of Asthma  Cold air:  Cover your nose and mouth with a scarf.  Sometimes laughing or crying can be a trigger.  Some medicines and food can trigger asthma.

## 2021-10-14 NOTE — ASSESSMENT & PLAN NOTE
History of papillary thyroid cancer. Will update a thyroid ultrasound to evaluate for any new nodules

## 2021-10-14 NOTE — NURSING NOTE
Prior to immunization administration, verified patients identity using patient s name and date of birth. Please see Immunization Activity for additional information.     Screening Questionnaire for Adult Immunization    Are you sick today?   No   Do you have allergies to medications, food, a vaccine component or latex?   No   Have you ever had a serious reaction after receiving a vaccination?   No   Do you have a long-term health problem with heart, lung, kidney, or metabolic disease (e.g., diabetes), asthma, a blood disorder, no spleen, complement component deficiency, a cochlear implant, or a spinal fluid leak?  Are you on long-term aspirin therapy?   No   Do you have cancer, leukemia, HIV/AIDS, or any other immune system problem?   No   Do you have a parent, brother, or sister with an immune system problem?   No   In the past 3 months, have you taken medications that affect  your immune system, such as prednisone, other steroids, or anticancer drugs; drugs for the treatment of rheumatoid arthritis, Crohn s disease, or psoriasis; or have you had radiation treatments?   No   Have you had a seizure, or a brain or other nervous system problem?   No   During the past year, have you received a transfusion of blood or blood    products, or been given immune (gamma) globulin or antiviral drug?   No   For women: Are you pregnant or is there a chance you could become       pregnant during the next month?   No   Have you received any vaccinations in the past 4 weeks?   No     Immunization questionnaire answers were all negative.        Per orders of Dr. Odell, injection of Shinrix given by Tiffanie Burgos MA. Patient instructed to remain in clinic for 15 minutes afterwards, and to report any adverse reaction to me immediately.       Screening performed by Tiffanie Burgos MA on 10/14/2021 at 8:34 AM.

## 2021-10-14 NOTE — RESULT ENCOUNTER NOTE
Mr. Hunt    Your recent test results are attached.  Negative HIV test.    If you have any questions or concerns please contact me via My Chart or call the clinic at [phone]     Thank You  Monica Odell MD.

## 2021-10-14 NOTE — RESULT ENCOUNTER NOTE
Mr. Hunt    Your recent test results are attached.  Stable labs.    If you have any questions or concerns please contact me via My Chart or call the clinic at [phone]     Thank You  Monica Odell MD.

## 2021-10-14 NOTE — ASSESSMENT & PLAN NOTE
Well controlled blood pressures on the current dose of lisinopril. Recheck chemistries  Refill meds  Recheck in 1 yr

## 2021-10-15 ASSESSMENT — ASTHMA QUESTIONNAIRES: ACT_TOTALSCORE: 24

## 2021-10-16 ENCOUNTER — MYC MEDICAL ADVICE (OUTPATIENT)
Dept: FAMILY MEDICINE | Facility: OTHER | Age: 56
End: 2021-10-16

## 2021-10-20 ENCOUNTER — HOSPITAL ENCOUNTER (OUTPATIENT)
Dept: ULTRASOUND IMAGING | Facility: CLINIC | Age: 56
Discharge: HOME | End: 2021-10-20
Attending: FAMILY MEDICINE | Admitting: FAMILY MEDICINE
Payer: COMMERCIAL

## 2021-10-20 DIAGNOSIS — E04.1 THYROID NODULE: ICD-10-CM

## 2021-10-20 PROCEDURE — 76536 US EXAM OF HEAD AND NECK: CPT

## 2021-10-21 NOTE — RESULT ENCOUNTER NOTE
Mr. Hunt    Your recent test results are attached.  Ultrasound of the thyroid showed nodule in the right lobe which does not need criteria for further follow-up.  Recommend watchful observation for now.  No acute intervention necessary at this time    If you have any questions or concerns please contact me via My Chart or call the clinic at [phone]     Thank You  Monica Odell MD.

## 2021-10-23 ENCOUNTER — HEALTH MAINTENANCE LETTER (OUTPATIENT)
Age: 56
End: 2021-10-23

## 2021-11-05 NOTE — PATIENT INSTRUCTIONS

## 2022-01-13 ENCOUNTER — TELEPHONE (OUTPATIENT)
Dept: AUDIOLOGY | Facility: CLINIC | Age: 57
End: 2022-01-13
Payer: COMMERCIAL

## 2022-01-13 NOTE — TELEPHONE ENCOUNTER
Called patient back. His hearing aid has not been working well. Discussed changing wax guards, which he will try. I encouraged him to drop off the hearing aid if that does not fix the problem. He has also been having intermittent problems with the , so he will keep an eye on that and let me know if he wants me to look at the  as well.    Heath Ramesh, CCC-A  [place] Licensed Audiologist #[id number]  1/13/2022

## 2022-01-15 ENCOUNTER — E-VISIT (OUTPATIENT)
Dept: URGENT CARE | Facility: CLINIC | Age: 57
End: 2022-01-15
Payer: COMMERCIAL

## 2022-01-15 DIAGNOSIS — J01.90 ACUTE BACTERIAL SINUSITIS: Primary | ICD-10-CM

## 2022-01-15 DIAGNOSIS — B96.89 ACUTE BACTERIAL SINUSITIS: Primary | ICD-10-CM

## 2022-01-15 PROCEDURE — 99421 OL DIG E/M SVC 5-10 MIN: CPT | Performed by: NURSE PRACTITIONER

## 2022-01-15 NOTE — PATIENT INSTRUCTIONS
You may want to try a nasal lavage (also known as nasal irrigation). You can find over-the-counter products, such as Neti-Pot, at retail locations or make your own at home. Instructions for homemade nasal lavage and more information on the process are available online at http://www.aafp.org/afp/2009/1115/p1121.html.    Dear Parker Hunt    After reviewing your responses, I've been able to diagnose you with?a sinus infection caused by bacteria.?     Based on your responses and diagnosis, I have prescribed Augmentin to treat your symptoms. I have sent this to your pharmacy.?     It is also important to stay well hydrated, get lots of rest and take over-the-counter decongestants,?tylenol?or ibuprofen if you?are able to?take those medications per your primary care provider to help relieve discomfort.?     It is important that you take?all of?your prescribed medication even if your symptoms are improving after a few doses.? Taking?all of?your medicine helps prevent the symptoms from returning.?     If your symptoms worsen, you develop severe headache, vomiting, high fever (>102), or are not improving in 7 days, please contact your primary care provider for an appointment or visit any of our convenient Walk-in Care or Urgent Care Centers to be seen which can be found on our website?here.?     Thanks again for choosing?us?as your health care partner,?   ?  Shawnee Nelson CNP?   
[phone]

## 2022-01-19 ENCOUNTER — TELEPHONE (OUTPATIENT)
Dept: AUDIOLOGY | Facility: OTHER | Age: 57
End: 2022-01-19

## 2022-01-19 DIAGNOSIS — H90.3 SENSORINEURAL HEARING LOSS, BILATERAL: Primary | ICD-10-CM

## 2022-01-19 PROCEDURE — V5267 HEARING AID SUP/ACCESS/DEV: HCPCS | Performed by: AUDIOLOGIST

## 2022-01-19 NOTE — TELEPHONE ENCOUNTER
Called patient back and talked to him. He would like two packs of Cerushield wax guards and a few pairs of large open domes. He would like them mailed to the address on file. The supplies were dispensed and mailed to the patient.    [facility]  [id number] Hearing aid supply x2 (wax guards: $5 each, total $10)    Heath Ramesh, CCC-A  [place] Licensed Audiologist #[id number]  1/19/2022

## 2022-01-21 ENCOUNTER — VIRTUAL VISIT (OUTPATIENT)
Dept: INTERNAL MEDICINE | Facility: CLINIC | Age: 57
End: 2022-01-21
Payer: COMMERCIAL

## 2022-01-21 ENCOUNTER — MYC MEDICAL ADVICE (OUTPATIENT)
Dept: FAMILY MEDICINE | Facility: OTHER | Age: 57
End: 2022-01-21
Payer: COMMERCIAL

## 2022-01-21 ENCOUNTER — MYC MEDICAL ADVICE (OUTPATIENT)
Dept: FAMILY MEDICINE | Facility: OTHER | Age: 57
End: 2022-01-21

## 2022-01-21 DIAGNOSIS — J32.0 MAXILLARY SINUSITIS, UNSPECIFIED CHRONICITY: Primary | ICD-10-CM

## 2022-01-21 PROCEDURE — 99213 OFFICE O/P EST LOW 20 MIN: CPT | Mod: TEL | Performed by: INTERNAL MEDICINE

## 2022-01-21 NOTE — TELEPHONE ENCOUNTER
It is not recommended to do antibiotics beyond the first round. The fact that he still has stuffy nose proves that his symptoms are either allergic or viral in origin. Advise visit  If symptoms are persistent

## 2022-01-21 NOTE — TELEPHONE ENCOUNTER
Do you want to see pt again?    Joan Espinoza, BSN, RN, PHN  Registered Nurse-Clinic Triage  [facility]  1/21/2022 at 9:04 AM

## 2022-01-21 NOTE — PATIENT INSTRUCTIONS
No need for additional antibiotics at this time  Either retry Fluticasone/Flonase or get  OTC Nasacort  (both steroid nasal sprays but Nasacort doesn't have lilac scent) and use 2 spray each nostril daily at bedtime to reduce nasal congestion. Use daily for least the next 10 days  Saline nasal rinse daily in the morning for the next couple days to wash out the sinuses  If sinus issues persist in another 2 weeks despite the consistent use of a steroid nasal spray, then contact your primary provider to consider CT scan of the sinuses

## 2022-03-09 ENCOUNTER — TELEPHONE (OUTPATIENT)
Dept: AUDIOLOGY | Facility: OTHER | Age: 57
End: 2022-03-09
Payer: COMMERCIAL

## 2022-03-09 ENCOUNTER — OFFICE VISIT (OUTPATIENT)
Dept: AUDIOLOGY | Facility: OTHER | Age: 57
End: 2022-03-09
Payer: COMMERCIAL

## 2022-03-09 DIAGNOSIS — H90.3 SENSORINEURAL HEARING LOSS, BILATERAL: Primary | ICD-10-CM

## 2022-03-09 PROCEDURE — 99207 PR NO CHARGE LOS: CPT | Performed by: AUDIOLOGIST

## 2022-03-09 PROCEDURE — V5299 HEARING SERVICE: HCPCS | Performed by: AUDIOLOGIST

## 2022-03-09 NOTE — TELEPHONE ENCOUNTER
Called patient back and let him know that I am at the [facility] today so he can drop off his left hearing aid. While we were on the phone he tried the right hearing aid in the left side of the  and it charged, so it seems to be a problem with the hearing aid rather than the . I encouraged him to drop off only the hearing aid and to keep the  to use with the right hearing aid. If the  is still a problem we can usually order a new one without sending the old one in. I will call him when his hearing aid returns from repair.    Heath Ramesh, CCC-A  [place] Licensed Audiologist #[id number]  3/9/2022

## 2022-03-09 NOTE — PROGRESS NOTES
HEARING AID DROP-OFF    Patient Name:  Parker Hunt    Patient Age:   56 year old    :  1965    Background:   The patient dropped off his left hearing aid because it is not working and does not charge.      SIDE: Left   MAKE: Phonak   MODEL: Audeo P90-RT   S/N: [id number]   WARRANTY: 2024    Procedures:   The hearing aid would not turn on, so a listening check was not possible. It was placed in a  and did not charge. The hearing aid will be sent in for repair.    Plan:   The patient will be called when the hearing aid is ready to .      Heath Ramesh, CCC-A  Licensed Audiologist  3/9/2022

## 2022-03-16 ENCOUNTER — TELEPHONE (OUTPATIENT)
Dept: AUDIOLOGY | Facility: OTHER | Age: 57
End: 2022-03-16
Payer: COMMERCIAL

## 2022-03-16 NOTE — TELEPHONE ENCOUNTER
Called patient and let him know that his hearing aid is back from repair and is ready to  at the [facility] .    Heath Ramesh, CCC-A  [place] Licensed Audiologist #[id number]  3/16/2022

## 2022-08-18 NOTE — ASSESSMENT & PLAN NOTE
Well controlled on the current dose of qvar and albuterol   Protopic Pregnancy And Lactation Text: This medication is Pregnancy Category C. It is unknown if this medication is excreted in breast milk when applied topically.

## 2022-10-07 DIAGNOSIS — I10 HTN, GOAL BELOW 140/90: ICD-10-CM

## 2022-10-09 ENCOUNTER — HEALTH MAINTENANCE LETTER (OUTPATIENT)
Age: 57
End: 2022-10-09

## 2022-10-12 RX ORDER — LISINOPRIL 40 MG/1
TABLET ORAL
Qty: 90 TABLET | Refills: 0 | Status: SHIPPED | OUTPATIENT
Start: 2022-10-12

## 2022-10-15 ENCOUNTER — E-VISIT (OUTPATIENT)
Dept: URGENT CARE | Facility: CLINIC | Age: 57
End: 2022-10-15
Payer: COMMERCIAL

## 2022-10-15 DIAGNOSIS — J01.90 ACUTE BACTERIAL SINUSITIS: Primary | ICD-10-CM

## 2022-10-15 DIAGNOSIS — B96.89 ACUTE BACTERIAL SINUSITIS: Primary | ICD-10-CM

## 2022-10-15 PROCEDURE — 99421 OL DIG E/M SVC 5-10 MIN: CPT | Performed by: NURSE PRACTITIONER

## 2022-10-15 NOTE — PATIENT INSTRUCTIONS
Sinusitis (Antibiotic Treatment)    The sinuses are air-filled spaces within the bones of the face. They connect to the inside of the nose. Sinusitis is an inflammation of the tissue that lines the sinuses. Sinusitis can occur during a cold. It can also happen due to allergies to pollens and other particles in the air. Sinusitis can cause symptoms of sinus congestion and a feeling of fullness. A sinus infection causes fever, headache, and facial pain. There is often green or yellow fluid draining from the nose or into the back of the throat (post-nasal drip). You have been given antibiotics to treat this condition.   Home care  Take the full course of antibiotics as instructed. Don't stop taking them, even when you feel better.  Drink plenty of water, hot tea, and other liquids as directed by the healthcare provider. This may help thin nasal mucus. It also may help your sinuses drain fluids.  Heat may help soothe painful areas of your face. Use a towel soaked in hot water. Or,  the shower and direct the warm spray onto your face. Using a vaporizer along with a menthol rub at night may also help soothe symptoms.   An expectorant with guaifenesin may help thin nasal mucus and help your sinuses drain fluids. Talk with your provider or pharmacists before taking an over-the-counter (OTC) medicine if you have any questions about it or its side effects..  You can use an OTC decongestant, unless a similar medicine was prescribed to you. Nasal sprays work the fastest. Use one that contains phenylephrine or oxymetazoline. First blow your nose gently. Then use the spray. Don't use these medicines more often than directed on the label. If you do, your symptoms may get worse. You may also take pills that contain pseudoephedrine. Don t use products that combine multiple medicines. This is because side effects may be increased. Read labels. You can also ask the pharmacist for help. (People with high blood pressure  should not use decongestants. They can raise blood pressure.) Talk with your provider or pharmacist if you have any questions about the medicine..  OTC antihistamines may help if allergies contributed to your sinusitis. Talk with your provider or pharmacist if you have any questions about the medicine..  Don't use nasal rinses or irrigation during an acute sinus infection, unless your healthcare provider tells you to. Rinsing may spread the infection to other areas in your sinuses.  Use acetaminophen or ibuprofen to control pain, unless another pain medicine was prescribed to you. If you have chronic liver or kidney disease or ever had a stomach ulcer, talk with your healthcare provider before using these medicines. Never give aspirin to anyone under age 18 who is ill with a fever. It may cause severe liver damage.  Don't smoke. This can make symptoms worse.    Follow-up care  Follow up with your healthcare provider, or as advised.   When to seek medical advice  Call your healthcare provider if any of these occur:   Facial pain or headache that gets worse  Stiff neck  Unusual drowsiness or confusion  Swelling of your forehead or eyelids  Symptoms don't go away in 10 days  Vision problems, such as blurred or double vision  Fever of 100.4 F (38 C) or higher, or as directed by your healthcare provider  Call 911  Call 911 if any of these occur:   Seizure  Trouble breathing  Feeling dizzy or faint  Fingernails, skin or lips look blue, purple , or gray  Prevention  Here are steps you can take to help prevent an infection:   Keep good hand washing habits.  Don t have close contact with people who have sore throats, colds, or other upper respiratory infections.  Don t smoke, and stay away from secondhand smoke.  Stay up to date with of your vaccines.  [employer] last reviewed this educational content on 12/1/2019 2000-2021 [employer]. All rights reserved. This information is not intended as a substitute for  professional medical care. Always follow your healthcare professional's instructions.        Dear Parker Hunt    After reviewing your responses, I've been able to diagnose you with?a sinus infection caused by bacteria.?     Based on your responses and diagnosis, I have prescribed Augmentin for your symptoms. I have sent this to your pharmacy.?     It is also important to stay well hydrated, get lots of rest and take over-the-counter decongestants,?tylenol?or ibuprofen if you?are able to?take those medications per your primary care provider to help relieve discomfort.?     It is important that you take?all of?your prescribed medication even if your symptoms are improving after a few doses.? Taking?all of?your medicine helps prevent the symptoms from returning.?     If your symptoms worsen, you develop severe headache, vomiting, high fever (>102), or are not improving in 7 days, please contact your primary care provider for an appointment or visit any of our convenient Walk-in Care or Urgent Care Centers to be seen which can be found on our website?here.?     Thanks again for choosing?us?as your health care partner,?   ?  ALEXSANDRA Calvillo CNP?

## 2022-11-26 ENCOUNTER — HEALTH MAINTENANCE LETTER (OUTPATIENT)
Age: 57
End: 2022-11-26

## 2024-01-06 ENCOUNTER — HEALTH MAINTENANCE LETTER (OUTPATIENT)
Age: 59
End: 2024-01-06

## 2024-11-21 NOTE — PROGRESS NOTES
SUBJECTIVE:   Parker Hunt is a 52 year old male who presents to clinic today for the following health issues:      History of Present Illness     Hypertension:     Outpatient blood pressures:  Are not being checked    Dietary sodium intake::  Not monitoring salt intake    -------------------------------------  Problem list and histories reviewed & adjusted, as indicated.  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     Atypical chest pain     Thyroid nodules     Hyperlipidemia LDL goal <160     Hypertension     History of thyroid cancer     History of pulmonary embolism     Moderate persistent asthma with acute exacerbation     Obesity     Herpes zoster without complication     Past Surgical History:   Procedure Laterality Date     COLONOSCOPY  3/21/2014    Procedure: COLONOSCOPY;  colonoscopy;  Surgeon: Sarbjit Sy MD;  Location: [facility]     NO HISTORY OF SURGERY       THYROIDECTOMY  4/29/2011    Procedure:THYROIDECTOMY; Jordan Thyroidectomy, Possible Total Thyroidectomy; Surgeon:JOSHUA CHAPPELL; Location: OR       Social History   Substance Use Topics     Smoking status: Never Smoker     Smokeless tobacco: Never Used     Alcohol use Yes      Comment: 2-3 beers/month     Family History   Problem Relation Age of Onset     Respiratory Brother      asthma     C.A.D. Maternal Grandfather      in 60s had heart issues         Current Outpatient Prescriptions   Medication Sig Dispense Refill     lisinopril (PRINIVIL/ZESTRIL) 40 MG tablet Take 1 tablet (40 mg) by mouth daily 90 tablet 0     albuterol (VENTOLIN HFA) 108 (90 BASE) MCG/ACT Inhaler USE 2 PUFFS EVERY SIX HOURS AS NEEDED FOR SHORTNESS OF BREATH AND DIFFICULTY BREATHING 18 g 3     oseltamivir (TAMIFLU) 75 MG capsule Take 1 capsule (75 mg) by mouth daily 10 capsule 0     valACYclovir (VALTREX) 1000  Call patient.  CBC stable.  White count stable.  Hemoglobin stable 11.9.  Serum sodium level stable 130.  Continue meds the same.  Continue water restriction about 1200 mL/day.  Limit fluid intake to 1200 mL/day.  No more.  Continue other medicines the same including vancomycin taper.  Cough any worsening abdominal pain or diarrhea.  Keep regular scheduled follow-up with me.  Discussed with patient and daughter Judy. "mg tablet Take 1 tablet (1,000 mg) by mouth 3 times daily 21 tablet 0     beclomethasone (QVAR) 80 MCG/ACT Inhaler Inhale 2 puffs into the lungs 2 times daily 3 Inhaler 1     [DISCONTINUED] lisinopril (PRINIVIL/ZESTRIL) 10 MG tablet Take 2 tablets (20 mg) by mouth daily 60 tablet 0     [DISCONTINUED] albuterol (VENTOLIN HFA) 108 (90 BASE) MCG/ACT Inhaler USE 2 PUFFS EVERY SIX HOURS AS NEEDED FOR SHORTNESS OF BREATH AND DIFFICULTY BREATHING 18 g 3     Allergies   Allergen Reactions     No Known Drug Allergies      BP Readings from Last 3 Encounters:   03/08/18 138/84   02/01/18 144/90   08/08/17 130/84    Wt Readings from Last 3 Encounters:   03/08/18 258 lb (117 kg)   02/01/18 258 lb 14.4 oz (117.4 kg)   08/08/17 296 lb (134.3 kg)                  Labs reviewed in EPIC    ROS:  Constitutional, HEENT, cardiovascular, pulmonary, GI, , musculoskeletal, neuro, skin, endocrine and psych systems are negative, except as otherwise noted.    OBJECTIVE:     /84 (BP Location: Right arm, Patient Position: Chair, Cuff Size: Adult Large)  Pulse 63  Temp 97.4  F (36.3  C) (Oral)  Resp 16  Ht 6' 2.4\" (1.89 m)  Wt 258 lb (117 kg)  SpO2 98%  BMI 32.77 kg/m2  Body mass index is 32.77 kg/(m^2).   Physical Exam   Constitutional: He is oriented to person, place, and time. He appears well-developed.   HENT:   Head: Normocephalic and atraumatic.   Cardiovascular: Normal rate, regular rhythm, normal heart sounds and intact distal pulses.  Exam reveals no gallop.    No murmur heard.  Pulmonary/Chest: Effort normal and breath sounds normal. No respiratory distress. He has no wheezes. He has no rales. He exhibits no tenderness.   Neurological: He is alert and oriented to person, place, and time.   Psychiatric: He has a normal mood and affect.         Diagnostic Test Results:  none     ASSESSMENT/PLAN:     Problem List Items Addressed This Visit     Hypertension     Lost close to 60 pounds since last yr  BP still " borderline  Increase dose of lisinopril to 40mg daily  Recheck in 3 months with a blood pressure log         Relevant Medications    lisinopril (PRINIVIL/ZESTRIL) 40 MG tablet    History of thyroid cancer     S/p hemithyroidectomy  Recheck TSH q yrly         History of pulmonary embolism     H/o unprovoked PE with negative hypercoagulability work up   No new symptoms         Moderate persistent asthma with acute exacerbation     ACt -23  Well controlled symptoms on qvar and albuterol         Relevant Medications    albuterol (VENTOLIN HFA) 108 (90 BASE) MCG/ACT Inhaler      Other Visit Diagnoses     HTN, goal below 140/90    -  Primary    Relevant Medications    lisinopril (PRINIVIL/ZESTRIL) 40 MG tablet    Other Relevant Orders    Lipid panel reflex to direct LDL Fasting    Comprehensive metabolic panel (BMP + Alb, Alk Phos, ALT, AST, Total. Bili, TP)    Wheezing        Relevant Medications    albuterol (VENTOLIN HFA) 108 (90 BASE) MCG/ACT Inhaler    H/O malignant neoplasm of thyroid        Relevant Orders    TSH with free T4 reflex         Monica Odell MD  [facility]

## 2025-01-25 ENCOUNTER — HEALTH MAINTENANCE LETTER (OUTPATIENT)
Age: 60
End: 2025-01-25